# Patient Record
Sex: MALE | Race: WHITE | Employment: OTHER | URBAN - METROPOLITAN AREA
[De-identification: names, ages, dates, MRNs, and addresses within clinical notes are randomized per-mention and may not be internally consistent; named-entity substitution may affect disease eponyms.]

---

## 2019-03-22 RX ORDER — M-VIT,TX,IRON,MINS/CALC/FOLIC 27MG-0.4MG
1 TABLET ORAL DAILY
COMMUNITY

## 2019-03-22 RX ORDER — TRIAMTERENE AND HYDROCHLOROTHIAZIDE 37.5; 25 MG/1; MG/1
1 TABLET ORAL DAILY
Status: ON HOLD | COMMUNITY
End: 2021-06-15

## 2019-03-22 RX ORDER — WARFARIN SODIUM 5 MG/1
5 TABLET ORAL DAILY
COMMUNITY

## 2019-03-26 ENCOUNTER — ANESTHESIA EVENT (OUTPATIENT)
Dept: OPERATING ROOM | Age: 84
End: 2019-03-26
Payer: MEDICARE

## 2019-03-27 ENCOUNTER — ANESTHESIA (OUTPATIENT)
Dept: OPERATING ROOM | Age: 84
End: 2019-03-27
Payer: MEDICARE

## 2019-03-27 ENCOUNTER — HOSPITAL ENCOUNTER (OUTPATIENT)
Age: 84
Setting detail: OUTPATIENT SURGERY
Discharge: HOME | End: 2019-03-27
Attending: OPHTHALMOLOGY | Admitting: OPHTHALMOLOGY
Payer: MEDICARE

## 2019-03-27 VITALS
WEIGHT: 175 LBS | SYSTOLIC BLOOD PRESSURE: 119 MMHG | TEMPERATURE: 98.4 F | BODY MASS INDEX: 28.12 KG/M2 | DIASTOLIC BLOOD PRESSURE: 79 MMHG | HEART RATE: 68 BPM | HEIGHT: 66 IN | RESPIRATION RATE: 14 BRPM | OXYGEN SATURATION: 96 %

## 2019-03-27 VITALS — OXYGEN SATURATION: 100 % | DIASTOLIC BLOOD PRESSURE: 69 MMHG | SYSTOLIC BLOOD PRESSURE: 116 MMHG

## 2019-03-27 PROCEDURE — 6370000000 HC RX 637 (ALT 250 FOR IP): Performed by: OPHTHALMOLOGY

## 2019-03-27 PROCEDURE — 3600000003 HC SURGERY LEVEL 3 BASE: Performed by: OPHTHALMOLOGY

## 2019-03-27 PROCEDURE — 7100000010 HC PHASE II RECOVERY - FIRST 15 MIN: Performed by: OPHTHALMOLOGY

## 2019-03-27 PROCEDURE — 6360000002 HC RX W HCPCS: Performed by: NURSE ANESTHETIST, CERTIFIED REGISTERED

## 2019-03-27 PROCEDURE — 2580000003 HC RX 258: Performed by: ANESTHESIOLOGY

## 2019-03-27 PROCEDURE — 6360000002 HC RX W HCPCS: Performed by: OPHTHALMOLOGY

## 2019-03-27 PROCEDURE — 2709999900 HC NON-CHARGEABLE SUPPLY: Performed by: OPHTHALMOLOGY

## 2019-03-27 PROCEDURE — 3600000013 HC SURGERY LEVEL 3 ADDTL 15MIN: Performed by: OPHTHALMOLOGY

## 2019-03-27 PROCEDURE — 3700000001 HC ADD 15 MINUTES (ANESTHESIA): Performed by: OPHTHALMOLOGY

## 2019-03-27 PROCEDURE — 2500000003 HC RX 250 WO HCPCS: Performed by: OPHTHALMOLOGY

## 2019-03-27 PROCEDURE — V2632 POST CHMBR INTRAOCULAR LENS: HCPCS | Performed by: OPHTHALMOLOGY

## 2019-03-27 PROCEDURE — 3700000000 HC ANESTHESIA ATTENDED CARE: Performed by: OPHTHALMOLOGY

## 2019-03-27 PROCEDURE — 7100000011 HC PHASE II RECOVERY - ADDTL 15 MIN: Performed by: OPHTHALMOLOGY

## 2019-03-27 DEVICE — ACRYSOF(R) IQ ASPHERIC IOL SP ACRYLIC FOLDABLELENS WULTRASERT(TM) DELIVERY SYSTEM UV WBLUE LIGHT FILTER. 13.0MM LENGTH 6.0MM ANTERIORASYMMETRIC BICONVEX OPTIC PLANAR HAPTICS.
Type: IMPLANTABLE DEVICE | Site: EYE | Status: FUNCTIONAL
Brand: ACRYSOF ULTRASERT

## 2019-03-27 RX ORDER — DIPHENHYDRAMINE HYDROCHLORIDE 50 MG/ML
12.5 INJECTION INTRAMUSCULAR; INTRAVENOUS
Status: DISCONTINUED | OUTPATIENT
Start: 2019-03-27 | End: 2019-03-27 | Stop reason: HOSPADM

## 2019-03-27 RX ORDER — SODIUM CHLORIDE, POTASSIUM CHLORIDE, CALCIUM CHLORIDE, MAGNESIUM CHLORIDE, SODIUM ACETATE, AND SODIUM CITRATE 6.4; .75; .48; .3; 3.9; 1.7 MG/ML; MG/ML; MG/ML; MG/ML; MG/ML; MG/ML
SOLUTION IRRIGATION PRN
Status: DISCONTINUED | OUTPATIENT
Start: 2019-03-27 | End: 2019-03-27 | Stop reason: ALTCHOICE

## 2019-03-27 RX ORDER — LABETALOL HYDROCHLORIDE 5 MG/ML
5 INJECTION, SOLUTION INTRAVENOUS EVERY 10 MIN PRN
Status: DISCONTINUED | OUTPATIENT
Start: 2019-03-27 | End: 2019-03-27 | Stop reason: HOSPADM

## 2019-03-27 RX ORDER — MOXIFLOXACIN 5 MG/ML
1 SOLUTION/ DROPS OPHTHALMIC CONTINUOUS
Status: DISCONTINUED | OUTPATIENT
Start: 2019-03-27 | End: 2019-03-27 | Stop reason: HOSPADM

## 2019-03-27 RX ORDER — MORPHINE SULFATE 10 MG/ML
2 INJECTION, SOLUTION INTRAMUSCULAR; INTRAVENOUS EVERY 5 MIN PRN
Status: DISCONTINUED | OUTPATIENT
Start: 2019-03-27 | End: 2019-03-27 | Stop reason: HOSPADM

## 2019-03-27 RX ORDER — MORPHINE SULFATE 10 MG/ML
1 INJECTION, SOLUTION INTRAMUSCULAR; INTRAVENOUS EVERY 5 MIN PRN
Status: DISCONTINUED | OUTPATIENT
Start: 2019-03-27 | End: 2019-03-27 | Stop reason: HOSPADM

## 2019-03-27 RX ORDER — MEPERIDINE HYDROCHLORIDE 25 MG/ML
12.5 INJECTION INTRAMUSCULAR; INTRAVENOUS; SUBCUTANEOUS EVERY 5 MIN PRN
Status: DISCONTINUED | OUTPATIENT
Start: 2019-03-27 | End: 2019-03-27 | Stop reason: HOSPADM

## 2019-03-27 RX ORDER — MIDAZOLAM HYDROCHLORIDE 1 MG/ML
INJECTION INTRAMUSCULAR; INTRAVENOUS PRN
Status: DISCONTINUED | OUTPATIENT
Start: 2019-03-27 | End: 2019-03-27 | Stop reason: SDUPTHER

## 2019-03-27 RX ORDER — HYDRALAZINE HYDROCHLORIDE 20 MG/ML
5 INJECTION INTRAMUSCULAR; INTRAVENOUS EVERY 10 MIN PRN
Status: DISCONTINUED | OUTPATIENT
Start: 2019-03-27 | End: 2019-03-27 | Stop reason: HOSPADM

## 2019-03-27 RX ORDER — ACETAMINOPHEN 650 MG
TABLET, EXTENDED RELEASE ORAL PRN
Status: DISCONTINUED | OUTPATIENT
Start: 2019-03-27 | End: 2019-03-27 | Stop reason: ALTCHOICE

## 2019-03-27 RX ORDER — TETRACAINE HYDROCHLORIDE 5 MG/ML
SOLUTION OPHTHALMIC PRN
Status: DISCONTINUED | OUTPATIENT
Start: 2019-03-27 | End: 2019-03-27 | Stop reason: ALTCHOICE

## 2019-03-27 RX ORDER — PREDNISOLONE ACETATE 10 MG/ML
1 SUSPENSION/ DROPS OPHTHALMIC CONTINUOUS
Status: DISCONTINUED | OUTPATIENT
Start: 2019-03-27 | End: 2019-03-27 | Stop reason: HOSPADM

## 2019-03-27 RX ORDER — ONDANSETRON 2 MG/ML
4 INJECTION INTRAMUSCULAR; INTRAVENOUS PRN
Status: DISCONTINUED | OUTPATIENT
Start: 2019-03-27 | End: 2019-03-27 | Stop reason: HOSPADM

## 2019-03-27 RX ORDER — FENTANYL CITRATE 50 UG/ML
INJECTION, SOLUTION INTRAMUSCULAR; INTRAVENOUS PRN
Status: DISCONTINUED | OUTPATIENT
Start: 2019-03-27 | End: 2019-03-27 | Stop reason: SDUPTHER

## 2019-03-27 RX ORDER — HYDROMORPHONE HCL 110MG/55ML
0.25 PATIENT CONTROLLED ANALGESIA SYRINGE INTRAVENOUS EVERY 5 MIN PRN
Status: DISCONTINUED | OUTPATIENT
Start: 2019-03-27 | End: 2019-03-27 | Stop reason: HOSPADM

## 2019-03-27 RX ORDER — MOXIFLOXACIN 5 MG/ML
SOLUTION/ DROPS OPHTHALMIC PRN
Status: DISCONTINUED | OUTPATIENT
Start: 2019-03-27 | End: 2019-03-27 | Stop reason: ALTCHOICE

## 2019-03-27 RX ORDER — SODIUM CHLORIDE, SODIUM LACTATE, POTASSIUM CHLORIDE, CALCIUM CHLORIDE 600; 310; 30; 20 MG/100ML; MG/100ML; MG/100ML; MG/100ML
INJECTION, SOLUTION INTRAVENOUS CONTINUOUS
Status: DISCONTINUED | OUTPATIENT
Start: 2019-03-27 | End: 2019-03-27 | Stop reason: HOSPADM

## 2019-03-27 RX ORDER — PROMETHAZINE HYDROCHLORIDE 25 MG/ML
6.25 INJECTION, SOLUTION INTRAMUSCULAR; INTRAVENOUS
Status: DISCONTINUED | OUTPATIENT
Start: 2019-03-27 | End: 2019-03-27 | Stop reason: HOSPADM

## 2019-03-27 RX ORDER — OXYCODONE HYDROCHLORIDE AND ACETAMINOPHEN 5; 325 MG/1; MG/1
1 TABLET ORAL PRN
Status: DISCONTINUED | OUTPATIENT
Start: 2019-03-27 | End: 2019-03-27 | Stop reason: HOSPADM

## 2019-03-27 RX ORDER — HYDROMORPHONE HCL 110MG/55ML
0.5 PATIENT CONTROLLED ANALGESIA SYRINGE INTRAVENOUS EVERY 5 MIN PRN
Status: DISCONTINUED | OUTPATIENT
Start: 2019-03-27 | End: 2019-03-27 | Stop reason: HOSPADM

## 2019-03-27 RX ORDER — OXYCODONE HYDROCHLORIDE AND ACETAMINOPHEN 5; 325 MG/1; MG/1
2 TABLET ORAL PRN
Status: DISCONTINUED | OUTPATIENT
Start: 2019-03-27 | End: 2019-03-27 | Stop reason: HOSPADM

## 2019-03-27 RX ADMIN — Medication 0.3 ML: at 09:31

## 2019-03-27 RX ADMIN — Medication 0.3 ML: at 09:25

## 2019-03-27 RX ADMIN — FENTANYL CITRATE 50 MCG: 50 INJECTION INTRAMUSCULAR; INTRAVENOUS at 09:46

## 2019-03-27 RX ADMIN — MIDAZOLAM 1 MG: 1 INJECTION INTRAMUSCULAR; INTRAVENOUS at 09:46

## 2019-03-27 RX ADMIN — SODIUM CHLORIDE, POTASSIUM CHLORIDE, SODIUM LACTATE AND CALCIUM CHLORIDE: 600; 310; 30; 20 INJECTION, SOLUTION INTRAVENOUS at 09:25

## 2019-03-27 RX ADMIN — Medication 0.3 ML: at 09:40

## 2019-03-27 ASSESSMENT — PULMONARY FUNCTION TESTS
PIF_VALUE: 0

## 2019-03-27 ASSESSMENT — PAIN - FUNCTIONAL ASSESSMENT: PAIN_FUNCTIONAL_ASSESSMENT: 0-10

## 2019-03-27 ASSESSMENT — PAIN DESCRIPTION - DESCRIPTORS: DESCRIPTORS: ACHING

## 2019-03-27 ASSESSMENT — PAIN SCALES - GENERAL: PAINLEVEL_OUTOF10: 0

## 2019-03-30 NOTE — OP NOTE
[facility]    OPERATIVE NOTE    Preoperative Diagnosis: Cataract right eye    Postoperative Diagnosis: Cataract right eye    Procedure: Phacoemulsification with intraocular lens inplantation, right eye    Surgeon: Abdoul Guillory M.D., Ph.D. Anesthesia: MAC with topical    Complications: none    Specimens: none    Indications for procedure: The patient is a 80y.o. year old with decreased vision, glare and halos around lights, and trouble with activities of daily living. Examination revealed a visually significant cataract in the right eye. Risks, benefits, and alternatives to surgery were discussed with the patient and the patient elected to proceed with phacoemulsification with lens implantation. Details of the procedure: Following informed consent, the patient was taken to the operating room and placed in the supine position. The eye was prepped and draped in the usual sterile fashion using aseptic technique for cataract surgery. Following topical tetracaine drops a side port incision was made in the superotemporal cornea. The eye was filled with Trypan blue followed by balanced salt solution. The eye was filled with 1% non-preserved lidocaine. The eye was filled with viscoelastic and a 2.2 mm keratome blade was used to make a 3-plane clear corneal incision in the temporal cornea. The cystitome was used to make a tear in the anterior capsule and a Utrata forceps was used to make a complete curvilinear capsulorrhexis. The lens was hydrodissected and freely rotated. Phacoemulsification was performed. Irrigation/aspiration was used to remove all cortical material from the capsular bag. The eye was filled with viscoelastic and a foldable posterior chamber intraocular lens was injected into the capsular bag. Irrigation/aspiration was used to remove all excess viscoelastic. The eye was pressurized and the wounds were check for leaks and none were found.   The patient had Betadine and Alphagan solutions placed on the eye. The eye was covered with a metal shield. The patient went to the PACU in excellent condition, having tolerated the procedure extremely well, and will follow up with me tomorrow for postop day 1 care.     Marjory Nissen, MD, PhD, FACS

## 2019-03-30 NOTE — H&P
The H&P was reviewed, the patient was examined, and no change has occurred in the patient's condition since the H&P was completed.     Tameka Barrientos MD, PhD, FACS

## 2019-04-01 NOTE — PROGRESS NOTES
PRE OP INSTRUCTION SHEET   1. Do not eat or drink anything after 12 midnight  prior to surgery. This includes no water, chewing gum or mints. 2. Take the following pills will a small sip of water (see MAR)                                        3. Aspirin, Ibuprofen, Advil, Naproxen, Vitamin E, fish oil and other Anti-inflammatory products should be stopped for 5 days before surgery or as directed by your physician. 4. Check with your Doctor regarding stopping Plavix, Coumadin, Lovenox, Fragmin or other blood thinners   5. Do not smoke, and do not drink any alcoholic beverages 24 hours prior to surgery. This includes NA Beer. 6. You may brush your teeth and gargle the morning of surgery. DO NOT SWALLOW WATER   7. You MUST make arrangements for a responsible adult to take you home after your surgery. You will not be allowed to leave alone or drive yourself home. It is strongly suggested someone stay with you the first 24 hrs. Your surgery will be cancelled if you do not have a ride home. 8. A parent/legal guardian must accompany a child scheduled for surgery and plan to stay at the hospital until the child is discharged. Please do not bring other children with you. 9. Please wear simple, loose fitting clothing to the hospital.  Verdia Blanks not bring valuables (money, credit cards, checkbooks, etc.) Do not wear any makeup (including no eye makeup) or nail polish on your fingers or toes. 10. DO NOT wear any jewelry or piercings on day of surgery. All body piercing jewelry must be removed. 11. If you have dentures,glasses, or contacts they will be removed before going to the OR; we will provide you a container. 12. Please see your family doctor/and cardiologist for a history & physical and/or concerning medications. Bring any test results/reports from your physician's office. Have history and labs faxed to [phone].  Remember to bring Blood Bank bracelet on the day of surgery. 14. If you have a Living Will and Durable Power of  for Healthcare, please bring in a copy. 13. Notify your Surgeon if you develop any illness between now and surgery  time, cough, cold, fever, sore throat, nausea, vomiting, etc.  Please notify your surgeon if you experience dizziness, shortness of breath or blurred vision between now & the time of your surgery   16. DO NOT shave your operative site 96 hours prior to surgery. For face & neck surgery, men may use an electric razor 48 hours prior to surgery. 17. Shower with _x__Antibacterial soap (x_chlorhexidine for total joint  Pt's) shower two times before surgery.(the morning of and the night before. 18. To provide excellent care visitors will be limited to one in the room at any given time.   Please call pre admission testing if you any further questions [phone] or [phone]

## 2019-04-02 ENCOUNTER — ANESTHESIA EVENT (OUTPATIENT)
Dept: OPERATING ROOM | Age: 84
End: 2019-04-02
Payer: MEDICARE

## 2019-04-03 ENCOUNTER — ANESTHESIA (OUTPATIENT)
Dept: OPERATING ROOM | Age: 84
End: 2019-04-03
Payer: MEDICARE

## 2019-04-03 ENCOUNTER — HOSPITAL ENCOUNTER (OUTPATIENT)
Age: 84
Setting detail: OUTPATIENT SURGERY
Discharge: HOME | End: 2019-04-03
Attending: OPHTHALMOLOGY | Admitting: OPHTHALMOLOGY
Payer: MEDICARE

## 2019-04-03 VITALS
TEMPERATURE: 98.1 F | HEIGHT: 66 IN | RESPIRATION RATE: 14 BRPM | OXYGEN SATURATION: 99 % | SYSTOLIC BLOOD PRESSURE: 104 MMHG | HEART RATE: 70 BPM | WEIGHT: 175 LBS | DIASTOLIC BLOOD PRESSURE: 65 MMHG | BODY MASS INDEX: 28.12 KG/M2

## 2019-04-03 VITALS — OXYGEN SATURATION: 100 % | SYSTOLIC BLOOD PRESSURE: 104 MMHG | DIASTOLIC BLOOD PRESSURE: 60 MMHG

## 2019-04-03 PROCEDURE — 6360000002 HC RX W HCPCS: Performed by: OPHTHALMOLOGY

## 2019-04-03 PROCEDURE — 7100000010 HC PHASE II RECOVERY - FIRST 15 MIN: Performed by: OPHTHALMOLOGY

## 2019-04-03 PROCEDURE — 3700000001 HC ADD 15 MINUTES (ANESTHESIA): Performed by: OPHTHALMOLOGY

## 2019-04-03 PROCEDURE — 2500000003 HC RX 250 WO HCPCS: Performed by: OPHTHALMOLOGY

## 2019-04-03 PROCEDURE — 6360000002 HC RX W HCPCS: Performed by: NURSE ANESTHETIST, CERTIFIED REGISTERED

## 2019-04-03 PROCEDURE — 3600000003 HC SURGERY LEVEL 3 BASE: Performed by: OPHTHALMOLOGY

## 2019-04-03 PROCEDURE — 6370000000 HC RX 637 (ALT 250 FOR IP): Performed by: OPHTHALMOLOGY

## 2019-04-03 PROCEDURE — V2632 POST CHMBR INTRAOCULAR LENS: HCPCS | Performed by: OPHTHALMOLOGY

## 2019-04-03 PROCEDURE — 2580000003 HC RX 258: Performed by: ANESTHESIOLOGY

## 2019-04-03 PROCEDURE — 3700000000 HC ANESTHESIA ATTENDED CARE: Performed by: OPHTHALMOLOGY

## 2019-04-03 PROCEDURE — 3600000013 HC SURGERY LEVEL 3 ADDTL 15MIN: Performed by: OPHTHALMOLOGY

## 2019-04-03 PROCEDURE — 2709999900 HC NON-CHARGEABLE SUPPLY: Performed by: OPHTHALMOLOGY

## 2019-04-03 PROCEDURE — 2500000003 HC RX 250 WO HCPCS: Performed by: ANESTHESIOLOGY

## 2019-04-03 PROCEDURE — 7100000011 HC PHASE II RECOVERY - ADDTL 15 MIN: Performed by: OPHTHALMOLOGY

## 2019-04-03 DEVICE — ACRYSOF(R) IQ ASPHERIC IOL SP ACRYLIC FOLDABLELENS WULTRASERT(TM) DELIVERY SYSTEM UV WBLUE LIGHT FILTER. 13.0MM LENGTH 6.0MM ANTERIORASYMMETRIC BICONVEX OPTIC PLANAR HAPTICS.
Type: IMPLANTABLE DEVICE | Site: EYE | Status: FUNCTIONAL
Brand: ACRYSOF ULTRASERT

## 2019-04-03 RX ORDER — LIDOCAINE HYDROCHLORIDE 10 MG/ML
1 INJECTION, SOLUTION EPIDURAL; INFILTRATION; INTRACAUDAL; PERINEURAL
Status: COMPLETED | OUTPATIENT
Start: 2019-04-03 | End: 2019-04-03

## 2019-04-03 RX ORDER — SODIUM CHLORIDE 0.9 % (FLUSH) 0.9 %
10 SYRINGE (ML) INJECTION PRN
Status: DISCONTINUED | OUTPATIENT
Start: 2019-04-03 | End: 2019-04-03 | Stop reason: HOSPADM

## 2019-04-03 RX ORDER — SODIUM CHLORIDE, SODIUM LACTATE, POTASSIUM CHLORIDE, CALCIUM CHLORIDE 600; 310; 30; 20 MG/100ML; MG/100ML; MG/100ML; MG/100ML
INJECTION, SOLUTION INTRAVENOUS CONTINUOUS
Status: DISCONTINUED | OUTPATIENT
Start: 2019-04-03 | End: 2019-04-03 | Stop reason: HOSPADM

## 2019-04-03 RX ORDER — MOXIFLOXACIN 5 MG/ML
SOLUTION/ DROPS OPHTHALMIC PRN
Status: DISCONTINUED | OUTPATIENT
Start: 2019-04-03 | End: 2019-04-03 | Stop reason: ALTCHOICE

## 2019-04-03 RX ORDER — MOXIFLOXACIN 5 MG/ML
1 SOLUTION/ DROPS OPHTHALMIC SEE ADMIN INSTRUCTIONS
Status: DISCONTINUED | OUTPATIENT
Start: 2019-04-03 | End: 2019-04-03 | Stop reason: HOSPADM

## 2019-04-03 RX ORDER — ACETAMINOPHEN 650 MG
TABLET, EXTENDED RELEASE ORAL PRN
Status: DISCONTINUED | OUTPATIENT
Start: 2019-04-03 | End: 2019-04-03 | Stop reason: ALTCHOICE

## 2019-04-03 RX ORDER — FENTANYL CITRATE 50 UG/ML
INJECTION, SOLUTION INTRAMUSCULAR; INTRAVENOUS PRN
Status: DISCONTINUED | OUTPATIENT
Start: 2019-04-03 | End: 2019-04-03 | Stop reason: SDUPTHER

## 2019-04-03 RX ORDER — TETRACAINE HYDROCHLORIDE 5 MG/ML
SOLUTION OPHTHALMIC PRN
Status: DISCONTINUED | OUTPATIENT
Start: 2019-04-03 | End: 2019-04-03 | Stop reason: ALTCHOICE

## 2019-04-03 RX ORDER — SODIUM CHLORIDE, POTASSIUM CHLORIDE, CALCIUM CHLORIDE, MAGNESIUM CHLORIDE, SODIUM ACETATE, AND SODIUM CITRATE 6.4; .75; .48; .3; 3.9; 1.7 MG/ML; MG/ML; MG/ML; MG/ML; MG/ML; MG/ML
SOLUTION IRRIGATION PRN
Status: DISCONTINUED | OUTPATIENT
Start: 2019-04-03 | End: 2019-04-03 | Stop reason: ALTCHOICE

## 2019-04-03 RX ORDER — MIDAZOLAM HYDROCHLORIDE 1 MG/ML
INJECTION INTRAMUSCULAR; INTRAVENOUS PRN
Status: DISCONTINUED | OUTPATIENT
Start: 2019-04-03 | End: 2019-04-03 | Stop reason: SDUPTHER

## 2019-04-03 RX ORDER — PREDNISOLONE ACETATE 10 MG/ML
1 SUSPENSION/ DROPS OPHTHALMIC SEE ADMIN INSTRUCTIONS
Status: DISCONTINUED | OUTPATIENT
Start: 2019-04-03 | End: 2019-04-03 | Stop reason: HOSPADM

## 2019-04-03 RX ORDER — SODIUM CHLORIDE 0.9 % (FLUSH) 0.9 %
10 SYRINGE (ML) INJECTION EVERY 12 HOURS SCHEDULED
Status: DISCONTINUED | OUTPATIENT
Start: 2019-04-03 | End: 2019-04-03 | Stop reason: HOSPADM

## 2019-04-03 RX ADMIN — Medication 0.3 ML: at 07:27

## 2019-04-03 RX ADMIN — SODIUM CHLORIDE, POTASSIUM CHLORIDE, SODIUM LACTATE AND CALCIUM CHLORIDE: 600; 310; 30; 20 INJECTION, SOLUTION INTRAVENOUS at 07:12

## 2019-04-03 RX ADMIN — LIDOCAINE HYDROCHLORIDE 1 ML: 10 INJECTION, SOLUTION EPIDURAL; INFILTRATION; INTRACAUDAL; PERINEURAL at 07:22

## 2019-04-03 RX ADMIN — Medication 0.3 ML: at 07:20

## 2019-04-03 RX ADMIN — MIDAZOLAM 2 MG: 1 INJECTION INTRAMUSCULAR; INTRAVENOUS at 08:18

## 2019-04-03 RX ADMIN — SODIUM CHLORIDE, POTASSIUM CHLORIDE, SODIUM LACTATE AND CALCIUM CHLORIDE: 600; 310; 30; 20 INJECTION, SOLUTION INTRAVENOUS at 08:15

## 2019-04-03 RX ADMIN — Medication 0.3 ML: at 07:10

## 2019-04-03 RX ADMIN — FENTANYL CITRATE 25 MCG: 50 INJECTION INTRAMUSCULAR; INTRAVENOUS at 08:18

## 2019-04-03 ASSESSMENT — PULMONARY FUNCTION TESTS
PIF_VALUE: 0
PIF_VALUE: 1
PIF_VALUE: 0

## 2019-04-03 ASSESSMENT — PAIN DESCRIPTION - DESCRIPTORS: DESCRIPTORS: ACHING

## 2019-04-03 ASSESSMENT — PAIN SCALES - GENERAL: PAINLEVEL_OUTOF10: 0

## 2019-04-03 ASSESSMENT — PAIN - FUNCTIONAL ASSESSMENT: PAIN_FUNCTIONAL_ASSESSMENT: 0-10

## 2019-04-03 NOTE — ANESTHESIA PRE PROCEDURE
Department of Anesthesiology  Preprocedure Note       Name:  Willian Aguirre   Age:  80 y.o.  :  1931                                          MRN:  [id number]         Date:  4/3/2019      Surgeon: Soni Vann):  Orville Huynh MD    Procedure: DR JANNETH STEWART [facility] EMULSIFICATION OF CATARACT WITH INTRAOCULAR LENS IMPLANT EYE (Right Eye)    Medications prior to admission:   Prior to Admission medications    Medication Sig Start Date End Date Taking? Authorizing Provider   warfarin (COUMADIN) 5 MG tablet Take 5 mg by mouth daily   Yes Historical Provider, MD   triamterene-hydrochlorothiazide (MAXZIDE-25) 37.5-25 MG per tablet Take 1 tablet by mouth daily   Yes Historical Provider, MD   Multiple Vitamins-Minerals (THERAPEUTIC MULTIVITAMIN-MINERALS) tablet Take 1 tablet by mouth daily   Yes Historical Provider, MD   lisinopril (PRINIVIL;ZESTRIL) 40 MG tablet  14  Yes Historical Provider, MD       Current medications:    Current Facility-Administered Medications   Medication Dose Route Frequency Provider Last Rate Last Dose    moxifloxacin (VIGAMOX) 0.5 % ophthalmic solution 1 drop  1 drop Ophthalmic See Admin Instructions Orville Huynh MD        prednisoLONE acetate (PRED FORTE) 1 % ophthalmic suspension 1 drop  1 drop Ophthalmic See Admin Instructions Orville Huynh MD        epinephrine and lidocaine 2% PF in BSS injection (1 mL)   Intraocular Once Orville Huynh MD        lactated ringers infusion   Intravenous Continuous Fred Mcintosh  mL/hr at 19 4943      sodium chloride flush 0.9 % injection 10 mL  10 mL Intravenous 2 times per day Fred Mcintosh MD        sodium chloride flush 0.9 % injection 10 mL  10 mL Intravenous PRN Fred Mcintosh MD           Allergies:  No Known Allergies    Problem List:  There is no problem list on file for this patient.       Past Medical History:        Diagnosis Date    Aortic valve stenosis     Arthritis     shoulder    Hypertension     Phlebitis for: [facility], [address]    Anesthesia Evaluation    Airway: Mallampati: III  TM distance: >3 FB   Neck ROM: full  Mouth opening: > = 3 FB Dental:          Pulmonary:   (+) pneumonia:                             Cardiovascular:    (+) hypertension:,                   Neuro/Psych:               GI/Hepatic/Renal:             Endo/Other:                     Abdominal:           Vascular:                                        Anesthesia Plan      MAC     ASA 3       Induction: intravenous. Anesthetic plan and risks discussed with patient.                       Colten Ann MD   4/3/2019

## 2019-04-03 NOTE — ANESTHESIA POSTPROCEDURE EVALUATION
Department of Anesthesiology  Postprocedure Note    Patient: Bibiana Skelton  MRN: [id number]  YOB: 1931  Date of evaluation: 4/3/2019  Time:  12:22 PM     Procedure Summary     Date:  04/03/19 Room / Location:  [facility] 01 / [facility]    Anesthesia Start:  0815 Anesthesia Stop:  1741    Procedure:  PHACO EMULSIFICATION OF CATARACT WITH INTRAOCULAR LENS IMPLANT EYE (Right Eye) Diagnosis:  (CATARACT RIGHT EYE)    Surgeon:  Michael Baez MD Responsible Provider:  Luba Cain MD    Anesthesia Type:  MAC ASA Status:  3          Anesthesia Type: MAC    Elvin Phase I: Elvin Score: 10    Elvin Phase II: Elvin Score: 10    Last vitals: Reviewed and per EMR flowsheets. Anesthesia Post Evaluation    Patient location during evaluation: PACU  Level of consciousness: awake and alert  Airway patency: patent  Nausea & Vomiting: no nausea and no vomiting  Complications: no  Cardiovascular status: blood pressure returned to baseline  Respiratory status: acceptable  Hydration status: euvolemic  Comments: Postoperative Anesthesia Note    Name:    Bibiana Skelton  MRN:      [id number]    Patient Vitals in the past 12 hrs:  04/03/19 0847, BP:104/65, Pulse:70, Resp:14, SpO2:99 %  04/03/19 0832, BP:(!) 104/56, Temp:98.1 °F (36.7 °C), Temp src:Temporal, Pulse:74, Resp:12, SpO2:100 %  04/03/19 0719, BP:123/76, Temp:98.4 °F (36.9 °C), Temp src:Temporal, Pulse:67, Resp:14, SpO2:99 %, Height:5' 6\" (1.676 m), Weight:175 lb (79.4 kg)     LABS:    CBC  No results found for: WBC, HGB, HCT, PLT  RENAL  No results found for: NA, K, CL, CO2, BUN, CREATININE, GLUCOSE  COAGS  No results found for: PROTIME, INR, APTT    Intake & Output:  @90GWND@    Nausea & Vomiting:  No    Level of Consciousness:  Awake    Pain Assessment:  Adequate analgesia    Anesthesia Complications:  No apparent anesthetic complications    SUMMARY      Vital signs stable  OK to discharge from Stage I post anesthesia care.   Care transferred from Anesthesiology department on discharge from perioperative area

## 2019-04-03 NOTE — PROGRESS NOTES
NO CHANGE IN PHYSICAL ASSESSMENT; PT AND FAMILY VERBALIZE UNDERSTANDING OF INSTRUCTIONS; PT DISCHARGED VIA W/C BY TRANSPORT WITH FAMILY IN STABLE CONDITION; NO C/O

## 2019-04-03 NOTE — PROGRESS NOTES
Report from [facility] and CRNA. Pt arrived to postop from OR. See doc flow for vitals and assessment. Will monitor.

## 2020-11-27 ENCOUNTER — HOSPITAL ENCOUNTER (OUTPATIENT)
Age: 85
Setting detail: SPECIMEN
Discharge: HOME | End: 2020-11-27
Payer: MEDICARE

## 2020-11-27 LAB
INR BLD: 5.75 (ref 0.86–1.14)
PROTHROMBIN TIME: 63.4 SEC (ref 10–13.2)

## 2020-11-27 PROCEDURE — 85610 PROTHROMBIN TIME: CPT

## 2020-11-27 PROCEDURE — 36415 COLL VENOUS BLD VENIPUNCTURE: CPT

## 2020-11-30 LAB
INR BLD: 3.44 (ref 0.86–1.14)
PROTHROMBIN TIME: 40.4 SEC (ref 10–13.2)

## 2020-12-08 LAB
INR BLD: 2.96 (ref 0.86–1.14)
PROTHROMBIN TIME: 34.7 SEC (ref 10–13.2)

## 2021-06-15 ENCOUNTER — APPOINTMENT (OUTPATIENT)
Dept: CT IMAGING | Age: 86
DRG: 554 | End: 2021-06-15
Payer: MEDICARE

## 2021-06-15 ENCOUNTER — HOSPITAL ENCOUNTER (INPATIENT)
Age: 86
LOS: 3 days | Discharge: NURSING, REHAB OR LONG-TERM CARE | DRG: 554 | End: 2021-06-18
Attending: EMERGENCY MEDICINE | Admitting: INTERNAL MEDICINE
Payer: MEDICARE

## 2021-06-15 ENCOUNTER — APPOINTMENT (OUTPATIENT)
Dept: GENERAL RADIOLOGY | Age: 86
DRG: 554 | End: 2021-06-15
Payer: MEDICARE

## 2021-06-15 DIAGNOSIS — M25.422 ELBOW EFFUSION, LEFT: Primary | ICD-10-CM

## 2021-06-15 DIAGNOSIS — R26.2 UNABLE TO AMBULATE: ICD-10-CM

## 2021-06-15 LAB
ANION GAP SERPL CALCULATED.3IONS-SCNC: 9 MMOL/L (ref 3–16)
BASOPHILS ABSOLUTE: 0 K/UL (ref 0–0.2)
BASOPHILS RELATIVE PERCENT: 0.4 %
BUN BLDV-MCNC: 35 MG/DL (ref 7–20)
CALCIUM SERPL-MCNC: 8.8 MG/DL (ref 8.3–10.6)
CHLORIDE BLD-SCNC: 101 MMOL/L (ref 99–110)
CO2: 26 MMOL/L (ref 21–32)
CREAT SERPL-MCNC: 1 MG/DL (ref 0.8–1.3)
EOSINOPHILS ABSOLUTE: 0 K/UL (ref 0–0.6)
EOSINOPHILS RELATIVE PERCENT: 0 %
GFR AFRICAN AMERICAN: >60
GFR NON-AFRICAN AMERICAN: >60
GLUCOSE BLD-MCNC: 103 MG/DL (ref 70–99)
HCT VFR BLD CALC: 27.8 % (ref 40.5–52.5)
HEMOGLOBIN: 9.5 G/DL (ref 13.5–17.5)
INR BLD: 3.9 (ref 0.86–1.14)
LYMPHOCYTES ABSOLUTE: 1.2 K/UL (ref 1–5.1)
LYMPHOCYTES RELATIVE PERCENT: 13.5 %
MCH RBC QN AUTO: 32.4 PG (ref 26–34)
MCHC RBC AUTO-ENTMCNC: 34.1 G/DL (ref 31–36)
MCV RBC AUTO: 95 FL (ref 80–100)
MONOCYTES ABSOLUTE: 1.1 K/UL (ref 0–1.3)
MONOCYTES RELATIVE PERCENT: 12.4 %
NEUTROPHILS ABSOLUTE: 6.7 K/UL (ref 1.7–7.7)
NEUTROPHILS RELATIVE PERCENT: 73.7 %
PDW BLD-RTO: 17 % (ref 12.4–15.4)
PLATELET # BLD: 56 K/UL (ref 135–450)
PLATELET SLIDE REVIEW: ABNORMAL
PMV BLD AUTO: 12.5 FL (ref 5–10.5)
POTASSIUM REFLEX MAGNESIUM: 4.2 MMOL/L (ref 3.5–5.1)
PROTHROMBIN TIME: 45.9 SEC (ref 10–13.2)
RBC # BLD: 2.92 M/UL (ref 4.2–5.9)
SARS-COV-2, NAAT: NOT DETECTED
SEDIMENTATION RATE, ERYTHROCYTE: 21 MM/HR (ref 0–20)
SLIDE REVIEW: ABNORMAL
SODIUM BLD-SCNC: 136 MMOL/L (ref 136–145)
URIC ACID, SERUM: 4.3 MG/DL (ref 3.5–7.2)
WBC # BLD: 9.1 K/UL (ref 4–11)

## 2021-06-15 PROCEDURE — 99285 EMERGENCY DEPT VISIT HI MDM: CPT

## 2021-06-15 PROCEDURE — G0378 HOSPITAL OBSERVATION PER HR: HCPCS

## 2021-06-15 PROCEDURE — 6370000000 HC RX 637 (ALT 250 FOR IP): Performed by: EMERGENCY MEDICINE

## 2021-06-15 PROCEDURE — 85652 RBC SED RATE AUTOMATED: CPT

## 2021-06-15 PROCEDURE — 1200000000 HC SEMI PRIVATE

## 2021-06-15 PROCEDURE — 99222 1ST HOSP IP/OBS MODERATE 55: CPT | Performed by: PHYSICIAN ASSISTANT

## 2021-06-15 PROCEDURE — 85610 PROTHROMBIN TIME: CPT

## 2021-06-15 PROCEDURE — 2580000003 HC RX 258: Performed by: PHYSICIAN ASSISTANT

## 2021-06-15 PROCEDURE — 73070 X-RAY EXAM OF ELBOW: CPT

## 2021-06-15 PROCEDURE — 80048 BASIC METABOLIC PNL TOTAL CA: CPT

## 2021-06-15 PROCEDURE — 2580000003 HC RX 258: Performed by: NURSE PRACTITIONER

## 2021-06-15 PROCEDURE — 6360000002 HC RX W HCPCS: Performed by: PHYSICIAN ASSISTANT

## 2021-06-15 PROCEDURE — 84550 ASSAY OF BLOOD/URIC ACID: CPT

## 2021-06-15 PROCEDURE — 73200 CT UPPER EXTREMITY W/O DYE: CPT

## 2021-06-15 PROCEDURE — 85025 COMPLETE CBC W/AUTO DIFF WBC: CPT

## 2021-06-15 PROCEDURE — 6370000000 HC RX 637 (ALT 250 FOR IP): Performed by: PHYSICIAN ASSISTANT

## 2021-06-15 PROCEDURE — 87635 SARS-COV-2 COVID-19 AMP PRB: CPT

## 2021-06-15 RX ORDER — ATORVASTATIN CALCIUM 10 MG/1
20 TABLET, FILM COATED ORAL DAILY
COMMUNITY

## 2021-06-15 RX ORDER — TRIAMTERENE AND HYDROCHLOROTHIAZIDE 37.5; 25 MG/1; MG/1
1 TABLET ORAL DAILY
Status: DISCONTINUED | OUTPATIENT
Start: 2021-06-15 | End: 2021-06-18 | Stop reason: HOSPADM

## 2021-06-15 RX ORDER — ONDANSETRON 4 MG/1
4 TABLET, ORALLY DISINTEGRATING ORAL EVERY 8 HOURS PRN
Status: DISCONTINUED | OUTPATIENT
Start: 2021-06-15 | End: 2021-06-18 | Stop reason: HOSPADM

## 2021-06-15 RX ORDER — HYDROCODONE BITARTRATE AND ACETAMINOPHEN 5; 325 MG/1; MG/1
1 TABLET ORAL ONCE
Status: COMPLETED | OUTPATIENT
Start: 2021-06-15 | End: 2021-06-15

## 2021-06-15 RX ORDER — ATORVASTATIN CALCIUM 40 MG/1
40 TABLET, FILM COATED ORAL DAILY
Status: DISCONTINUED | OUTPATIENT
Start: 2021-06-16 | End: 2021-06-18 | Stop reason: HOSPADM

## 2021-06-15 RX ORDER — SODIUM CHLORIDE 0.9 % (FLUSH) 0.9 %
5-40 SYRINGE (ML) INJECTION PRN
Status: DISCONTINUED | OUTPATIENT
Start: 2021-06-15 | End: 2021-06-18 | Stop reason: HOSPADM

## 2021-06-15 RX ORDER — PHYTONADIONE 10 MG/ML
10 INJECTION, EMULSION INTRAMUSCULAR; INTRAVENOUS; SUBCUTANEOUS ONCE
Status: COMPLETED | OUTPATIENT
Start: 2021-06-15 | End: 2021-06-15

## 2021-06-15 RX ORDER — TAMSULOSIN HYDROCHLORIDE 0.4 MG/1
0.4 CAPSULE ORAL DAILY
COMMUNITY
End: 2022-11-02

## 2021-06-15 RX ORDER — LISINOPRIL 20 MG/1
40 TABLET ORAL DAILY
Status: DISCONTINUED | OUTPATIENT
Start: 2021-06-15 | End: 2021-06-18 | Stop reason: HOSPADM

## 2021-06-15 RX ORDER — ACETAMINOPHEN 650 MG/1
650 SUPPOSITORY RECTAL EVERY 6 HOURS PRN
Status: DISCONTINUED | OUTPATIENT
Start: 2021-06-15 | End: 2021-06-18 | Stop reason: HOSPADM

## 2021-06-15 RX ORDER — ONDANSETRON 2 MG/ML
4 INJECTION INTRAMUSCULAR; INTRAVENOUS EVERY 6 HOURS PRN
Status: DISCONTINUED | OUTPATIENT
Start: 2021-06-15 | End: 2021-06-18 | Stop reason: HOSPADM

## 2021-06-15 RX ORDER — ATORVASTATIN CALCIUM 10 MG/1
10 TABLET, FILM COATED ORAL DAILY
Status: DISCONTINUED | OUTPATIENT
Start: 2021-06-15 | End: 2021-06-15

## 2021-06-15 RX ORDER — TAMSULOSIN HYDROCHLORIDE 0.4 MG/1
0.4 CAPSULE ORAL DAILY
Status: DISCONTINUED | OUTPATIENT
Start: 2021-06-15 | End: 2021-06-18 | Stop reason: HOSPADM

## 2021-06-15 RX ORDER — SODIUM CHLORIDE 0.9 % (FLUSH) 0.9 %
5-40 SYRINGE (ML) INJECTION EVERY 12 HOURS SCHEDULED
Status: DISCONTINUED | OUTPATIENT
Start: 2021-06-15 | End: 2021-06-18 | Stop reason: HOSPADM

## 2021-06-15 RX ORDER — TRAMADOL HYDROCHLORIDE 50 MG/1
50 TABLET ORAL EVERY 6 HOURS PRN
Status: DISCONTINUED | OUTPATIENT
Start: 2021-06-15 | End: 2021-06-18 | Stop reason: HOSPADM

## 2021-06-15 RX ORDER — ACETAMINOPHEN 325 MG/1
650 TABLET ORAL EVERY 6 HOURS PRN
Status: DISCONTINUED | OUTPATIENT
Start: 2021-06-15 | End: 2021-06-18 | Stop reason: HOSPADM

## 2021-06-15 RX ORDER — SODIUM CHLORIDE 9 MG/ML
25 INJECTION, SOLUTION INTRAVENOUS PRN
Status: DISCONTINUED | OUTPATIENT
Start: 2021-06-15 | End: 2021-06-18 | Stop reason: HOSPADM

## 2021-06-15 RX ORDER — TRAMADOL HYDROCHLORIDE 50 MG/1
50 TABLET ORAL EVERY 6 HOURS PRN
Status: ON HOLD | COMMUNITY
End: 2021-06-18 | Stop reason: SDUPTHER

## 2021-06-15 RX ORDER — FINASTERIDE 5 MG/1
5 TABLET, FILM COATED ORAL DAILY
COMMUNITY

## 2021-06-15 RX ORDER — FINASTERIDE 5 MG/1
5 TABLET, FILM COATED ORAL DAILY
Status: DISCONTINUED | OUTPATIENT
Start: 2021-06-15 | End: 2021-06-18 | Stop reason: HOSPADM

## 2021-06-15 RX ORDER — POLYETHYLENE GLYCOL 3350 17 G/17G
17 POWDER, FOR SOLUTION ORAL DAILY PRN
Status: DISCONTINUED | OUTPATIENT
Start: 2021-06-15 | End: 2021-06-18 | Stop reason: HOSPADM

## 2021-06-15 RX ORDER — M-VIT,TX,IRON,MINS/CALC/FOLIC 27MG-0.4MG
1 TABLET ORAL DAILY
Status: DISCONTINUED | OUTPATIENT
Start: 2021-06-15 | End: 2021-06-18 | Stop reason: HOSPADM

## 2021-06-15 RX ADMIN — PHYTONADIONE 10 MG: 10 INJECTION, EMULSION INTRAMUSCULAR; INTRAVENOUS; SUBCUTANEOUS at 18:03

## 2021-06-15 RX ADMIN — Medication 10 ML: at 21:27

## 2021-06-15 RX ADMIN — AMPICILLIN AND SULBACTAM 1500 MG: 1; .5 INJECTION, POWDER, FOR SOLUTION INTRAMUSCULAR; INTRAVENOUS at 18:02

## 2021-06-15 RX ADMIN — HYDROCODONE BITARTRATE AND ACETAMINOPHEN 1 TABLET: 5; 325 TABLET ORAL at 08:05

## 2021-06-15 RX ADMIN — SODIUM CHLORIDE 25 ML: 9 INJECTION, SOLUTION INTRAVENOUS at 17:59

## 2021-06-15 RX ADMIN — TRAMADOL HYDROCHLORIDE 50 MG: 50 TABLET ORAL at 17:07

## 2021-06-15 RX ADMIN — VANCOMYCIN HYDROCHLORIDE 500 MG: 500 INJECTION, POWDER, LYOPHILIZED, FOR SOLUTION INTRAVENOUS at 18:54

## 2021-06-15 ASSESSMENT — PAIN DESCRIPTION - LOCATION: LOCATION: ARM

## 2021-06-15 ASSESSMENT — PAIN SCALES - GENERAL
PAINLEVEL_OUTOF10: 10
PAINLEVEL_OUTOF10: 10
PAINLEVEL_OUTOF10: 6
PAINLEVEL_OUTOF10: 10

## 2021-06-15 ASSESSMENT — PAIN DESCRIPTION - PAIN TYPE: TYPE: ACUTE PAIN

## 2021-06-15 ASSESSMENT — PAIN DESCRIPTION - ORIENTATION: ORIENTATION: LEFT

## 2021-06-15 NOTE — PROGRESS NOTES
Consult has been called to Lyndsey riggins  on 6/15/21.  Gladys riggins. 3:07 PM    Torres Malloy  6/15/2021

## 2021-06-15 NOTE — ED NOTES
Perfect serve message to [facility]  06/15/21 6025 [facility] returned the call to Dr. Mark Izaguirre  06/15/21 [phone]

## 2021-06-15 NOTE — ED NOTES
Report given to Rey Oswald RN patient transport put in     Karuna Ridges, [place]  06/15/21 9649

## 2021-06-15 NOTE — H&P
swelling negative for chest pain   Gastrointestinal: Negative for vomiting, diarrhea   Genitourinary: Negative for hematuria   Musculoskeletal: + Arthralgias  Skin + chronic skin changes  Neurological: Negative for syncope       PHYSICAL EXAM:    BP (!) 153/91   Pulse 112   Temp 97.7 °F (36.5 °C) (Oral)   Resp 13   Ht 5' 9\" (1.753 m)   Wt 175 lb (79.4 kg)   SpO2 96%   BMI 25.84 kg/m²   Gen: Elderly male, no distress. Alert. Eyes:No conjunctival injection. ENT: No discharge. Pharynx clear. Neck:  Trachea midline. Resp: No accessory muscle use. No crackles. No wheezes. No rhonchi. CV: Regular rate. Irregular rhythm. + murmur. Bilateral lower extremity edema. Capillary Refill: Brisk,< 3 seconds   Peripheral Pulses: +2 palpable, equal bilaterally   GI: Non-tender. Non-distended. Normal bowel sounds. Lesly-umbilical hernia, soft, easily reducible   Skin: Warm and dry. Bilateral lower extremities swelling and scaling with chronic skin changes   M/S: + Significant edema to the left upper extremity with large joint effusion to the left elbow with severe pain with either passive or active movement and tenderness at the joint line only, no bony tenderness to palpation, good range of motion of the left wrist and digits, all compartments are soft  Neuro: Awake. Grossly nonfocal    Psych: Oriented x 3. No anxiety or agitation. CBC:   Recent Labs     06/15/21  0730   WBC 9.1   HGB 9.5*   HCT 27.8*   MCV 95.0   PLT 56*     BMP:   Recent Labs     06/15/21  0730      K 4.2      CO2 26   BUN 35*   CREATININE 1.0     PT/INR:   Recent Labs     06/15/21  0730   PROTIME 45.9*   INR 3.90*     CULTURES  COVID-19- not detected    EKG:    No EKG from this admission for review    RADIOLOGY  CT ELBOW LEFT WO CONTRAST   Final Result   Within the limitations of the exam, no acute bony abnormality is identified. There is a large elbow joint effusion which is nonspecific.   Sterility cannot   be assessed on imaging and if there is concern for septic arthritis   aspiration is recommended. Superimposed mild degenerative changes to the   elbow joint along with some calcifications either within the joint or in the   joint capsule posteriorly. Nonspecific subcutaneous edema about the elbow. No focal fluid collection or   soft tissue gas identified. XR ELBOW LEFT (2 VIEWS)   Final Result   No acute abnormality the left elbow.            Pertinent previous results reviewed   None     ASSESSMENT/PLAN:  Left elbow effusion and pain  -Minimally elevated sed rate  -Had remote injury back in January but otherwise no acute injury  -ED had low concerns for septic joint at the time of their evaluation however pain and symptoms have evolved and patient is asplenic and does not have an elevated leukocytosis or fevers but may not mount a typical response  -Overlying skin changes concerning for cellulitis  -Significant pain to the joint line specifically as well as severe pain with any minimal active or passive range of motion which is very concerning for possible septic arthritis  -Patient does have elevated INR and low platelet counts, give vitamin K x1 to reverse INR, may need platelet transfusion if requiring OR  -ortho consult  -PT/OT: Need placement as patient does require a walker to safely get around his home  - Start IV Vanc/Unasyn     Supra-therapeutic INR  -elevated  INR 3.90  - No acute bleeding concerns  -Vitamin K 10 subcu x1 with concerns for possible need for procedure  -Would monitor closely, may need to start heparin in the morning    Atrial fibrillation  Aortic valve stenosis  Status post TAVR  - AC on Coumadin  -Supratherapeutic INR, see above--> worsening INR, may require heparin drip  -s/p pacemaker    Thrombocytopenia  -Has had similar issues in the past  -Was discontinued off his aspirin  -No acute bleeding  -Monitor platelets closely, may require platelet transfusion if needs to go to the OR for the left elbow     HTN  -Continue home lisinipril  -controlled     Hx of Hairy Cell Leukemia   - Follows with oncology    S/p splenectomy  - in 1978, reports he is unsure why this occurred     DVT Prophylaxis: AC on Coumadin   Diet: Regular  Code Status: DNR-CCA      Gerri Nguyen PA-C  6/15/2021 1:11 PM

## 2021-06-15 NOTE — PROGRESS NOTES
Admission questions completed per float RN. Medications completed per writer see MAR. Handoff of admission completion to night shift. See Media tab for skin issue reported per pt. Report given @ bedside to General acute hospital, for transferral of care. Pt resting in bed. Call light in reach.

## 2021-06-15 NOTE — CONSULTS
Pharmacy to dose IV Vanco:  Please give 500mg IV Vanco Q12hrs to provide Gram+ organism coverage to include MRSA for bone/ joint infection. Trough 6/17 at 0530.   IVELISSE [facility] PharmD 6/15/2021 5:42 PM

## 2021-06-15 NOTE — ED PROVIDER NOTES
(THERAPEUTIC MULTIVITAMIN-MINERALS) tablet Take 1 tablet by mouth daily      lisinopril (PRINIVIL;ZESTRIL) 40 MG tablet       triamterene-hydrochlorothiazide (MAXZIDE-25) 37.5-25 MG per tablet Take 1 tablet by mouth daily       No Known Allergies    REVIEW OF SYSTEMS  10 systems reviewed, pertinent positives per HPI otherwise noted to be negative. PHYSICAL EXAM  BP (!) 142/99   Pulse 114   Temp 97.7 °F (36.5 °C) (Oral)   Resp 18   Ht 5' 9\" (1.753 m)   Wt 175 lb (79.4 kg)   SpO2 95%   BMI 25.84 kg/m²   GENERAL APPEARANCE: Awake and alert. Cooperative. No acute distress. HEAD: Normocephalic. Atraumatic. EYES: PERRL. EOM's grossly intact. ENT: Mucous membranes are moist.   NECK: Supple, trachea midline. HEART: RRR. Normal S1, S2. 3 out of 6 systolic murmur  LUNGS: Respirations unlabored. CTAB. Good air exchange. No wheezes, rales, or rhonchi. Speaking comfortably in full sentences. ABDOMEN: Soft. Non-distended. Non-tender. No guarding or rebound. Normal Bowel sounds. EXTREMITIES: No peripheral edema. No acute deformities. Mild diffuse swelling of the left upper extremity from the elbow to the fingers which patient states is chronic. Diffuse tenderness of the left elbow with painful passive range of motion. SKIN: Warm and dry. No acute rashes. NEUROLOGICAL: Alert and oriented X 3. CN II-XII intact. No gross facial drooping. Strength 5/5, sensation intact. No pronator drift. Normal coordination. Melita Díaz PSYCHIATRIC: Normal mood and affect. LABS  I have reviewed all labs for this visit.    Results for orders placed or performed during the hospital encounter of 06/15/21   CBC Auto Differential   Result Value Ref Range    WBC 9.1 4.0 - 11.0 K/uL    RBC 2.92 (L) 4.20 - 5.90 M/uL    Hemoglobin 9.5 (L) 13.5 - 17.5 g/dL    Hematocrit 27.8 (L) 40.5 - 52.5 %    MCV 95.0 80.0 - 100.0 fL    MCH 32.4 26.0 - 34.0 pg    MCHC 34.1 31.0 - 36.0 g/dL    RDW 17.0 (H) 12.4 - 15.4 %    Platelets 56 (L) 428 - 450 K/uL MPV 12.5 (H) 5.0 - 10.5 fL    PLATELET SLIDE REVIEW Decreased     SLIDE REVIEW see below     Neutrophils % 73.7 %    Lymphocytes % 13.5 %    Monocytes % 12.4 %    Eosinophils % 0.0 %    Basophils % 0.4 %    Neutrophils Absolute 6.7 1.7 - 7.7 K/uL    Lymphocytes Absolute 1.2 1.0 - 5.1 K/uL    Monocytes Absolute 1.1 0.0 - 1.3 K/uL    Eosinophils Absolute 0.0 0.0 - 0.6 K/uL    Basophils Absolute 0.0 0.0 - 0.2 K/uL   Basic Metabolic Panel w/ Reflex to MG   Result Value Ref Range    Sodium 136 136 - 145 mmol/L    Potassium reflex Magnesium 4.2 3.5 - 5.1 mmol/L    Chloride 101 99 - 110 mmol/L    CO2 26 21 - 32 mmol/L    Anion Gap 9 3 - 16    Glucose 103 (H) 70 - 99 mg/dL    BUN 35 (H) 7 - 20 mg/dL    CREATININE 1.0 0.8 - 1.3 mg/dL    GFR Non-African American >60 >60    GFR African American >60 >60    Calcium 8.8 8.3 - 10.6 mg/dL   Protime-INR   Result Value Ref Range    Protime 45.9 (H) 10.0 - 13.2 sec    INR 3.90 (H) 0.86 - 1.14   Sedimentation Rate   Result Value Ref Range    Sed Rate 21 (H) 0 - 20 mm/Hr           RADIOLOGY  X-RAYS:  I have reviewed radiologic plain film image(s). ALL OTHER NON-PLAIN FILM IMAGES SUCH AS CT, ULTRASOUND AND MRI HAVE BEEN READ BY THE RADIOLOGIST. CT ELBOW LEFT WO CONTRAST   Final Result   Within the limitations of the exam, no acute bony abnormality is identified. There is a large elbow joint effusion which is nonspecific. Sterility cannot   be assessed on imaging and if there is concern for septic arthritis   aspiration is recommended. Superimposed mild degenerative changes to the   elbow joint along with some calcifications either within the joint or in the   joint capsule posteriorly. Nonspecific subcutaneous edema about the elbow. No focal fluid collection or   soft tissue gas identified. XR ELBOW LEFT (2 VIEWS)   Final Result   No acute abnormality the left elbow. Rechecks: Physical assessment performed.   Resting comfortably on reevaluation. ED COURSE/MDM  Patient seen and evaluated. Old records reviewed. Labs and imaging reviewed and results discussed with patient. Patient coming in with left elbow pain found to have a large effusion. He is afebrile with no leukocytosis or significantly elevated ESR. Low index of suspicion for joint space infection. Due to INR of 3.9 will hold off on tapping the joint at this time. Case discussed with orthopedics and they are in agreement and will consult. On reevaluation family voices concerns that patient is unable to ambulate because with his severe left elbow pain he cannot utilize his walker and has no other way of getting around the house. Will likely need PT/OT evaluation and potentially placement in short-term rehab. Case discussed with the hospitalist service for admission. New Prescriptions    No medications on file       CLINICAL IMPRESSION  1. Elbow effusion, left    2. Unable to ambulate        Blood pressure (!) 142/99, pulse 114, temperature 97.7 °F (36.5 °C), temperature source Oral, resp. rate 18, height 5' 9\" (1.753 m), weight 175 lb (79.4 kg), SpO2 95 %. Rachael Coelho Rina Otoolealicia 86 was admitted in stable condition.         Derrick Crowe MD  06/15/21 5186

## 2021-06-15 NOTE — PROGRESS NOTES
Admission questions completed at this time. Answers provided by patient and wife Eduardo Quijano at bedside. Nurse practitioner Ileana Kehr at bedside to evaluate patient. Changes in medication, lipitor, communicated to shift RN HERNANDEZ Stevenson. Cuate Guerra Clinical .

## 2021-06-16 LAB
ANION GAP SERPL CALCULATED.3IONS-SCNC: 8 MMOL/L (ref 3–16)
ANISOCYTOSIS: ABNORMAL
BASOPHILS ABSOLUTE: 0.1 K/UL (ref 0–0.2)
BASOPHILS RELATIVE PERCENT: 0.6 %
BUN BLDV-MCNC: 27 MG/DL (ref 7–20)
CALCIUM SERPL-MCNC: 8.2 MG/DL (ref 8.3–10.6)
CHLORIDE BLD-SCNC: 105 MMOL/L (ref 99–110)
CO2: 23 MMOL/L (ref 21–32)
CREAT SERPL-MCNC: 0.8 MG/DL (ref 0.8–1.3)
EOSINOPHILS ABSOLUTE: 0 K/UL (ref 0–0.6)
EOSINOPHILS RELATIVE PERCENT: 0.1 %
GFR AFRICAN AMERICAN: >60
GFR NON-AFRICAN AMERICAN: >60
GLUCOSE BLD-MCNC: 91 MG/DL (ref 70–99)
HCT VFR BLD CALC: 26.1 % (ref 40.5–52.5)
HEMATOLOGY PATH CONSULT: NORMAL
HEMATOLOGY PATH CONSULT: YES
HEMOGLOBIN: 9 G/DL (ref 13.5–17.5)
HYPOCHROMIA: ABNORMAL
INR BLD: 3.89 (ref 0.86–1.14)
LYMPHOCYTES ABSOLUTE: 1.2 K/UL (ref 1–5.1)
LYMPHOCYTES RELATIVE PERCENT: 13.4 %
MCH RBC QN AUTO: 32.2 PG (ref 26–34)
MCHC RBC AUTO-ENTMCNC: 34.3 G/DL (ref 31–36)
MCV RBC AUTO: 93.9 FL (ref 80–100)
MONOCYTES ABSOLUTE: 1.1 K/UL (ref 0–1.3)
MONOCYTES RELATIVE PERCENT: 11.6 %
NEUTROPHILS ABSOLUTE: 6.9 K/UL (ref 1.7–7.7)
NEUTROPHILS RELATIVE PERCENT: 74.3 %
PDW BLD-RTO: 16.8 % (ref 12.4–15.4)
PLATELET # BLD: 52 K/UL (ref 135–450)
PLATELET SLIDE REVIEW: ABNORMAL
PMV BLD AUTO: 12.2 FL (ref 5–10.5)
POIKILOCYTES: ABNORMAL
POTASSIUM REFLEX MAGNESIUM: 3.6 MMOL/L (ref 3.5–5.1)
PROTHROMBIN TIME: 45.8 SEC (ref 10–13.2)
RBC # BLD: 2.78 M/UL (ref 4.2–5.9)
SCHISTOCYTES: ABNORMAL
SLIDE REVIEW: ABNORMAL
SODIUM BLD-SCNC: 136 MMOL/L (ref 136–145)
WBC # BLD: 9.2 K/UL (ref 4–11)

## 2021-06-16 PROCEDURE — 97530 THERAPEUTIC ACTIVITIES: CPT

## 2021-06-16 PROCEDURE — 97110 THERAPEUTIC EXERCISES: CPT

## 2021-06-16 PROCEDURE — 6360000002 HC RX W HCPCS: Performed by: PHYSICIAN ASSISTANT

## 2021-06-16 PROCEDURE — 97116 GAIT TRAINING THERAPY: CPT

## 2021-06-16 PROCEDURE — 99232 SBSQ HOSP IP/OBS MODERATE 35: CPT | Performed by: INTERNAL MEDICINE

## 2021-06-16 PROCEDURE — 97162 PT EVAL MOD COMPLEX 30 MIN: CPT

## 2021-06-16 PROCEDURE — 80048 BASIC METABOLIC PNL TOTAL CA: CPT

## 2021-06-16 PROCEDURE — G0378 HOSPITAL OBSERVATION PER HR: HCPCS

## 2021-06-16 PROCEDURE — 2580000003 HC RX 258: Performed by: PHYSICIAN ASSISTANT

## 2021-06-16 PROCEDURE — 99221 1ST HOSP IP/OBS SF/LOW 40: CPT | Performed by: PHYSICIAN ASSISTANT

## 2021-06-16 PROCEDURE — 6370000000 HC RX 637 (ALT 250 FOR IP): Performed by: PHYSICIAN ASSISTANT

## 2021-06-16 PROCEDURE — 6370000000 HC RX 637 (ALT 250 FOR IP): Performed by: INTERNAL MEDICINE

## 2021-06-16 PROCEDURE — 97166 OT EVAL MOD COMPLEX 45 MIN: CPT

## 2021-06-16 PROCEDURE — 85025 COMPLETE CBC W/AUTO DIFF WBC: CPT

## 2021-06-16 PROCEDURE — 2580000003 HC RX 258: Performed by: NURSE PRACTITIONER

## 2021-06-16 PROCEDURE — 36415 COLL VENOUS BLD VENIPUNCTURE: CPT

## 2021-06-16 PROCEDURE — 1200000000 HC SEMI PRIVATE

## 2021-06-16 PROCEDURE — 85610 PROTHROMBIN TIME: CPT

## 2021-06-16 PROCEDURE — 6370000000 HC RX 637 (ALT 250 FOR IP): Performed by: NURSE PRACTITIONER

## 2021-06-16 PROCEDURE — 97535 SELF CARE MNGMENT TRAINING: CPT

## 2021-06-16 RX ORDER — AMOXICILLIN AND CLAVULANATE POTASSIUM 500; 125 MG/1; MG/1
1 TABLET, FILM COATED ORAL EVERY 12 HOURS SCHEDULED
Status: DISCONTINUED | OUTPATIENT
Start: 2021-06-16 | End: 2021-06-18 | Stop reason: HOSPADM

## 2021-06-16 RX ADMIN — ATORVASTATIN CALCIUM 40 MG: 40 TABLET, FILM COATED ORAL at 10:02

## 2021-06-16 RX ADMIN — TRIAMTERENE AND HYDROCHLOROTHIAZIDE 1 TABLET: 37.5; 25 TABLET ORAL at 10:02

## 2021-06-16 RX ADMIN — FINASTERIDE 5 MG: 5 TABLET, FILM COATED ORAL at 10:02

## 2021-06-16 RX ADMIN — AMOXICILLIN AND CLAVULANATE POTASSIUM 1 TABLET: 500; 125 TABLET, FILM COATED ORAL at 21:06

## 2021-06-16 RX ADMIN — VANCOMYCIN HYDROCHLORIDE 500 MG: 500 INJECTION, POWDER, LYOPHILIZED, FOR SOLUTION INTRAVENOUS at 06:19

## 2021-06-16 RX ADMIN — AMPICILLIN AND SULBACTAM 1500 MG: 1; .5 INJECTION, POWDER, FOR SOLUTION INTRAMUSCULAR; INTRAVENOUS at 05:13

## 2021-06-16 RX ADMIN — AMPICILLIN AND SULBACTAM 1500 MG: 1; .5 INJECTION, POWDER, FOR SOLUTION INTRAMUSCULAR; INTRAVENOUS at 01:17

## 2021-06-16 RX ADMIN — Medication 1 TABLET: at 10:02

## 2021-06-16 RX ADMIN — LISINOPRIL 40 MG: 20 TABLET ORAL at 10:01

## 2021-06-16 RX ADMIN — Medication 10 ML: at 10:03

## 2021-06-16 RX ADMIN — Medication 10 ML: at 21:06

## 2021-06-16 RX ADMIN — AMPICILLIN AND SULBACTAM 1500 MG: 1; .5 INJECTION, POWDER, FOR SOLUTION INTRAMUSCULAR; INTRAVENOUS at 12:28

## 2021-06-16 RX ADMIN — TAMSULOSIN HYDROCHLORIDE 0.4 MG: 0.4 CAPSULE ORAL at 10:01

## 2021-06-16 NOTE — PROGRESS NOTES
Assisted up [facility] with assist of 2 very difficult r/t swelling and arthritis in shoulders.

## 2021-06-16 NOTE — PROGRESS NOTES
Progress Note    Admit Date:  6/15/2021    Subjective:  Mr. Smitha Renee states he is feeling ok today. Continues to have swelling to his left elbow. Has some improvement in ROM at the left elbow, though not much. Objective:   Patient Vitals for the past 4 hrs:   BP Temp Temp src Pulse Resp SpO2   06/16/21 0808 (!) 141/76 97.5 °F (36.4 °C) Oral 69 16 95 %            Intake/Output Summary (Last 24 hours) at 6/16/2021 0911  Last data filed at 6/15/2021 1854  Gross per 24 hour   Intake 64.22 ml   Output --   Net 64.22 ml       Physical Exam:  Gen: Elderly male, no distress. Alert. Sitting up in his chair  Eyes: No conjunctival injection. ENT: No discharge. Pharynx clear. Neck:  Trachea midline. Resp: No accessory muscle use. No crackles. No wheezes. No rhonchi. On Ra  CV: Regular rate. Irregular rhythm. + murmur. Bilateral lower extremity edema. Capillary Refill: Brisk,< 3 seconds   Peripheral Pulses: +1 palpable, equal bilaterally   GI: Non-tender. Non-distended. Normal bowel sounds. Lesly-umbilical hernia, soft, easily reducible   Skin: Warm and dry. Bilateral lower extremities swelling and scaling with chronic skin changes   M/S: + Significant edema to the left upper extremity with large joint effusion to the left elbow with severe pain with either passive or active movement and tenderness at the joint line only, no bony tenderness to palpation, good range of motion of the left wrist and digits, all compartments are soft  Neuro: Awake. Grossly non-focal, moves all extremities, no dysarthria, follows commands    Psych: Oriented x 3. No anxiety or agitation. Jazmyne Cruz MD have reviewed the chart on Jenny Atkinson and personally interviewed and examined patient, reviewed the data (labs and imaging) and after discussion with my PA formulated the plan. Agree with note with the following edits.         Physical exam:    BP (!) 141/76   Pulse 69   Temp 97.5 °F (36.4 °C) (Oral)   Resp 16   Ht 5' 9\" (1.753 m)   Wt 175 lb (79.4 kg)   SpO2 95%   BMI 25.84 kg/m²     Gen: No distress. Alert. Eyes: PERRL. No sclera icterus. No conjunctival injection. ENT: No discharge. Pharynx clear. Neck: Trachea midline. Normal thyroid. Resp: No accessory muscle use. No crackles. No wheezes. No rhonchi. No dullness on percussion. CV: Regular rate. Regular rhythm. No murmur or rub. No edema. GI: Non-tender. Non-distended. No masses. No organomegaly. Normal bowel sounds. No hernia. Skin: Warm and dry. No nodule on exposed extremities. No rash on exposed extremities. Lymph: No cervical LAD. No supraclavicular LAD. M/S: + Significant edema to the left upper extremity with large joint effusion to the left elbow with severe pain with either passive or active movement and tenderness at the joint line only, no bony tenderness to palpation, good range of motion of the left wrist and digits, all compartments are soft  Neuro: Awake. Moves all 4 extremities, non focal  Psych: Oriented x 3. No anxiety or agitation.            IBETH Sheets      Scheduled Meds:   finasteride  5 mg Oral Daily    lisinopril  40 mg Oral Daily    therapeutic multivitamin-minerals  1 tablet Oral Daily    tamsulosin  0.4 mg Oral Daily    triamterene-hydroCHLOROthiazide  1 tablet Oral Daily    sodium chloride flush  5-40 mL Intravenous 2 times per day    atorvastatin  40 mg Oral Daily    warfarin (COUMADIN) daily dosing (placeholder)   Other RX Placeholder    ampicillin-sulbactam  1,500 mg Intravenous Q6H    vancomycin  500 mg Intravenous Q12H       Continuous Infusions:   sodium chloride Stopped (06/15/21 6785)       PRN Meds:  sodium chloride flush, sodium chloride, ondansetron **OR** ondansetron, polyethylene glycol, acetaminophen **OR** acetaminophen, traMADol      Data:  CBC:   Recent Labs     06/15/21  0730 06/16/21  0545   WBC 9.1 9.2   HGB 9.5* 9.0*   HCT 27.8* 26.1*   MCV 95.0 93.9   PLT 56* 52*     BMP: Recent Labs     06/15/21  0730 06/16/21  0545    136   K 4.2 3.6    105   CO2 26 23   BUN 35* 27*   CREATININE 1.0 0.8     PT/INR:   Recent Labs     06/15/21  0730 06/16/21  0545   PROTIME 45.9* 45.8*   INR 3.90* 3.89*       CULTURES    SARS-COV-2 - Rapid: Not detected      RADIOLOGY    CT ELBOW LEFT WO CONTRAST 6/15/2021   Final Result   Within the limitations of the exam, no acute bony abnormality is identified. There is a large elbow joint effusion which is nonspecific. Sterility cannot   be assessed on imaging and if there is concern for septic arthritis   aspiration is recommended. Superimposed mild degenerative changes to the   elbow joint along with some calcifications either within the joint or in the   joint capsule posteriorly. Nonspecific subcutaneous edema about the elbow. No focal fluid collection or   soft tissue gas identified. XR ELBOW LEFT (2 VIEWS) 6/15/2021     Final Result   No acute abnormality the left elbow. Assessment/Plan:    Left elbow effusion and pain  - Minimally elevated sed rate  - Had remote injury back in January but otherwise no acute injury  - ED had low concerns for septic joint at the time of their evaluation however pain and symptoms have evolved and patient is asplenic and does not have an elevated leukocytosis or fevers but may not mount a typical response  - Overlying skin changes concerning for cellulitis  - Significant pain to the joint line specifically as well as severe pain with any minimal active or passive range of motion which is very concerning for possible septic arthritis  - Pt does have elevated INR and low platelet counts, give vitamin K x1 to reverse INR, may need platelet transfusion if requiring OR  - ortho consult - await recommendations  - PT/OT: Need placement as pt does require a walker to safely get around his home  - IV Vanc/Unasyn D#2 dc iv abx  Likely hemarthrosis. Discussed with Ortho.   Discontinue IV antibiotics. Switch to Augmentin for possible cellulitis.       Supra-therapeutic INR  - elevated INR 3.90 > 3.89  - No acute bleeding concerns  - Vitamin K 10 subcu x1 with concerns for possible need for procedure  - Would monitor closely, may need to start heparin in the morning     Atrial fibrillation  Aortic valve stenosis  Status post TAVR  - AC on Coumadin  - Supratherapeutic INR, see above--> worsening INR, may require heparin drip  - s/p pacemaker     Thrombocytopenia  - Plt 56 > 52  - Has had similar issues in the past  - Was discontinued off his aspirin  - No acute bleeding  - Monitor platelets closely, may require platelet transfusion if needs to go to the OR for the left elbow    Normocytic Anemia  - Hgb 9.5 on arrival  - previously 8.7 on 5/3/2021  - 9 today      HTN  - uncontrolled - with SBP in 140s  - Continue home lisinipril     Hx of Hairy Cell Leukemia   - Follows with oncology     S/p splenectomy  - 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 CT showing evidence of large effusion. Ortho consulted. Ortho does not feel it is septic arthritis. Not have a fever or elevated white count or elevated sedimentation rate. Clear hemarthrosis. No intervention. The swelling of the left upper extremity is likely secondary to immobility. Advised to keep arm elevated. Augmentin for possible cellulitis. AMADO Sheets.

## 2021-06-16 NOTE — PLAN OF CARE
Problem: Falls - Risk of:  Goal: Will remain free from falls  Description: Will remain free from falls  6/15/2021 2125 by Rancho Ndiaye RN  Outcome: Ongoing     Problem: Falls - Risk of:  Goal: Will remain free from falls  Description: Will remain free from falls  6/16/2021 1040 by Bryant Diaz RN  Outcome: Ongoing     Problem: Skin Integrity:  Goal: Will show no infection signs and symptoms  Description: Will show no infection signs and symptoms  6/16/2021 1040 by Bryant Diaz RN  Outcome: Ongoing     Problem: Skin Integrity:  Goal: Absence of new skin breakdown  Description: Absence of new skin breakdown  6/16/2021 1040 by Bryant Diaz RN  Outcome: Ongoing

## 2021-06-16 NOTE — PROGRESS NOTES
Inpatient Physical Therapy Evaluation and Treatment    Unit: [address]  Date:  6/16/2021  Patient Name:    Jenny Atkinson  Admitting diagnosis:  Unable to ambulate [R26.2]  Admit Date:  6/15/2021  Precautions/Restrictions/WB Status/ Lines/ Wounds/ Oxygen: Fall risk, Bed/chair alarm, Lines -IV and no push and pull on the L UE (swollen elbow), for safety during evaluation NWB was maintained on the LUE, pacemaker    Treatment Time:  0906 - 0956  Treatment Number:  1   Timed Code Treatment Minutes: 40 minutes  Total Treatment Minutes:  50  minutes    Patient Goals for Therapy: \" Go home \"          Discharge Recommendations: SNF  DME needs for discharge: defer to facility       Therapy recommendation for EMS Transport: can transport by wheelchair    Therapy recommendations for staff:   Assist of 2 with use of rolling walker (RW) and gait belt for all transfers and ambulation to/from [facility]  to/from chair, no push and pull or weight bearing on the L UE    History of Present Illness: The patient is a 80 y.o. male with aortic valve stenosis, hypertension, arthritis, vertigo and remote history of DVT who presents the emergency department today with complaints of arm pain. Patient states that he fell back in January and had pretty significant injury to the left arm. Since that time he has had intermittent pain and swelling usually comes on throughout the day but goes away in the evenings. He states he woke up this morning and had significant pain and swelling to left arm to the point where he could not move the arm at all without severe pain and calling out from pain. He denies any new injury or trauma. He denies any fevers or chills. He states that he is never had anything like this in the past.  Reports that the symptoms are always in the same location in the left elbow and has not had it in any other location.   He reports having Ultram to take at home to help with pain as needed but typically does not need it but (Patient showed posterior LOB during standing activities, but improved towards the end of the session)    Coordination and Tone  WFL    Balance  Sitting:  Fair -; Min A   Comments:     Standing: Poor +; Mod A  and 2 persons  Comments: Patient improved to Fair - with Min A x 2 with dynamic standing activities within 5 min and weight shifting repetition. Bed Mobility   Supine to Sit:    Not Tested  Sit to Supine:   Not Tested  Rolling:   Not Tested  Scooting in sitting: Mod A  and 2 persons  Scooting in supine:  Not Tested    Transfer Training     Sit to stand: Mod A  and 2 persons  Stand to sit:   Mod A  and 2 persons  Bed to Chair:   Mod A  and 2 persons with use of gait belt and rolling walker (RW)    Gait gait completed as indicated below  Distance:      3 ft  Deviations (firm surface/linoleum):  decreased vito, increased JULIA, forward flexed posture, decreased step length bilaterally and decreased stance time bilaterally  Assistive Device Used:    gait belt and rolling walker (RW)  Level of Assist:    Mod A  and 2 persons  Comment: Patient was provided support on the LUE initially but later on he was able to hold the RW well with LUE. Patient needed assist for weight shifting during ambulation. Stair Training deferred, pt does not have stairs in home environment    Activity Tolerance   Pt completed therapy session with Pain noted with during LUE use during functional mobility    Positioning Needs   Pt up in chair, alarm set, positioned in proper neutral alignment and pressure relief provided. Call light provided and all needs within reach    Exercises Initiated  all completed bilaterally unless indicated  Ankle Pumps x 10 reps  LAQ x 10 reps    Other  None.      Patient/Family Education   Pt educated on role of inpatient PT, POC, importance of continued activity, DC recommendations, safety awareness, transfer techniques, energy conservation, pacing activity and calling for assist with mobility. Assessment  Pt seen for Physical Therapy evaluation in acute care setting. Pt demonstrated decreased Activity tolerance, Balance, ROM, Safety and Strength as well as decreased independence with Ambulation, Bed Mobility  and Transfers. Recommending SNF upon discharge as patient functioning well below baseline, demonstrates good rehab potential and unable to return home due to limited or no family support, burden of care beyond caregiver ability, home environment not conducive to patient recovery and limited safety awareness. Goals : To be met in 3 visits:  1). Independent with LE Ex x 10 reps    To be met in 6 visits:  1). Supine to/from sit: Min A   2). Sit to/from stand: Mod A   3). Bed to chair: Mod A   4). Gait: Ambulate 10 ft.   with  Mod A  and use of LRAD (least restrictive assistive device)  5). Tolerate B LE exercises 3 sets of 10-15 reps    Rehabilitation Potential: Good  Strengths for achieving goals include:   Pt motivated and Pt cooperative   Barriers to achieving goals include:    Pain    Plan    To be seen 3-5 x / week  while in acute care setting for therapeutic exercises, bed mobility, transfers, progressive gait training, balance training, and family/patient education. Signature: Sarah Leal, MS PT, # [id number]    If patient discharges from this facility prior to next visit, this note will serve as the Discharge Summary.

## 2021-06-16 NOTE — CARE COORDINATION
Case Management Assessment  Initial Evaluation      Patient Name: Shu Weeks  YOB: 1931  Diagnosis: Unable to ambulate [R26.2]  Date / Time: 6/15/2021  7:07 AM    Admission status/Date:  06/15/2021  Chart Reviewed: Yes      Patient Interviewed: Yes   Family Interviewed:  Yes - SPOUSE      Hospitalization in the last 30 days:  No      Health Care Decision Maker :   Primary Decision Maker: Bernadette Shore - Spouse - [phone]    (CM - must 1st enter selection under Navigator - emergency contact- [place] Relationship and pick relationship)   Who do you trust or have selected to make healthcare decisions for you      Met with: PT AND SPOUSE AT BEDSIDE  Interview conducted  (bedside/phone):phone    Current PCP: Tegan Callaway Medicare  Precert required for SNF : Y, N          3 night stay required - Y, N    ADLS  Support Systems/Care Needs: None  Transportation: self    Meal Preparation: self    Housing  Living Arrangements: Lives w/sp. Amb w/walker.  Wife is WC bound due to MS  Steps: ramp  Intent for return to present living arrangements: Yes  Identified Issues: NA    Home Care Information  Active with Home Health Care : No Agency:(Services)  Type of Home Care Services: Aide Services  Passport/Waiver : No  :                      Phone Number:    Passport/Waiver Services: NA          Durable Medical Equiptment   DME Provider:   Equipment:   Walker_Rollator__Cane___RTS___ BSC___Shower Chair___Hospital Bed___W/C____Other__Lift Chair, Life alert______  02 at ____Liter(s)---wears(frequency)_______ HHN ___ CPAP___ BiPap___   N/A____      Home O2 Use :  No      Informed of need for care provider to bring portable home O2 tank on day of discharge for nursing to connect prior to leaving:   Not Indicated  Verbalized agreement/Understanding:   Not Indicated    Community Service Affiliation  Dialysis:  No    · Agency:  · Location:  · Dialysis Schedule:  · Phone:   · Fax: Other Community Services: (ex:PT/OT,Mental Health,Wound Clinic, Cardio/Pul [address])    DISCHARGE PLAN: Explained Case Management role/services. Chart reviewed. Met with pt and spouse and explained the role of the CM. Plan: STR. Pt/sp  Provided top three facility choices. #1 [facility], referral sent to [facility] for review. +in network, Alt choices [facility] and [facility] [place]).  +CM following

## 2021-06-16 NOTE — CONSULTS
Department of Orthopedic Surgery  Physician Assistant   Consult Note        Reason for Consult:  Left elbow swelling  Requesting Physician: Brian Flower*  Date of Service: 6/16/2021 1:39 PM    CHIEF COMPLAINT:  As Above    History Obtained From:  patient    HISTORY OF PRESENT ILLNESS:                The patient is a 80 y.o. male who presents with above chief complaint. Pt states he has had some chronic swelling of his left arm but recently has noticed increased pain and swelling in the left elbow that has restricted his movement. Denies fevers or chills. Cannot remember any recent injury but fell several months ago and landed directly on the elbow causing the same kind of pain. 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 No shoulder or wrist pain. No n/t in the arm. No wounds that he's noticed. He takes coumadin but INR typically around 2.5-3.0 he states. Past Medical History:        Diagnosis Date    Aortic valve stenosis     Arthritis     shoulder    Hairy cell leukemia, in remission ([facility] [address]     Hypertension     Phlebitis     Pneumonia     Vertigo      Past Surgical History:        Procedure Laterality Date    INTRACAPSULAR CATARACT EXTRACTION Left 3/27/2019    PHACO EMULSIFICATION OF CATARACT WITH INTRAOCULAR LENS IMPLANT EYE performed by Saud Garnett MD at [facility] Right 4/3/2019    PHACO EMULSIFICATION OF CATARACT WITH INTRAOCULAR LENS IMPLANT EYE performed by Saud Garnett MD at [address] [place] [address]/[facility]      2017    SHOULDER SURGERY      SPLENECTOMY           Medications Prior to Admission:   Prior to Admission medications    Medication Sig Start Date End Date Taking?  Authorizing Provider   finasteride (PROSCAR) 5 MG tablet Take 5 mg by mouth daily   Yes Historical Provider, MD   tamsulosin (FLOMAX) 0.4 MG capsule Take 0.4 mg by mouth daily   Yes Historical Provider, MD atorvastatin (LIPITOR) 10 MG tablet Take 40 mg by mouth daily    Yes Historical Provider, MD   traMADol (ULTRAM) 50 MG tablet Take 50 mg by mouth every 6 hours as needed for Pain. Yes Historical Provider, MD   psyllium (KONSYL) 28.3 % PACK Take 1 packet by mouth daily   Yes Historical Provider, MD   warfarin (COUMADIN) 5 MG tablet Take 5 mg by mouth daily   Yes Historical Provider, MD   Multiple Vitamins-Minerals (THERAPEUTIC MULTIVITAMIN-MINERALS) tablet Take 1 tablet by mouth daily   Yes Historical Provider, MD   lisinopril (PRINIVIL;ZESTRIL) 40 MG tablet  9/9/14  Yes Historical Provider, MD       Allergies:  Patient has no known allergies. Social History:    Tobacco:  reports that he has never smoked. He has never used smokeless tobacco.   Alcohol:  reports no history of alcohol use. Illicit Drug: No  Family History:       Problem Relation Age of Onset    Rheumatologic Disease Mother     Arthritis Mother     Cancer Paternal Grandmother     Anesth Problems Neg Hx     Broken Bones Neg Hx     Clotting Disorder Neg Hx     Collagen Disease Neg Hx     Diabetes Neg Hx     Dislocations Neg Hx     Osteoporosis Neg Hx     Scoliosis Neg Hx     Severe Sprains Neg Hx        REVIEW OF SYSTEMS:    CONSTITUTIONAL:  negative  MUSCULOSKELETAL:  positive for  pain  All other systems reviewed and negative    PHYSICAL EXAM:    awake, alert, cooperative, no apparent distress, and appears stated age  MUSCULOSKELETAL:  there is no redness, warmth, or swelling of the joints  full range of motion noted  motor strength is 5 out of 5 all extremities bilaterally  tone is normal  with exception of  LEFT ELBOW:  redness minimal along the lateral forearm into the elbow. No specific erythema of the elbow. No wounds noted. warmth absent  swelling present  tenderness present and noted globally around the elbow with some palpation. No pain over olecranon bursa. Sensation intact to touch.  Increased pain with flexion and currently. Would plan to f/u with him as outpatient to ensure this is resolving but knowing it will take some time to improve. If his symptoms change or exam worsens we are happy to re-evaluate him. Thank you for the opportunity to consult on this patient.     Edda Chan

## 2021-06-16 NOTE — PROGRESS NOTES
Bedside report given and pt care transferred to [facility]. Pt denies needs at this time. Call light within reach.

## 2021-06-16 NOTE — ACP (ADVANCE CARE PLANNING)
Advance Care Planning   Healthcare Decision Maker:    Primary Decision Maker: Pranav David - [phone]    +Active DNR CCA order  Today we documented Decision Maker(s) consistent with Legal Next of Kin hierarchy.

## 2021-06-17 LAB
ANISOCYTOSIS: ABNORMAL
BASOPHILS ABSOLUTE: 0.1 K/UL (ref 0–0.2)
BASOPHILS RELATIVE PERCENT: 1.8 %
EOSINOPHILS ABSOLUTE: 0 K/UL (ref 0–0.6)
EOSINOPHILS RELATIVE PERCENT: 0 %
HCT VFR BLD CALC: 26.5 % (ref 40.5–52.5)
HEMOGLOBIN: 9 G/DL (ref 13.5–17.5)
INR BLD: 1.65 (ref 0.86–1.14)
LYMPHOCYTES ABSOLUTE: 1.6 K/UL (ref 1–5.1)
LYMPHOCYTES RELATIVE PERCENT: 19.1 %
MCH RBC QN AUTO: 32.4 PG (ref 26–34)
MCHC RBC AUTO-ENTMCNC: 34 G/DL (ref 31–36)
MCV RBC AUTO: 95.1 FL (ref 80–100)
MONOCYTES ABSOLUTE: 1 K/UL (ref 0–1.3)
MONOCYTES RELATIVE PERCENT: 12.3 %
NEUTROPHILS ABSOLUTE: 5.5 K/UL (ref 1.7–7.7)
NEUTROPHILS RELATIVE PERCENT: 66.8 %
PDW BLD-RTO: 16.5 % (ref 12.4–15.4)
PLATELET # BLD: 56 K/UL (ref 135–450)
PLATELET SLIDE REVIEW: ABNORMAL
PMV BLD AUTO: 12.5 FL (ref 5–10.5)
POIKILOCYTES: ABNORMAL
PROTHROMBIN TIME: 19.2 SEC (ref 10–13.2)
RBC # BLD: 2.78 M/UL (ref 4.2–5.9)
SLIDE REVIEW: ABNORMAL
WBC # BLD: 8.2 K/UL (ref 4–11)

## 2021-06-17 PROCEDURE — 6370000000 HC RX 637 (ALT 250 FOR IP): Performed by: NURSE PRACTITIONER

## 2021-06-17 PROCEDURE — 97140 MANUAL THERAPY 1/> REGIONS: CPT

## 2021-06-17 PROCEDURE — 97530 THERAPEUTIC ACTIVITIES: CPT

## 2021-06-17 PROCEDURE — 2580000003 HC RX 258: Performed by: NURSE PRACTITIONER

## 2021-06-17 PROCEDURE — 1200000000 HC SEMI PRIVATE

## 2021-06-17 PROCEDURE — 97116 GAIT TRAINING THERAPY: CPT

## 2021-06-17 PROCEDURE — 85610 PROTHROMBIN TIME: CPT

## 2021-06-17 PROCEDURE — 6370000000 HC RX 637 (ALT 250 FOR IP): Performed by: PHYSICIAN ASSISTANT

## 2021-06-17 PROCEDURE — 85025 COMPLETE CBC W/AUTO DIFF WBC: CPT

## 2021-06-17 PROCEDURE — 6370000000 HC RX 637 (ALT 250 FOR IP): Performed by: INTERNAL MEDICINE

## 2021-06-17 PROCEDURE — G0378 HOSPITAL OBSERVATION PER HR: HCPCS

## 2021-06-17 PROCEDURE — 36415 COLL VENOUS BLD VENIPUNCTURE: CPT

## 2021-06-17 PROCEDURE — 97110 THERAPEUTIC EXERCISES: CPT

## 2021-06-17 PROCEDURE — 99232 SBSQ HOSP IP/OBS MODERATE 35: CPT | Performed by: INTERNAL MEDICINE

## 2021-06-17 PROCEDURE — 97112 NEUROMUSCULAR REEDUCATION: CPT

## 2021-06-17 RX ORDER — WARFARIN SODIUM 5 MG/1
5 TABLET ORAL
Status: COMPLETED | OUTPATIENT
Start: 2021-06-17 | End: 2021-06-17

## 2021-06-17 RX ADMIN — WARFARIN SODIUM 5 MG: 5 TABLET ORAL at 18:19

## 2021-06-17 RX ADMIN — TAMSULOSIN HYDROCHLORIDE 0.4 MG: 0.4 CAPSULE ORAL at 08:58

## 2021-06-17 RX ADMIN — Medication 1 TABLET: at 08:58

## 2021-06-17 RX ADMIN — ATORVASTATIN CALCIUM 40 MG: 40 TABLET, FILM COATED ORAL at 08:58

## 2021-06-17 RX ADMIN — TRIAMTERENE AND HYDROCHLOROTHIAZIDE 1 TABLET: 37.5; 25 TABLET ORAL at 08:58

## 2021-06-17 RX ADMIN — Medication 10 ML: at 08:58

## 2021-06-17 RX ADMIN — FINASTERIDE 5 MG: 5 TABLET, FILM COATED ORAL at 08:58

## 2021-06-17 RX ADMIN — AMOXICILLIN AND CLAVULANATE POTASSIUM 1 TABLET: 500; 125 TABLET, FILM COATED ORAL at 21:29

## 2021-06-17 RX ADMIN — AMOXICILLIN AND CLAVULANATE POTASSIUM 1 TABLET: 500; 125 TABLET, FILM COATED ORAL at 08:58

## 2021-06-17 RX ADMIN — LISINOPRIL 40 MG: 20 TABLET ORAL at 08:58

## 2021-06-17 RX ADMIN — Medication 10 ML: at 21:29

## 2021-06-17 ASSESSMENT — PAIN DESCRIPTION - PROGRESSION
CLINICAL_PROGRESSION: GRADUALLY IMPROVING

## 2021-06-17 ASSESSMENT — PAIN DESCRIPTION - LOCATION
LOCATION: ELBOW
LOCATION: ELBOW

## 2021-06-17 ASSESSMENT — PAIN DESCRIPTION - ORIENTATION
ORIENTATION: LEFT
ORIENTATION: LEFT

## 2021-06-17 ASSESSMENT — PAIN DESCRIPTION - DIRECTION
RADIATING_TOWARDS: SHOULDER
RADIATING_TOWARDS: SHOULDER

## 2021-06-17 ASSESSMENT — PAIN DESCRIPTION - FREQUENCY: FREQUENCY: CONTINUOUS

## 2021-06-17 ASSESSMENT — PAIN DESCRIPTION - ONSET: ONSET: ON-GOING

## 2021-06-17 ASSESSMENT — PAIN DESCRIPTION - DESCRIPTORS
DESCRIPTORS: ACHING
DESCRIPTORS: ACHING

## 2021-06-17 ASSESSMENT — PAIN DESCRIPTION - PAIN TYPE
TYPE: ACUTE PAIN
TYPE: ACUTE PAIN

## 2021-06-17 ASSESSMENT — PAIN SCALES - GENERAL
PAINLEVEL_OUTOF10: 6
PAINLEVEL_OUTOF10: 7

## 2021-06-17 ASSESSMENT — PAIN - FUNCTIONAL ASSESSMENT: PAIN_FUNCTIONAL_ASSESSMENT: PREVENTS OR INTERFERES WITH MANY ACTIVE NOT PASSIVE ACTIVITIES

## 2021-06-17 NOTE — PROGRESS NOTES
indicated  Ankle Pumps x 10 reps  LAQ x 10 reps  marching x 10 reps  Hip abduction: x 10 reps  Hip adduction/pillow squeeze: x 10 reps    Balance  Sitting:  Fair -; Min A   Comments: ~30 min    Standing: Poor+; Mod A  and 2 persons  Comments: ~3 min x 3 reps. Patient Education      Role of PT, POC, Discharge recommendations, DC recommendations, safety awareness, transfer techniques, pursed lip breathing, energy conservation, pacing activity and calling for assist with mobility. Positioning Needs       Pt in bed, alarm set, positioned in proper neutral alignment and pressure relief provided. Call light provided and all needs within reach, elevated LUE with pillows. Patient's recliner chair sitting surface was raised using thick blankets to assist in sit to stand transfers from the recliner. ROM Measurements N/A  Knee Flexion:  Knee Extension:     Activity Tolerance   Pt completed therapy session with Pain noted with all functional mobility. SOB noted with with all functional mobility, patient maintained SpO2 above 92% throughout the session. .    Other  N/A    Assessment :  Patient tolerated the session well with regular rest breaks but needed increased level of assistance today for ambulation and functional transfers. Recommending SNF upon discharge as patient functioning well below baseline, demonstrates good rehab potential and unable to return home due to limited or no family support, burden of care beyond caregiver ability, home environment not conducive to patient recovery and limited safety awareness. Goals (all goals ongoing unless otherwise indicated)  To be met in 3 visits:  1). Independent with LE Ex x 10 reps     To be met in 6 visits:  1). Supine to/from sit: Min A   2). Sit to/from stand: Mod A   3). Bed to chair: Mod A   4). Gait: Ambulate 10 ft.   with  Mod A  and use of LRAD (least restrictive assistive device)  5).   Tolerate B LE exercises 3 sets of 10-15 reps    Plan Continue with plan of care. Signature: Michelle Bertrand, MS PT, # [id number]    If patient discharges from this facility prior to next visit, this note will serve as the Discharge Summary.

## 2021-06-17 NOTE — PLAN OF CARE
Problem: Falls - Risk of:  Goal: Will remain free from falls  Description: Will remain free from falls  6/16/2021 2114 by Charlene Segundo RN  Outcome: Ongoing  6/16/2021 1040 by Kimberli Peraza RN  Outcome: Ongoing  Goal: Absence of physical injury  Description: Absence of physical injury  6/16/2021 2114 by Charlene Segundo RN  Outcome: Ongoing  6/16/2021 1040 by Kimberli Peraza RN  Outcome: Ongoing     Problem: Skin Integrity:  Goal: Will show no infection signs and symptoms  Description: Will show no infection signs and symptoms  6/16/2021 2114 by Charlene Segundo RN  Outcome: Ongoing  6/16/2021 1040 by Kimberli Peraza RN  Outcome: Ongoing  Goal: Absence of new skin breakdown  Description: Absence of new skin breakdown  6/16/2021 2114 by Charlene Segundo RN  Outcome: Ongoing  6/16/2021 1040 by Kimberli Peraza RN  Outcome: Ongoing

## 2021-06-17 NOTE — PROGRESS NOTES
Occupational Therapy Daily Treatment Note    Unit: 2 [facility]  Date:  6/17/2021  Patient Name:    Mark Pierce  Admitting diagnosis:  Unable to ambulate [R26.2]  Admit Date:  6/15/2021  Precautions/Restrictions:  fall risk, IV and bed/chair alarm    6/17: Received medical clearance from CHRISTINA Worrell PA-C to complete manual lymphatic drainage and compression on L UE. Discharge Recommendations: SNF  DME needs for discharge: defer to facility       Therapy recommendations for staff:   Assist of 2 (minimal assist) with use of rolling walker (RW) for all transfers to/from BSC/chair    AM-PAC Score: AM-PAC Inpatient Daily Activity Raw Score: 10  Home Health S4 Level: NA       Treatment Time:  11:10-11:50, 16:00-16:30  Treatment number:  2    Total Treatment Time:   40 minutes    History of Present Illness: The patient is a 80 y. o. male with aortic valve stenosis, hypertension, arthritis, vertigo and remote history of DVT who presents the emergency department today with complaints of arm pain.  Patient states that he fell back in January and had pretty significant injury to the left arm.  Since that time he has had intermittent pain and swelling usually comes on throughout the day but goes away in the evenings. Adela Ozuna states he woke up this morning and had significant pain and swelling to left arm to the point where he could not move the arm at all without severe pain and calling out from pain.  He denies any new injury or trauma.  He denies any fevers or chills.  He states that he is never had anything like this in the past.  Reports that the symptoms are always in the same location in the left elbow and has not had it in any other location. Adela Ozuna reports having Ultram to take at home to help with pain as needed but typically does not need it but even this was not helping.  He does live at home with his wife who is in a wheelchair and reports that it is hard for him to take care of himself at his baseline and showering and other ADLs are difficult.  He typically uses a walker to get around. Subjective:  Patient supine in bed and agreeable to treatment. Pain   Yes  Rating: moderate  Location: L elbow with movement  Pain Medicine Status: Denies need      Bed Mobility:   Supine to Sit:  Mod A  Sit to Supine:  Not Tested  Rolling:           Not Tested  Scooting: Mod A    Transfer Training:   Sit to stand: Mod A  Stand to sit:  Mod A  Bed to Chair:  Min A and with use of RW  Bed to [facility]:   Not Tested  Standard toilet:   Not Tested    Activity Tolerance   Pt completed therapy session with No adverse symptoms noted w/activity  SpO2:   HR:   BP:     ADL Training:   Upper body dressing:  Not Tested  Upper body bathing:  Not Tested  Lower body dressing:  Not Tested  Lower body bathing:  Not Tested  Toileting:   Not Tested  Grooming/Hygiene:  Not Tested    Therapeutic Exercise: completed exercises with L UE to decrease swelling  Hand flex/ext:  x10  Wrist flex/ext:  x10   Elbow flex/ext: x10    11:30- Completed manual lymphatic drainage on L UE. Donned tubigrip to provide compression to L UE.    16:00- Therapist assessed patient's tolerance for tubigrip. Improved swelling in forearm but continues to have increased swelling in hand and elbow. Donned compression glove first and then tubigrip E from wrist to axillary region. Educated RN and wife on doffing compression glove and stocking at night and donning in the morning. Can remove stocking at any time if causing pain or skin irritation. Patient Education:   Role of OT  Recommendations for DC  Safe RW use/hand placement    Positioning Needs:   Up in chair, call light and needs in reach. Alarm Set   L UE elevated on pillows    Family Present:  No    Assessment: Patient demonstrated improved transfer to chair and tolerance of L UE AROM. Patient will need SNF at DC to continue to improve independence and activity tolerance. GOALS  To be met in 3 Visits:  1). Bed to toilet/BSC: Mod A     To be met in 5 Visits:  1). Supine to/from Sit:             Mod A  2). Upper Body Bathing:         Supervision  3). Lower Body Bathing: Mod A  4). Upper Body Dressing:       Min A  5). Lower Body Dressing: Mod A  6).  Pt to demonstrate UE exs x 15 reps with minimal cues    Plan: cont with [facility], OTR/L #[id number]      If patient discharges from this facility prior to next visit, this note will serve as the Discharge Summary

## 2021-06-17 NOTE — PROGRESS NOTES
Pt offered PRN pain medications for pain rating of 6 on 0-10 pain scale. Pt refused all offered medications. Cold applied to arm and warm blanket given for legs.

## 2021-06-17 NOTE — PLAN OF CARE
Problem: Falls - Risk of:  Goal: Will remain free from falls  Description: Will remain free from falls  6/17/2021 1043 by Karlynn Hammans, RN  Outcome: Ongoing     Problem: Falls - Risk of:  Goal: Absence of physical injury  Description: Absence of physical injury  6/17/2021 1043 by Karlynn Hammans, RN  Outcome: Ongoing     Problem: Skin Integrity:  Goal: Will show no infection signs and symptoms  Description: Will show no infection signs and symptoms  6/17/2021 1043 by Karlynn Hammans, RN  Outcome: Ongoing     Problem: Skin Integrity:  Goal: Absence of new skin breakdown  Description: Absence of new skin breakdown  6/17/2021 1043 by Karlynn Hammans, RN  Outcome: Ongoing

## 2021-06-17 NOTE — PROGRESS NOTES
Pharmacy Note  Warfarin Consult  Dx:   Goal INR range 2-3 or 2.5-3.5  Home Warfarin dose:  New start Warfarin with Heparin/Enoxaparin bridge. Would recommend continue bridge until INR therapeutic. Date  INR  Warfarin  6/15                3.9                  Vit K 10mg  6/16                3.89                 hold  6/17                1.65               5mg    Recommend Warfarin 5mg tonight x1. Daily INR ordered. Rx will continue to manage therapy per consult order.   Huy Rich Pharm D 6/17/20211:23 PM  .

## 2021-06-17 NOTE — PROGRESS NOTES
Bedside Mobility Assessment Tool (BMAT):     Assessment Level 1- Sit and Shake    1. From a semi-reclined position, ask patient to sit up and rotate to a seated position at the side of the bed. Can use the bedrail. 2. Ask patient to reach out and grab your hand and shake making sure patient reaches across his/her midline. Pass- Patient is able to come to a seated position, maintain core strength. Maintains seated balance while reaching across midline. Move on to Assessment Level 2. Assessment Level 2- Stretch and Point   1. With patient in seated position at the side of the bed, have patient place both feet on the floor (or stool) with knees no higher than hips. 2. Ask patient to stretch one leg and straighten the knee, then bend the ankle/flex and point the toes. If appropriate, repeat with the other leg. Pass- Patient is able to demonstrate appropriate quad strength on intended weight bearing limb(s). Move onto Assessment Level 3. Assessment Level 3- Stand   1. Ask patient to elevate off the bed or chair (seated to standing) using an assistive device (cane, bedrail). 2. Patient should be able to raise buttocks off be and hold for a count of five. May repeat once. Fail- Patient unable to demonstrate standing stability. Patient is MOBILITY LEVEL 3. Assessment Level 4- Walk   1. Ask patient to march in place at bedside. 2. Then ask patient to advance step and return each foot. Some medical conditions may render a patient from stepping backwards, use your best clinical judgement. Fail- Patient not able to complete tasks OR requires use of assistive device. Patient is MOBILITY LEVEL 3.        Mobility Level- 3

## 2021-06-17 NOTE — PROGRESS NOTES
Progress Note    Admit Date:  6/15/2021    Subjective:  Mr. Rochelle Langley states he is feeling ok today. Continues to have swelling to his left elbow. Has some improvement in ROM at the left elbow, though not much. Objective:   No data found. Intake/Output Summary (Last 24 hours) at 6/17/2021 0747  Last data filed at 6/16/2021 2222  Gross per 24 hour   Intake 557.28 ml   Output 1300 ml   Net -742.72 ml       Physical Exam:  Gen: Elderly male, no distress. Alert. Sitting up in his chair  Eyes: No conjunctival injection. ENT: No discharge. Pharynx clear. Neck:  Trachea midline. Resp: No accessory muscle use. No crackles. No wheezes. No rhonchi. On Ra  CV: Regular rate. Irregular rhythm. + murmur. Bilateral lower extremity edema. Capillary Refill: Brisk,< 3 seconds   Peripheral Pulses: +1 palpable, equal bilaterally   GI: Non-tender. Non-distended. Normal bowel sounds. Lesly-umbilical hernia, soft, easily reducible   Skin: Warm and dry. Bilateral lower extremities swelling and scaling with chronic skin changes   M/S: + Significant edema to the left upper extremity with large joint effusion to the left elbow with severe pain with either passive or active movement and tenderness at the joint line only, no bony tenderness to palpation, good range of motion of the left wrist and digits, all compartments are soft  Neuro: Awake. Grossly non-focal, moves all extremities, no dysarthria, follows commands    Psych: Oriented x 3. No anxiety or agitation.         Scheduled Meds:   amoxicillin-clavulanate  1 tablet Oral 2 times per day    finasteride  5 mg Oral Daily    lisinopril  40 mg Oral Daily    therapeutic multivitamin-minerals  1 tablet Oral Daily    tamsulosin  0.4 mg Oral Daily    triamterene-hydroCHLOROthiazide  1 tablet Oral Daily    sodium chloride flush  5-40 mL Intravenous 2 times per day    atorvastatin  40 mg Oral Daily    warfarin (COUMADIN) daily dosing (placeholder)   Other RX Placeholder is very concerning for possible septic arthritis  - Pt does have elevated INR and low platelet counts, give vitamin K x1 to reverse INR, may need platelet transfusion if requiring OR  - ortho consult - await recommendations  - PT/OT: Need placement as pt does require a walker to safely get around his home  - IV Vanc/Unasyn D#2 dc iv abx  Likely hemarthrosis. Discussed with Ortho. Discontinue IV antibiotics. Switch to Augmentin for possible cellulitis. Discussed with Ortho PA       Supra-therapeutic INR  - elevated INR 3.90 > 3.89  - No acute bleeding concerns  - Vitamin K 10 subcu x1 with concerns for possible need for procedure  - Would monitor closely, may need to start heparin in the morning     Atrial fibrillation  Aortic valve stenosis  Status post TAVR  - AC on Coumadin  - Supratherapeutic INR, see above--> worsening INR, may require heparin drip  - s/p pacemaker     Thrombocytopenia  - Plt 56 > 52  - Has had similar issues in the past  - Was discontinued off his aspirin  - No acute bleeding  - Monitor platelets closely, may require platelet transfusion if needs to go to the OR for the left elbow    Normocytic Anemia  - Hgb 9.5 on arrival  - previously 8.7 on 5/3/2021  - 9 today      HTN  - uncontrolled - with SBP in 140s  - Continue home lisinipril     Hx of Hairy Cell Leukemia   - Follows with oncology     S/p splenectomy  - 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

## 2021-06-18 ENCOUNTER — APPOINTMENT (OUTPATIENT)
Dept: VASCULAR LAB | Age: 86
DRG: 554 | End: 2021-06-18
Payer: MEDICARE

## 2021-06-18 VITALS
TEMPERATURE: 97.4 F | HEIGHT: 69 IN | HEART RATE: 73 BPM | OXYGEN SATURATION: 96 % | RESPIRATION RATE: 18 BRPM | DIASTOLIC BLOOD PRESSURE: 73 MMHG | WEIGHT: 175 LBS | BODY MASS INDEX: 25.92 KG/M2 | SYSTOLIC BLOOD PRESSURE: 121 MMHG

## 2021-06-18 LAB
ANION GAP SERPL CALCULATED.3IONS-SCNC: 6 MMOL/L (ref 3–16)
BUN BLDV-MCNC: 19 MG/DL (ref 7–20)
CALCIUM SERPL-MCNC: 8.2 MG/DL (ref 8.3–10.6)
CHLORIDE BLD-SCNC: 102 MMOL/L (ref 99–110)
CO2: 26 MMOL/L (ref 21–32)
CREAT SERPL-MCNC: 0.9 MG/DL (ref 0.8–1.3)
GFR AFRICAN AMERICAN: >60
GFR NON-AFRICAN AMERICAN: >60
GLUCOSE BLD-MCNC: 95 MG/DL (ref 70–99)
HCT VFR BLD CALC: 26.4 % (ref 40.5–52.5)
HEMOGLOBIN: 9.1 G/DL (ref 13.5–17.5)
INR BLD: 1.31 (ref 0.86–1.14)
MCH RBC QN AUTO: 32.5 PG (ref 26–34)
MCHC RBC AUTO-ENTMCNC: 34.7 G/DL (ref 31–36)
MCV RBC AUTO: 93.8 FL (ref 80–100)
PDW BLD-RTO: 16.6 % (ref 12.4–15.4)
PLATELET # BLD: 52 K/UL (ref 135–450)
PLATELET SLIDE REVIEW: ABNORMAL
PMV BLD AUTO: 10.8 FL (ref 5–10.5)
POTASSIUM SERPL-SCNC: 3.5 MMOL/L (ref 3.5–5.1)
PROTHROMBIN TIME: 15.2 SEC (ref 10–13.2)
RBC # BLD: 2.81 M/UL (ref 4.2–5.9)
SLIDE REVIEW: ABNORMAL
SODIUM BLD-SCNC: 134 MMOL/L (ref 136–145)
WBC # BLD: 8 K/UL (ref 4–11)

## 2021-06-18 PROCEDURE — 6370000000 HC RX 637 (ALT 250 FOR IP): Performed by: PHYSICIAN ASSISTANT

## 2021-06-18 PROCEDURE — 2580000003 HC RX 258: Performed by: NURSE PRACTITIONER

## 2021-06-18 PROCEDURE — G0378 HOSPITAL OBSERVATION PER HR: HCPCS

## 2021-06-18 PROCEDURE — 85027 COMPLETE CBC AUTOMATED: CPT

## 2021-06-18 PROCEDURE — 97530 THERAPEUTIC ACTIVITIES: CPT

## 2021-06-18 PROCEDURE — 93971 EXTREMITY STUDY: CPT

## 2021-06-18 PROCEDURE — 85610 PROTHROMBIN TIME: CPT

## 2021-06-18 PROCEDURE — 36415 COLL VENOUS BLD VENIPUNCTURE: CPT

## 2021-06-18 PROCEDURE — 6370000000 HC RX 637 (ALT 250 FOR IP): Performed by: NURSE PRACTITIONER

## 2021-06-18 PROCEDURE — 6370000000 HC RX 637 (ALT 250 FOR IP): Performed by: INTERNAL MEDICINE

## 2021-06-18 PROCEDURE — 97116 GAIT TRAINING THERAPY: CPT

## 2021-06-18 PROCEDURE — 80048 BASIC METABOLIC PNL TOTAL CA: CPT

## 2021-06-18 PROCEDURE — 99239 HOSP IP/OBS DSCHRG MGMT >30: CPT | Performed by: INTERNAL MEDICINE

## 2021-06-18 RX ORDER — TRAMADOL HYDROCHLORIDE 50 MG/1
50 TABLET ORAL EVERY 6 HOURS PRN
Qty: 8 TABLET | Refills: 0 | Status: SHIPPED | OUTPATIENT
Start: 2021-06-18 | End: 2021-06-20

## 2021-06-18 RX ORDER — AMOXICILLIN AND CLAVULANATE POTASSIUM 500; 125 MG/1; MG/1
1 TABLET, FILM COATED ORAL EVERY 12 HOURS SCHEDULED
Qty: 8 TABLET | Refills: 0
Start: 2021-06-18 | End: 2021-06-22

## 2021-06-18 RX ORDER — WARFARIN SODIUM 5 MG/1
5 TABLET ORAL
Status: COMPLETED | OUTPATIENT
Start: 2021-06-18 | End: 2021-06-18

## 2021-06-18 RX ADMIN — Medication 1 TABLET: at 09:12

## 2021-06-18 RX ADMIN — ATORVASTATIN CALCIUM 40 MG: 40 TABLET, FILM COATED ORAL at 09:12

## 2021-06-18 RX ADMIN — LISINOPRIL 40 MG: 20 TABLET ORAL at 09:12

## 2021-06-18 RX ADMIN — TAMSULOSIN HYDROCHLORIDE 0.4 MG: 0.4 CAPSULE ORAL at 09:12

## 2021-06-18 RX ADMIN — FINASTERIDE 5 MG: 5 TABLET, FILM COATED ORAL at 09:12

## 2021-06-18 RX ADMIN — Medication 10 ML: at 09:13

## 2021-06-18 RX ADMIN — AMOXICILLIN AND CLAVULANATE POTASSIUM 1 TABLET: 500; 125 TABLET, FILM COATED ORAL at 09:12

## 2021-06-18 RX ADMIN — WARFARIN SODIUM 5 MG: 5 TABLET ORAL at 16:56

## 2021-06-18 RX ADMIN — TRIAMTERENE AND HYDROCHLOROTHIAZIDE 1 TABLET: 37.5; 25 TABLET ORAL at 09:12

## 2021-06-18 ASSESSMENT — PAIN DESCRIPTION - PROGRESSION
CLINICAL_PROGRESSION: GRADUALLY IMPROVING

## 2021-06-18 NOTE — PLAN OF CARE
Problem: Falls - Risk of:  Goal: Will remain free from falls  Description: Will remain free from falls  6/18/2021 1024 by Lenny Guo RN  Outcome: Ongoing     Problem: Falls - Risk of:  Goal: Absence of physical injury  Description: Absence of physical injury  6/18/2021 1024 by Lenny Guo RN  Outcome: Ongoing     Problem: Skin Integrity:  Goal: Will show no infection signs and symptoms  Description: Will show no infection signs and symptoms  6/18/2021 1024 by Lenny Guo RN  Outcome: Ongoing     Problem: Skin Integrity:  Goal: Absence of new skin breakdown  Description: Absence of new skin breakdown  6/18/2021 1024 by Lenny Guo RN  Outcome: Ongoing     Problem: Pain:  Goal: Pain level will decrease  Description: Pain level will decrease  6/18/2021 1024 by Lenny Guo RN  Outcome: Ongoing     Problem: Pain:  Goal: Control of acute pain  Description: Control of acute pain  6/18/2021 1024 by Lenny Guo RN  Outcome: Ongoing     Problem: Pain:  Goal: Control of chronic pain  Description: Control of chronic pain  6/18/2021 1024 by Lenny Guo RN  Outcome: Ongoing

## 2021-06-18 NOTE — PROGRESS NOTES
PM assessment completed, see flowsheet. Removed stocking and glove on patients left hand for the night, still elevated on pillows. Emptied urinals for patient as well. Call light within reach, bed in lowest position. No needs or complaints at this time.

## 2021-06-18 NOTE — PLAN OF CARE
Problem: Falls - Risk of:  Goal: Will remain free from falls  Description: Will remain free from falls  6/18/2021 1526 by Mark Stokes RN  Outcome: Completed  6/18/2021 1024 by Mark Stokes RN  Outcome: Ongoing  Goal: Absence of physical injury  Description: Absence of physical injury  6/18/2021 1526 by Mark Stokes RN  Outcome: Completed  6/18/2021 1024 by Mark Stokes RN  Outcome: Ongoing     Problem: Skin Integrity:  Goal: Will show no infection signs and symptoms  Description: Will show no infection signs and symptoms  6/18/2021 1526 by Mark Stokes RN  Outcome: Completed  6/18/2021 1024 by Mark Stokes RN  Outcome: Ongoing  Goal: Absence of new skin breakdown  Description: Absence of new skin breakdown  6/18/2021 1526 by Mark Stokes RN  Outcome: Completed  6/18/2021 1024 by Mark Stokes RN  Outcome: Ongoing     Problem: Pain:  Goal: Pain level will decrease  Description: Pain level will decrease  6/18/2021 1526 by Mark Stokes RN  Outcome: Completed  6/18/2021 1024 by Mark Stokes RN  Outcome: Ongoing  Goal: Control of acute pain  Description: Control of acute pain  6/18/2021 1526 by Mark Stokes RN  Outcome: Completed  6/18/2021 1024 by Mark Stokes RN  Outcome: Ongoing  Goal: Control of chronic pain  Description: Control of chronic pain  6/18/2021 1526 by Mark Stokes RN  Outcome: Completed  6/18/2021 1024 by Mark Stokes RN  Outcome: Ongoing

## 2021-06-18 NOTE — PROGRESS NOTES
Inpatient Physical Therapy Daily Treatment Note    Unit: 2 [address]  Date:  6/18/2021  Patient Name:    Fortino Cochran  Admitting diagnosis:  Unable to ambulate [R26.2]  Admit Date:  6/15/2021  Precautions/Restrictions:  Fall risk, Bed/chair alarm, Lines -IV and no push and pull on the L UE (swollen elbow), for safety during evaluation NWB was maintained on the LUE, pacemaker      Discharge Recommendations: SNF  DME needs for discharge: defer to facility       Therapy recommendation for EMS Transport: requires transport by cot due to need of lift equipment for functional transfers    Therapy recommendations for staff:   Assist of 2 (Mod to Max A from standard height, Min A from raised bed to chair surface) with use of rolling walker (RW) and gait belt for all transfers and ambulation to/from [facility]  to/from chair (used raised bed surface to assist in functional transfers), no push and pull or weight bearing on the L UE    History of Present Illness: The patient is a 80 y. o. male with aortic valve stenosis, hypertension, arthritis, vertigo and remote history of DVT who presents the emergency department today with complaints of arm pain.  Patient states that he fell back in January and had pretty significant injury to the left arm.  Since that time he has had intermittent pain and swelling usually comes on throughout the day but goes away in the evenings. [facility] states he woke up this morning and had significant pain and swelling to left arm to the point where he could not move the arm at all without severe pain and calling out from pain.  He denies any new injury or trauma.  He denies any fevers or chills.  He states that he is never had anything like this in the past.  Reports that the symptoms are always in the same location in the left elbow and has not had it in any other location. [facility] reports having Ultram to take at home to help with pain as needed but typically does not need it but even this was not helping. [facility] does live at home with his wife who is in a wheelchair and reports that it is hard for him to take care of himself at his baseline and showering and other ADLs are difficult.  He typically uses a walker to get around. Home Health S4 Level Recommendation: NA  AM-PAC Mobility Score   AM-PAC Inpatient Mobility Raw Score : 11  AM-PAC Inpatient Mobility without Stair Climbing Raw Score : 10    Treatment Time: 11:28 - 12:07  Treatment number: 3  Timed Code Treatment Minutes: 39 minutes  Total Treatment Minutes:  39  minutes    Cognition    A&O x4   Able to follow 2 step commands    Subjective  Patient sitting up in chair with no family present   Pt agreeable to this PT tx. States his elbow ROM is improving and pain is decreasing. Pain   Yes  Location: bilateral shoulders and L elbow and forearm  Rating:    moderate/10  Pain Medicine Status: RN notified     Bed Mobility   Supine to Sit:    Mod A   Sit to Supine:   Not Tested  Rolling:   Min A   Scooting:    Mod A  in sitting    Transfer Training     Sit to stand:   Min A , Mod A  and 2 persons from EOB and from chair  Stand to sit:   Min A , Mod A  and 2 persons for eccentric descend   Bed to Chair:   Mod A  and 2 persons with use of gait belt and rolling walker (RW)     Gait Training gait completed as indicated below  Distance:      6 ft (with chair follow) + 3 ft (transfer bed to chair)  Deviations (firm surface/linoleum):  decreased vito, increased JULIA, forward flexed posture, shuffles, decreased step length bilaterally and decreased stance time bilaterally  Assistive Device Used:    gait belt and rolling walker (RW)  Level of Assist:    Min A , Mod A  and 2 persons  Comment: assist needed for RW navigation     Stair Training deferred, pt does not have stairs in the home environment    Therapeutic Exercise all completed bilaterally unless indicated and Blue deferred secondary to treatment focus on functional mobility  Standing shoulder blade squeeze x10 reps  Standing glute sets x10 reps    Balance  Sitting:  Fair -; Min A   Comments: ~20 min    Standing: Fair -+; CGA with RW  Comments: x5 min to complete standing therex    Patient Education      Role of PT, POC, Discharge recommendations, DC recommendations, safety awareness, transfer techniques, pursed lip breathing, energy conservation, pacing activity and calling for assist with mobility. Positioning Needs       Pt up in chair, alarm set, positioned in proper neutral alignment and pressure relief provided. Call light provided and all needs within reach, elevated LUE with pillows. ROM Measurements N/A  Knee Flexion:  Knee Extension:     Activity Tolerance   Pt completed therapy session with No adverse symptoms noted w/activity. Other  N/A    Assessment :  Patient with good tolerance for session, cooperative with all participation. Required rest breaks throughout session 2/2 pain in B shoulders and fatigue. Demonstrated improved gait distance this date. Continue to progress wt bearing activities and gait as pt tolerates. Recommending SNF upon discharge as patient functioning well below baseline, demonstrates good rehab potential and unable to return home due to limited or no family support, burden of care beyond caregiver ability, home environment not conducive to patient recovery and limited safety awareness. Goals (all goals ongoing unless otherwise indicated)  To be met in 3 visits:  1). Independent with LE Ex x 10 reps     To be met in 6 visits:  1). Supine to/from sit: Min A   2). Sit to/from stand: Mod A   3). Bed to chair: Mod A   4). Gait: Ambulate 10 ft.   with  Mod A  and use of LRAD (least restrictive assistive device)  5). Tolerate B LE exercises 3 sets of 10-15 reps    Plan   Continue with plan of care. Signature: Charmayne Graham, PT, DPT, OMT-C  #[id number]      If patient discharges from this facility prior to next visit, this note will serve as the Discharge Summary.

## 2021-06-18 NOTE — PROGRESS NOTES
Pharmacy Note  Warfarin Consult     Dx: Aortic Stenosis/ Aortic Valve replacement TAVR (Nov 2020)/ DVT (1986)     Goal INR range 2-3   Home Warfarin dose: 5mg daily    Date                 INR                  Warfarin   6/15                  3.9                  Vitamin K 10 mg  6/16                  3.89                Hold  6/17                  1.65                5 mg  6/18                  1.31                5 mg       Recommend Warfarin 5 mg tonight. Daily INR ordered. Rx will continue to manage therapy per consult order.     Mick Jacob, TheresaD, [facility], 6/18/2021 1:24 PM

## 2021-06-18 NOTE — PROGRESS NOTES
Continuous Infusions:   sodium chloride Stopped (06/15/21 1848)       PRN Meds:  sodium chloride flush, sodium chloride, ondansetron **OR** ondansetron, polyethylene glycol, acetaminophen **OR** acetaminophen, traMADol      Data:  CBC:   Recent Labs     06/15/21  0730 06/16/21  0545 06/17/21  0541 06/18/21  0543   WBC 9.1 9.2 8.2 8.0   HGB 9.5* 9.0* 9.0* 9.1*   HCT 27.8* 26.1* 26.5* 26.4*   MCV 95.0 93.9 95.1 93.8   PLT 56* 52* 56*  --      BMP:   Recent Labs     06/15/21  0730 06/16/21  0545 06/18/21  0543    136 134*   K 4.2 3.6 3.5    105 102   CO2 26 23 26   BUN 35* 27* 19   CREATININE 1.0 0.8 0.9     PT/INR:   Recent Labs     06/16/21  0545 06/17/21  0541 06/18/21  0543   PROTIME 45.8* 19.2* 15.2*   INR 3.89* 1.65* 1.31*       CULTURES    SARS-COV-2 - Rapid: Not detected      RADIOLOGY    CT ELBOW LEFT WO CONTRAST 6/15/2021   Final Result   Within the limitations of the exam, no acute bony abnormality is identified. There is a large elbow joint effusion which is nonspecific. Sterility cannot   be assessed on imaging and if there is concern for septic arthritis   aspiration is recommended. Superimposed mild degenerative changes to the   elbow joint along with some calcifications either within the joint or in the   joint capsule posteriorly. Nonspecific subcutaneous edema about the elbow. No focal fluid collection or   soft tissue gas identified. XR ELBOW LEFT (2 VIEWS) 6/15/2021     Final Result   No acute abnormality the left elbow.            Assessment/Plan:    Left elbow effusion and pain  LUE swelling   - Minimally elevated sed rate  - Had remote injury back in January but otherwise no acute injury  - ED had low concerns for septic joint at the time of their evaluation however pain and symptoms have evolved and patient is asplenic and does not have an elevated leukocytosis or fevers but may not mount a typical response  - Overlying skin changes concerning for cellulitis  - Significant pain to the joint line specifically as well as severe pain with any minimal active or passive range of motion which is very concerning for possible septic arthritis  - Pt does have elevated INR and low platelet counts, give vitamin K x1 to reverse INR, may need platelet transfusion if requiring OR  - ortho consult - await recommendations  - PT/OT: Need placement as pt does require a walker to safely get around his home  - IV Vanc/Unasyn D#2 dc iv abx  Likely hemarthrosis. Discussed with Ortho. Discontinue IV antibiotics. Switch to Augmentin day 3/5   for possible cellulitis. Discussed with Ortho PA yesterday, who was going to reevaluate yesterday. Venous doppler LUE to rule out DVT. She has a DVT is very low as he was already on Coumadin at therapeutic levels.   He has persistent swelling in the part of the forearm and elbow region as he is unable to lift his left upper extremity because of his chronic shoulder pain.       Supra-therapeutic INR  - elevated INR 3.90 > 3.89  - No acute bleeding concerns  - Vitamin K 10 subcu x1 with concerns for possible need for procedure  Coumadin restarted -we will avoid bridging with Lovenox as platelet levels are low.       Atrial fibrillation  Aortic valve stenosis  Status post TAVR  - AC on Coumadin  - Supratherapeutic INR, see above--> worsening INR, may require heparin drip  - s/p pacemaker     Thrombocytopenia  - Plt 56 > 52  - Has had similar issues in the past  - Was discontinued off his aspirin  - No acute bleeding  - Monitor platelets closely, may require platelet transfusion if needs to go to the OR for the left elbow    Normocytic Anemia  - Hgb 9.5 on arrival  - previously 8.7 on 5/3/2021  - 9 today      HTN  - uncontrolled - with SBP in 140s  - Continue home lisinipril     Hx of Hairy Cell Leukemia   - Follows with oncology     S/p splenectomy  - 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

## 2021-06-18 NOTE — PROGRESS NOTES
Occupational Therapy Daily Treatment Note    Unit: [facility]  Date:  6/18/2021  Patient Name:    Harpreet Jara  Admitting diagnosis:  Unable to ambulate [R26.2]  Admit Date:  6/15/2021  Precautions/Restrictions:  fall risk, IV and bed/chair alarm    6/17: Received medical clearance from CHRISTINA Dove PA-C to complete manual lymphatic drainage and compression on L UE. Discharge Recommendations: SNF  DME needs for discharge: defer to facility       Therapy recommendations for staff:   Assist of 2 (minimal assist) with use of rolling walker (RW) for all transfers to/from BSC/chair    AM-PAC Score: AM-PAC Inpatient Daily Activity Raw Score: [place] 106 S4 Level: NA     Harpreet Jara scored a 10/24 on the AM-PAC ADL Inpatient form. Current research shows that an AM-PAC score of 17 or less is typically not associated with a discharge to the patient's home setting. Based on the patient's AM-PAC score and their current ADL deficits, it is recommended that the patient have 3-5 sessions per week of Occupational Therapy at d/c to increase the patient's independence. Please see assessment section for further patient specific details. If patient discharges prior to next session this note will serve as a discharge summary. Please see below for the latest assessment towards goals. Treatment Time:  1256-2915  Treatment number:  3  Total Treatment Time:   39  minutes    History of Present Illness: The patient is a 80 y. o. male with aortic valve stenosis, hypertension, arthritis, vertigo and remote history of DVT who presents the emergency department today with complaints of arm pain.  Patient states that he fell back in January and had pretty significant injury to the left arm.  Since that time he has had intermittent pain and swelling usually comes on throughout the day but goes away in the evenings. [facility] states he woke up this morning and had significant pain and swelling to left arm to the point where he could not move the arm at all without severe pain and calling out from pain.  He denies any new injury or trauma.  He denies any fevers or chills.  He states that he is never had anything like this in the past.  Reports that the symptoms are always in the same location in the left elbow and has not had it in any other location. Geri Singer reports having Ultram to take at home to help with pain as needed but typically does not need it but even this was not helping.  He does live at home with his wife who is in a wheelchair and reports that it is hard for him to take care of himself at his baseline and showering and other ADLs are difficult.  He typically uses a walker to get around. Subjective:  Patient supine in bed and agreeable to treatment. Pt noted to have improvements in LUE swelling this date. Minimal swelling in hand and wrist; Moderate/severe swelling in elbow. Pt provided with compression sleeve for edema management. Pain   Yes  Rating: moderate  Location: L elbow with movement  Pain Medicine Status: Denies need      Bed Mobility:   Supine to Sit:  Mod A  Sit to Supine:  Not Tested  Rolling:           Not Tested  Scooting: Mod A    Transfer Training:   Sit to stand: Mod A and + Min A x1; Assistance for placement of LUE  Stand to sit:  Mod A  Bed to Chair:  Min A, Mod A, with use of RW and x2 assist; 3 ft this date; shuffling gait    Bed to [facility]:   Not Tested  Standard toilet:   Not Tested    Activity Tolerance   Pt completed therapy session with No adverse symptoms noted w/activity    Vitals:    06/18/21   BP: (!) 140/73   Pulse: 68   Resp: 18   Temp: 97.5 °F (36.4 °C)   SpO2:          ADL Training:   Upper body dressing:   Total A  and donning compression sleeve  Upper body bathing:  Not Tested  Lower body dressing:  Not Tested  Lower body bathing:  Not Tested  Toileting:   Not Tested  Grooming/Hygiene:  Not Tested    Therapeutic Exercise: completed exercises with L UE to decrease

## 2021-06-18 NOTE — PROGRESS NOTES
working with PT/OT last 2 days and would continue that. Have recommended f/u with elbow/hand surgeon after d/c and he wishes to stay in the [address] [place] [address] system if possible. I will provide him with our info as well should he wish to f/u with Dr Precious Cobos or our group. Discussed with Dr Precious Cobos today as well.   2:  Continue Deep venous thrombosis prophylaxis  3:  Continue Pain Control    Lawence Standard, PA

## 2021-06-18 NOTE — DISCHARGE INSTR - COC
Continuity of Care Form    Patient Name: Michael Moore   :  1931  MRN:  [id number]    Admit date:  6/15/2021  Discharge date:  21    Code Status Order: DNR-CCA   Advance Directives:   Advance Care Flowsheet Documentation       Date/Time Healthcare Directive Type of Healthcare Directive Copy in [address] Agent's Name Healthcare Agent's Phone Number    06/15/21 1555  Yes, patient has an advance directive for healthcare treatment  Health care treatment directive; Living will;Durable power of  for health care  No, copy requested from family  Healthcare power of   --  --    06/15/21 1530  --  --  --  --  --  [phone]    06/15/21 1529  Yes, patient has an advance directive for healthcare treatment  Durable power of  for health care;Living will  No, copy requested from family  Healthcare power of   Fantasma De Oliveira  --            Admitting Physician:  Allen Lugo MD  PCP: Ruel Mallory MD    Discharging Nurse: [facility] Unit/Room#: 0221/0221-02  Discharging Unit Phone Number: [phone]    Emergency Contact:   Extended Emergency Contact Information  Primary Emergency Contact: Sarah Ndiaye  Address: [facility] [address] [place] [address]  Mobile Phone: [phone]  Relation: Spouse    Past Surgical History:  Past Surgical History:   Procedure Laterality Date    INTRACAPSULAR CATARACT EXTRACTION Left 3/27/2019    PHACO EMULSIFICATION OF CATARACT WITH INTRAOCULAR LENS IMPLANT EYE performed by Radha Tabor MD at [place] [address] Right 4/3/2019    PHACO EMULSIFICATION OF CATARACT WITH INTRAOCULAR LENS IMPLANT EYE performed by Radha Tabor MD at [address] [place] [address]      2017    SHOULDER SURGERY      SPLENECTOMY         Immunization History: There is no immunization history on file for this patient.     Active Problems:  Patient Active Problem List   Diagnosis Code    Unable to ambulate R26.2    Elbow effusion, left M25.422    Supratherapeutic INR R79.1    Atrial fibrillation (HCC) I48.91    S/P TAVR (transcatheter aortic valve replacement) Z95.2    Thrombocytopenia (HCC) D69.6    Essential hypertension I10    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    [facility] C91.41       Isolation/Infection:   Isolation            No Isolation          Patient Infection Status       None to display            Nurse Assessment:  Last Vital Signs: /73   Pulse 73   Temp 97.4 °F (36.3 °C) (Oral)   Resp 18   Ht 5' 9\" (1.753 m)   Wt 175 lb (79.4 kg)   SpO2 96%   BMI 25.84 kg/m²     Last documented pain score (0-10 scale): Pain Level: 7  Last Weight:   Wt Readings from Last 1 Encounters:   06/15/21 175 lb (79.4 kg)     Mental Status:  oriented and alert    IV Access:  - None    Nursing Mobility/ADLs:  Walking   Assisted  Transfer  Assisted  Bathing  Assisted  Dressing  Assisted  Toileting  Assisted  Feeding  Independent  Med Admin  Dependent  Med Delivery   whole    Wound Care Documentation and Therapy:        Elimination:  Continence:   · Bowel: Yes  · Bladder: Yes  Urinary Catheter: None   Colostomy/Ileostomy/Ileal Conduit: No       Intake/Output Summary (Last 24 hours) at 6/18/2021 1518  Last data filed at 6/18/2021 1302  Gross per 24 hour   Intake 240 ml   Output 2425 ml   Net -2185 ml     I/O last 3 completed shifts: In: 240 [P.O.:240]  Out: 2425 [Urine:2425]    Safety Concerns: At Risk for Falls    Impairments/Disabilities:      None    Nutrition Therapy:  Current Nutrition Therapy:   - Oral Diet:  General    Routes of Feeding: Oral  Liquids: Thin Liquids  Daily Fluid Restriction: no  Last Modified Barium Swallow with Video (Video Swallowing Test): not done    Treatments at the Time of Hospital Discharge:   Respiratory Treatments: none  Oxygen Therapy:  is not on home oxygen therapy.   Ventilator: - No ventilator support    Rehab Therapies: Physical Therapy and Occupational Therapy  Weight Bearing Status/Restrictions: No weight bearing restirctions  Other Medical Equipment (for information only, NOT a DME order):  hospital bed  Other Treatments: none    Patient's personal belongings (please select all that are sent with patient):  all belongings sent to SNF with patient     RN SIGNATURE:  Electronically signed by Paulette Neal RN on 6/18/21 at 5:32 PM EDT    CASE MANAGEMENT/SOCIAL WORK SECTION    Inpatient Status Date: 6/15/21    Readmission Risk Assessment Score:  Readmission Risk              Risk of Unplanned Readmission:  15           Discharging to Facility/ Agency   · Name: [facility]  · Phone: [phone]    / signature: Electronically signed by Michelet Ruiz RN on 6/18/21 at 3:46 PM EDT    PHYSICIAN SECTION    Prognosis: Good    Condition at Discharge: Stable    Rehab Potential (if transferring to Rehab): Good    Recommended Labs or Other Treatments After Discharge: none    Physician Certification: I certify the above information and transfer of Faiza Allen  is necessary for the continuing treatment of the diagnosis listed and that he requires [facility] for less 30 days.      Update Admission H&P: No change in H&P    PHYSICIAN SIGNATURE:  Electronically signed by Ravinder Lau MD on 6/18/21 at 3:19 PM EDT

## 2021-06-18 NOTE — PROGRESS NOTES
mirta discharged to [facility] at this time left via stretcher in stable condition   4 Eyes Skin Assessment     The patient is being assess for   transferred to SNF    I agree that 2 RN's have performed a thorough Head to Toe Skin Assessment on the patient. ALL assessment sites listed below have been assessed. Areas assessed for pressure by both nurses:   [x]   Head, Face, and Ears   [x]   Shoulders, Back, and Chest, Abdomen  [x]   Arms, Elbows, and Hands   [x]   Coccyx, Sacrum, and Ischium  [x]   Legs, Feet, and Heels   Scattered bruising to BUE, BLE      Skin Assessed Under all Medical Devices by both nurses:  no devices noted               All Mepilex Borders were peeled back and area peeked at by both nurses:  No: none noted   Please list where Mepilex Borders are located:  none noted             **SHARE this note so that the co-signing nurse is able to place an eSignature**    Co-signer eSignature: Electronically signed by Yina Fontaine RN on 6/18/21 at 6:01 PM EDT    Does the Patient have Skin Breakdown related to pressure?   No       Robby Prevention initiated:  No   Wound Care Orders initiated:  No      [facility] nurse consulted for Pressure Injury (Stage 3,4, Unstageable, DTI, NWPT, Complex wounds)and New or Established Ostomies:  NA      Primary Nurse eSignature: Electronically signed by Gail Waters RN on 6/18/21 at 5:47 PM EDT

## 2021-06-18 NOTE — PLAN OF CARE
Problem: Falls - Risk of:  Goal: Will remain free from falls  Description: Will remain free from falls  6/17/2021 2258 by Ana Arizmendi RN  Outcome: Ongoing  6/17/2021 1043 by Heather Lam RN  Outcome: Ongoing  Goal: Absence of physical injury  Description: Absence of physical injury  6/17/2021 2258 by Ana Arizmendi RN  Outcome: Ongoing  6/17/2021 1043 by Heather Lam RN  Outcome: Ongoing     Problem: Skin Integrity:  Goal: Will show no infection signs and symptoms  Description: Will show no infection signs and symptoms  6/17/2021 2258 by Ana Arizmendi RN  Outcome: Ongoing  6/17/2021 1043 by Heather Lam RN  Outcome: Ongoing  Goal: Absence of new skin breakdown  Description: Absence of new skin breakdown  6/17/2021 2258 by Ana Arizmendi RN  Outcome: Ongoing  6/17/2021 1043 by Heather Lam RN  Outcome: Ongoing     Problem: Pain:  Goal: Pain level will decrease  Description: Pain level will decrease  Outcome: Ongoing  Goal: Control of acute pain  Description: Control of acute pain  Outcome: Ongoing  Goal: Control of chronic pain  Description: Control of chronic pain  Outcome: Ongoing

## 2021-06-18 NOTE — PLAN OF CARE
Problem: Falls - Risk of:  Goal: Will remain free from falls  Description: Will remain free from falls  6/18/2021 1526 by Mark Stokes RN  Outcome: Completed     Problem: Falls - Risk of:  Goal: Absence of physical injury  Description: Absence of physical injury  6/18/2021 1526 by Mark Stokes RN  Outcome: Completed     Problem: Skin Integrity:  Goal: Will show no infection signs and symptoms  Description: Will show no infection signs and symptoms  6/18/2021 1526 by Mark Stokes RN  Outcome: Completed     Problem: Skin Integrity:  Goal: Absence of new skin breakdown  Description: Absence of new skin breakdown  6/18/2021 1526 by Mark Stokes RN  Outcome: Completed     Problem: Pain:  Goal: Pain level will decrease  Description: Pain level will decrease  6/18/2021 1526 by Mark Stokes RN  Outcome: Completed     Problem: Pain:  Goal: Control of acute pain  Description: Control of acute pain  6/18/2021 1526 by Mark Stokes RN  Outcome: Completed     Problem: Pain:  Goal: Control of chronic pain  Description: Control of chronic pain  6/18/2021 1526 by Mark Stokes RN  Outcome: Completed

## 2021-06-18 NOTE — CARE COORDINATION
INTERDISCIPLINARY PLAN OF CARE CONFERENCE    Date/Time: 6/18/2021 10:13 AM  Completed by: Edwin Sanchez RN, Case Management      Patient Name:  Annie Velásquez  YOB: 1931  Admitting Diagnosis: Unable to ambulate [R26.2]     Admit Date/Time:  6/15/2021  7:07 AM    Chart reviewed. Interdisciplinary team contacted or reviewed plan related to patient progress and discharge plans. Disciplines included Case Management, Nursing, and Dietitian. Current Status: Stable  PT/OT recommendation for discharge plan of care: SNF    Expected D/C Disposition:  Rehab  Confirmed plan with patient and/or family Yes confirmed with: (name) wife  Met with: patient  Discharge Plan Comments: Reviewed chart and spoke with pt and wife via phone. Both cont plan for STR at [facility]. Spoke with Candy at [facility] who states can accept and started pre cert on 8/49. Candy will call and confirm receipt of clinical info today. [facility] has fax confirmation. Will cont to follow. Noted received VM from Candy at [facility] states pre cert completed and can accept pt today. No other dc needs identified. Home O2 in place on admit: No  Pt informed of need to bring portable home O2 tank on day of discharge for nursing to connect prior to leaving:  Not Indicated  Verbalized agreement/Understanding:  Not Indicated                                                                                                        Confirmed discharge plan with:              Patient:  Yes              When pt confirms DC plan does any support person need to be contacted by CM Yes if yes who wife                      Discharge to Facility:  [facility] phone number for staff giving report: [phone]  Pre-certification completed: Yes   Hospital Exemption Notification (HENS) completed: Yes   Discharge orders and Continuity of Care faxed to facility:  Yes      Transportation:               Medical Transport explained with

## 2021-06-21 NOTE — DISCHARGE SUMMARY
severe pain with any minimal active or passive range of motion which is concerning for possible septic arthritis  - Pt does have elevated INR and low platelet counts, give vitamin K x1 to reverse INR, may need platelet transfusion if requiring OR  - ortho consult - likely hemarthrosis and low concerns for septic joint  - PT/OT: Need placement as pt does require a walker to safely get around his home  - IV Vanc/Unasyn D#2 dc iv abx--> Switch to Augmentin day 3/5 for possible cellulitis. - Venous doppler LUE to rule out DVT: no evidence of DVT or superficial VT      Supra-therapeutic INR  - elevated INR 3.90 > 3.89  - No acute bleeding concerns  - Vitamin K 10 subcu x1 with concerns for possible need for procedure  - Coumadin restarted -we will avoid bridging with Lovenox as platelet levels are low.       Atrial fibrillation  Aortic valve stenosis  Status post TAVR  - AC on Coumadin  - Supratherapeutic INR, see above--> worsening INR, may require heparin drip  - s/p pacemaker     Thrombocytopenia  - Plt 56 > 52  - Has had similar issues in the past  - Was discontinued off his aspirin     Normocytic Anemia  - Hgb 9.5 on arrival  - previously 8.7 on 5/3/2021  - 9 today      HTN  - uncontrolled - with SBP in 140s  - Continue home lisinipril     Hx of Hairy Cell Leukemia   - Follows with oncology     S/p splenectomy  - in 1978, reports he is unsure why this occurred     Procedures (Please Review Full Report for Details)  None    Consults    Orthopedics       Physical Exam at Discharge:    /73   Pulse 73   Temp 97.4 °F (36.3 °C) (Oral)   Resp 18   Ht 5' 9\" (1.753 m)   Wt 175 lb (79.4 kg)   SpO2 96%   BMI 25.84 kg/m²     Gen: Elderly male, no distress. Alert. Sitting up in his chair  Eyes: No conjunctival injection. ENT: No discharge. Pharynx clear. Neck:  Trachea midline. Resp: No accessory muscle use. No crackles. No wheezes. No rhonchi.  On Ra  CV: Regular rate.  Irregular rhythm. + murmur. Bilateral lower extremity edema.   Capillary Refill: Brisk,< 3 seconds   Peripheral Pulses: +1 palpable, equal bilaterally   GI: Non-tender. Non-distended. Normal bowel sounds. Lesly-umbilical hernia, soft, easily reducible   Skin: Warm and dry. Bilateral lower extremities swelling and scaling with chronic skin changes   M/S: + Significant edema to the left upper extremity with large joint effusion to the left elbow with severe pain with either passive or active movement and tenderness at the joint line only, no bony tenderness to palpation, good range of motion of the left wrist and digits, all compartments are soft  Neuro: Awake. Grossly non-focal, moves all extremities, no dysarthria, follows commands    Psych: Oriented x 3. No anxiety or agitation. CULTURES  COVID-19: not detected     RADIOLOGY  VL Extremity Venous Left   Final Result      CT ELBOW LEFT WO CONTRAST   Final Result   Within the limitations of the exam, no acute bony abnormality is identified. There is a large elbow joint effusion which is nonspecific. Sterility cannot   be assessed on imaging and if there is concern for septic arthritis   aspiration is recommended. Superimposed mild degenerative changes to the   elbow joint along with some calcifications either within the joint or in the   joint capsule posteriorly. Nonspecific subcutaneous edema about the elbow. No focal fluid collection or   soft tissue gas identified. XR ELBOW LEFT (2 VIEWS)   Final Result   No acute abnormality the left elbow.              Discharge Medications     Medication List      START taking these medications    amoxicillin-clavulanate 500-125 MG per tablet  Commonly known as: AUGMENTIN  Take 1 tablet by mouth every 12 hours for 4 days        CONTINUE taking these medications    atorvastatin 10 MG tablet  Commonly known as: LIPITOR     finasteride 5 MG tablet  Commonly known as: PROSCAR     lisinopril 40 MG tablet  Commonly known as: PRINIVIL;ZESTRIL psyllium 28.3 % Pack  Commonly known as: KONSYL     tamsulosin 0.4 MG capsule  Commonly known as: FLOMAX     therapeutic multivitamin-minerals tablet     warfarin 5 MG tablet  Commonly known as: COUMADIN        STOP taking these medications    traMADol 50 MG tablet  Commonly known as: ULTRAM        ASK your doctor about these medications    traMADol 50 MG tablet  Commonly known as: ULTRAM  Take 1 tablet by mouth every 6 hours as needed for Pain for up to 2 days. Ask about: Should I take this medication? Where to Get Your Medications      You can get these medications from any pharmacy    Bring a paper prescription for each of these medications  · traMADol 50 MG tablet     Information about where to get these medications is not yet available    Ask your nurse or doctor about these medications  · amoxicillin-clavulanate 500-125 MG per tablet       Discharged in stable condition to SNF    Follow Up: Follow up with PCP, aracely Salcedo M.D.

## 2021-09-01 NOTE — PROGRESS NOTES
Inpatient Occupational Therapy  Evaluation and Treatment    Unit: 2 [place]  Date:  6/16/2021  Patient Name:    Shyla Roberts  Admitting diagnosis:  Unable to ambulate [R26.2]  Admit Date:  6/15/2021  Precautions/Restrictions/WB Status/ Lines/ Wounds/ Oxygen: fall risk and bed/chair alarm    Treatment Time:  9:07-9:56 AM   Treatment Number: 1      Billable Treatment Time: 49 mins  Total Treatment Time:  39 mins; 10 min evaluation     Patient Goals for Therapy:  \" To get back home \"      Discharge Recommendations: SNF  DME needs for discharge: defer to facility       Therapy recommendations for staff:   Assist of 2 with use of rolling walker (RW) for all  STAND PIVOT TRANSFERS transfers to/from BSC/chair  to/from bedside commode  to/from chair    History of Present Illness: The patient is a 80 y. o. male with aortic valve stenosis, hypertension, arthritis, vertigo and remote history of DVT who presents the emergency department today with complaints of arm pain.  Patient states that he fell back in January and had pretty significant injury to the left arm.  Since that time he has had intermittent pain and swelling usually comes on throughout the day but goes away in the evenings. [facility] states he woke up this morning and had significant pain and swelling to left arm to the point where he could not move the arm at all without severe pain and calling out from pain.  He denies any new injury or trauma.  He denies any fevers or chills.  He states that he is never had anything like this in the past.  Reports that the symptoms are always in the same location in the left elbow and has not had it in any other location. [facility] reports having Ultram to take at home to help with pain as needed but typically does not need it but even this was not helping.  He does live at home with his wife who is in a wheelchair and reports that it is hard for him to take care of himself at his baseline and showering and other ADLs are difficult.  He typically uses a walker to get around. Home Health S4 Level Recommendation:  NA  AM-PAC Score: AM-PAC Inpatient Daily Activity Raw Score: 10    Preadmission Environment    Pt. Lives with spouse and wife is in a powerchair for [employer] [address] environment:  one story home  Steps to enter first floor:   ramp    Steps to second floor: N/A  Bathroom:  Walk-in Shower, Grab bars, Shower Chair  and hand held shower head  Equipment owned:  Rollator , lift chair and life alert     Preadmission Status / PLOF:  History of falls   Yes-1 fall in    Pt. Able to drive   No  Pt Fully independent with ADL's  No-  Wife or transportation   Pt. Required assistance from family for:  Bathing, Cleaning and Cooking- Pt reports wife assists with bathing, cooking   Pt. Fully independent for transfers and gait and walked with: Rollator; Power w/c in house with exception of rollator into bathroom. Pain  Yes  Ratin at rest; 8-9 with movement   Location:L elbow  Pain Medicine Status: Denies need      Cognition    A&O x4   Able to follow 2 step commands; Pt very particular with movements and benefits from instructions     Subjective  Patient on commode upon arrival  - no visitors present   Pt agreeable to this OT eval & tx.      Upper Extremity ROM:    Impaired R ~50 degree of shoulder flex  Impaired L ~30 degrees of shoulder flexion; Elbow AROM limited due to edema and pain    Upper Extremity Strength:    BUE strength impaired but not formally assessed w/ MMT    R UE   Shoulder flexion  Weak limited due to decreased AROM from athritis       L UE  Shoulder flexion  Weak _ Not formally tested due to pain in L elbow; AROM limited in shoulder flexion due to arthritis       Upper Extremity Sensation    WFL    Upper Extremity Proprioception:  [facility]    Coordination and Tone  Impaired and Diminished    Balance  Functional Sitting Balance:  Impaired and rounded shoulder; kyphotic  Functional Standing Balance:Impaired and Diminished -flexed knees; Anticipate pt does not stand completely upright at baseline  Bed mobility:    Supine to sit:   Not Tested and pt up on commode upon arrival  Sit to supine:   Not Tested  Rolling:    Assess at next visit as able  Scooting in sitting:  Assess at next visit as able  Scooting to head of bed:   Assess at next visit as able    Bridging:   Assess at next visit as able    Transfers:    Sit to stand: Mod A of 2- varying levels of tressa; min-mod A x2    Stand to sit:  Mod A of 2  Bed to chair:   BSC to chiar- Mod A x2 stand pivot with RW; Pt with flexed posture and flexed knees       Standard toilet: Not Tested  Bed to [facility]:  Mod A of 2    Dressing:      UE: Mod A  LE:    Max A and pt able to assist with managing brief once in stance; Mod A to balance in standing     Bathing:    UE:  SBA  LE:  Not Tested    Eating:   Not Tested    Toileting: Mod A of 2 and Pt able to complete hygiene in stance; but requiring mod A x2 from OT/PT for balance in stance; pt very persistant with BM hygiene     Activity Tolerance   Pt completed therapy session with No adverse symptoms noted w/activity  Pain noted with any active movement of LUE      Positioning Needs:   Up in chair, call light and needs in reach. Alarm Set  Discussed w/ RN, pt approved to sit upright in recliner chair    Exercise / Activities Initiated:   N/A    Patient/Family Education:   Role of OT  Recommendations for DC  Safe RW use/hand placement    Assessment of Deficits: Pt seen for Occupational therapy evaluation in acute care setting. Pt demonstrated decreased Activity tolerance, ADLs, IADLs, Balance , Bathing, Bed mobility, Dressing, ROM, Strength and Transfers. Pt functioning below baseline and will likely benefit from skilled occupational therapy services to maximize safety and independence. Goal(s) : To be met in 3 Visits:  1). Bed to toilet/BSC: Mod A    To be met in 5 Visits:  1). Supine to/from Sit:  Mod A  2). Upper Body Bathing:   Supervision  3). Lower Body Bathing: Mod A  4). Upper Body Dressing:  Min A  5). Lower Body Dressing: Mod A  6). Pt to demonstrate UE exs x 15 reps with minimal cues    Rehabilitation Potential:  Fair for goals listed above. Strengths for achieving goals include: Pt motivated and Pt cooperative  Barriers to achieving goals include:  Complexity of condition, Pain and Weakness     Plan: To be seen 3-5 x/wk while in acute care setting for therapeutic exercises, bed mobility, transfers, dressing, bathing, family/patient education, ADL/IADL retraining, energy conservation training.                If patient discharges from this facility prior to next visit, this note will serve as the Discharge Summary 3

## 2022-05-20 ENCOUNTER — APPOINTMENT (OUTPATIENT)
Dept: CT IMAGING | Age: 87
End: 2022-05-20
Payer: MEDICARE

## 2022-05-20 ENCOUNTER — APPOINTMENT (OUTPATIENT)
Dept: GENERAL RADIOLOGY | Age: 87
End: 2022-05-20
Payer: MEDICARE

## 2022-05-20 ENCOUNTER — HOSPITAL ENCOUNTER (OUTPATIENT)
Age: 87
Setting detail: OBSERVATION
Discharge: NURSING, REHAB OR LONG-TERM CARE | End: 2022-05-27
Attending: EMERGENCY MEDICINE | Admitting: INTERNAL MEDICINE
Payer: MEDICARE

## 2022-05-20 DIAGNOSIS — S49.91XA INJURY OF RIGHT SHOULDER, INITIAL ENCOUNTER: ICD-10-CM

## 2022-05-20 DIAGNOSIS — S51.011A SKIN TEAR OF RIGHT ELBOW WITHOUT COMPLICATION, INITIAL ENCOUNTER: ICD-10-CM

## 2022-05-20 DIAGNOSIS — R91.8 PULMONARY INFILTRATES: ICD-10-CM

## 2022-05-20 DIAGNOSIS — S09.90XA CLOSED HEAD INJURY, INITIAL ENCOUNTER: Primary | ICD-10-CM

## 2022-05-20 DIAGNOSIS — D69.6 THROMBOCYTOPENIA (HCC): ICD-10-CM

## 2022-05-20 DIAGNOSIS — S59.901A ELBOW INJURY, RIGHT, INITIAL ENCOUNTER: ICD-10-CM

## 2022-05-20 DIAGNOSIS — Z79.01 ANTICOAGULATED: ICD-10-CM
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
## 2022-05-20 DIAGNOSIS — D64.9 CHRONIC ANEMIA: ICD-10-CM

## 2022-05-20 LAB
A/G RATIO: 1.2 (ref 1.1–2.2)
ALBUMIN SERPL-MCNC: 3.6 G/DL (ref 3.4–5)
ALP BLD-CCNC: 80 U/L (ref 40–129)
ALT SERPL-CCNC: 15 U/L (ref 10–40)
ANION GAP SERPL CALCULATED.3IONS-SCNC: 8 MMOL/L (ref 3–16)
ANISOCYTOSIS: ABNORMAL
AST SERPL-CCNC: 18 U/L (ref 15–37)
BASOPHILS ABSOLUTE: 0 K/UL (ref 0–0.2)
BASOPHILS RELATIVE PERCENT: 0.5 %
BILIRUB SERPL-MCNC: 0.9 MG/DL (ref 0–1)
BUN BLDV-MCNC: 24 MG/DL (ref 7–20)
CALCIUM SERPL-MCNC: 8.8 MG/DL (ref 8.3–10.6)
CHLORIDE BLD-SCNC: 104 MMOL/L (ref 99–110)
CO2: 27 MMOL/L (ref 21–32)
CREAT SERPL-MCNC: 1 MG/DL (ref 0.8–1.3)
EKG ATRIAL RATE: 50 BPM
EKG DIAGNOSIS: NORMAL
EKG Q-T INTERVAL: 418 MS
EKG QRS DURATION: 144 MS
EKG QTC CALCULATION (BAZETT): 466 MS
EKG R AXIS: 80 DEGREES
EKG T AXIS: -81 DEGREES
EKG VENTRICULAR RATE: 75 BPM
EOSINOPHILS ABSOLUTE: 0 K/UL (ref 0–0.6)
EOSINOPHILS RELATIVE PERCENT: 0.1 %
GFR AFRICAN AMERICAN: >60
GFR NON-AFRICAN AMERICAN: >60
GLUCOSE BLD-MCNC: 99 MG/DL (ref 70–99)
HCT VFR BLD CALC: 30.4 % (ref 40.5–52.5)
HEMOGLOBIN: 10.6 G/DL (ref 13.5–17.5)
INR BLD: 2.99 (ref 0.88–1.12)
LYMPHOCYTES ABSOLUTE: 2.8 K/UL (ref 1–5.1)
LYMPHOCYTES RELATIVE PERCENT: 38 %
MCH RBC QN AUTO: 32.7 PG (ref 26–34)
MCHC RBC AUTO-ENTMCNC: 34.7 G/DL (ref 31–36)
MCV RBC AUTO: 94.2 FL (ref 80–100)
MONOCYTES ABSOLUTE: 0.7 K/UL (ref 0–1.3)
MONOCYTES RELATIVE PERCENT: 8.8 %
NEUTROPHILS ABSOLUTE: 3.9 K/UL (ref 1.7–7.7)
NEUTROPHILS RELATIVE PERCENT: 52.6 %
PDW BLD-RTO: 15.5 % (ref 12.4–15.4)
PLATELET # BLD: ABNORMAL K/UL (ref 135–450)
PLATELET SLIDE REVIEW: ABNORMAL
PMV BLD AUTO: 14.7 FL (ref 5–10.5)
POTASSIUM REFLEX MAGNESIUM: 4 MMOL/L (ref 3.5–5.1)
PROTHROMBIN TIME: 35.4 SEC (ref 9.9–12.7)
RBC # BLD: 3.23 M/UL (ref 4.2–5.9)
SARS-COV-2, NAAT: NOT DETECTED
SLIDE REVIEW: ABNORMAL
SODIUM BLD-SCNC: 139 MMOL/L (ref 136–145)
TOTAL PROTEIN: 6.6 G/DL (ref 6.4–8.2)
TROPONIN: 0.02 NG/ML
WBC # BLD: 7.4 K/UL (ref 4–11)

## 2022-05-20 PROCEDURE — G0378 HOSPITAL OBSERVATION PER HR: HCPCS

## 2022-05-20 PROCEDURE — 6370000000 HC RX 637 (ALT 250 FOR IP)

## 2022-05-20 PROCEDURE — 90471 IMMUNIZATION ADMIN: CPT | Performed by: EMERGENCY MEDICINE

## 2022-05-20 PROCEDURE — 87635 SARS-COV-2 COVID-19 AMP PRB: CPT

## 2022-05-20 PROCEDURE — 90715 TDAP VACCINE 7 YRS/> IM: CPT | Performed by: EMERGENCY MEDICINE

## 2022-05-20 PROCEDURE — 36415 COLL VENOUS BLD VENIPUNCTURE: CPT

## 2022-05-20 PROCEDURE — 2580000003 HC RX 258: Performed by: EMERGENCY MEDICINE

## 2022-05-20 PROCEDURE — 6370000000 HC RX 637 (ALT 250 FOR IP): Performed by: NURSE PRACTITIONER

## 2022-05-20 PROCEDURE — 6360000002 HC RX W HCPCS: Performed by: NURSE PRACTITIONER

## 2022-05-20 PROCEDURE — 71250 CT THORAX DX C-: CPT

## 2022-05-20 PROCEDURE — 99219 PR INITIAL OBSERVATION CARE/DAY 50 MINUTES: CPT

## 2022-05-20 PROCEDURE — 93005 ELECTROCARDIOGRAM TRACING: CPT | Performed by: EMERGENCY MEDICINE

## 2022-05-20 PROCEDURE — 92610 EVALUATE SWALLOWING FUNCTION: CPT

## 2022-05-20 PROCEDURE — 6360000002 HC RX W HCPCS: Performed by: EMERGENCY MEDICINE

## 2022-05-20 PROCEDURE — 72125 CT NECK SPINE W/O DYE: CPT

## 2022-05-20 PROCEDURE — 70450 CT HEAD/BRAIN W/O DYE: CPT

## 2022-05-20 PROCEDURE — 96365 THER/PROPH/DIAG IV INF INIT: CPT

## 2022-05-20 PROCEDURE — 73030 X-RAY EXAM OF SHOULDER: CPT

## 2022-05-20 PROCEDURE — 2580000003 HC RX 258: Performed by: NURSE PRACTITIONER

## 2022-05-20 PROCEDURE — 93010 ELECTROCARDIOGRAM REPORT: CPT | Performed by: INTERNAL MEDICINE

## 2022-05-20 PROCEDURE — 71045 X-RAY EXAM CHEST 1 VIEW: CPT

## 2022-05-20 PROCEDURE — 96372 THER/PROPH/DIAG INJ SC/IM: CPT

## 2022-05-20 PROCEDURE — 96361 HYDRATE IV INFUSION ADD-ON: CPT

## 2022-05-20 PROCEDURE — 96366 THER/PROPH/DIAG IV INF ADDON: CPT

## 2022-05-20 PROCEDURE — 73560 X-RAY EXAM OF KNEE 1 OR 2: CPT

## 2022-05-20 PROCEDURE — 99285 EMERGENCY DEPT VISIT HI MDM: CPT

## 2022-05-20 PROCEDURE — 92526 ORAL FUNCTION THERAPY: CPT

## 2022-05-20 PROCEDURE — 80053 COMPREHEN METABOLIC PANEL: CPT

## 2022-05-20 PROCEDURE — 73080 X-RAY EXAM OF ELBOW: CPT

## 2022-05-20 PROCEDURE — 84484 ASSAY OF TROPONIN QUANT: CPT

## 2022-05-20 PROCEDURE — 85025 COMPLETE CBC W/AUTO DIFF WBC: CPT

## 2022-05-20 PROCEDURE — 85610 PROTHROMBIN TIME: CPT

## 2022-05-20 RX ORDER — ONDANSETRON 4 MG/1
4 TABLET, ORALLY DISINTEGRATING ORAL EVERY 8 HOURS PRN
Status: DISCONTINUED | OUTPATIENT
Start: 2022-05-20 | End: 2022-05-27 | Stop reason: HOSPADM

## 2022-05-20 RX ORDER — POLYETHYLENE GLYCOL 3350 17 G/17G
17 POWDER, FOR SOLUTION ORAL DAILY PRN
Status: DISCONTINUED | OUTPATIENT
Start: 2022-05-20 | End: 2022-05-20 | Stop reason: SDUPTHER

## 2022-05-20 RX ORDER — ACETAMINOPHEN 325 MG/1
650 TABLET ORAL EVERY 6 HOURS PRN
Status: DISCONTINUED | OUTPATIENT
Start: 2022-05-20 | End: 2022-05-27 | Stop reason: HOSPADM

## 2022-05-20 RX ORDER — ONDANSETRON 2 MG/ML
4 INJECTION INTRAMUSCULAR; INTRAVENOUS EVERY 6 HOURS PRN
Status: DISCONTINUED | OUTPATIENT
Start: 2022-05-20 | End: 2022-05-27 | Stop reason: HOSPADM

## 2022-05-20 RX ORDER — WARFARIN SODIUM 5 MG/1
2.5 TABLET ORAL
Status: COMPLETED | OUTPATIENT
Start: 2022-05-20 | End: 2022-05-20

## 2022-05-20 RX ORDER — POLYETHYLENE GLYCOL 3350 17 G/17G
17 POWDER, FOR SOLUTION ORAL DAILY PRN
Status: DISCONTINUED | OUTPATIENT
Start: 2022-05-20 | End: 2022-05-27 | Stop reason: HOSPADM

## 2022-05-20 RX ORDER — SODIUM CHLORIDE 0.9 % (FLUSH) 0.9 %
5-40 SYRINGE (ML) INJECTION PRN
Status: DISCONTINUED | OUTPATIENT
Start: 2022-05-20 | End: 2022-05-27 | Stop reason: HOSPADM

## 2022-05-20 RX ORDER — M-VIT,TX,IRON,MINS/CALC/FOLIC 27MG-0.4MG
1 TABLET ORAL DAILY
Status: DISCONTINUED | OUTPATIENT
Start: 2022-05-20 | End: 2022-05-27 | Stop reason: HOSPADM

## 2022-05-20 RX ORDER — ACETAMINOPHEN 650 MG/1
650 SUPPOSITORY RECTAL EVERY 6 HOURS PRN
Status: DISCONTINUED | OUTPATIENT
Start: 2022-05-20 | End: 2022-05-27 | Stop reason: HOSPADM

## 2022-05-20 RX ORDER — ACETAMINOPHEN 325 MG/1
650 TABLET ORAL ONCE
Status: COMPLETED | OUTPATIENT
Start: 2022-05-20 | End: 2022-05-20

## 2022-05-20 RX ORDER — SODIUM CHLORIDE 9 MG/ML
250 INJECTION, SOLUTION INTRAVENOUS PRN
Status: DISCONTINUED | OUTPATIENT
Start: 2022-05-20 | End: 2022-05-27 | Stop reason: HOSPADM

## 2022-05-20 RX ORDER — ACETAMINOPHEN 325 MG/1
TABLET ORAL
Status: COMPLETED
Start: 2022-05-20 | End: 2022-05-20

## 2022-05-20 RX ORDER — LISINOPRIL 20 MG/1
40 TABLET ORAL DAILY
Status: DISCONTINUED | OUTPATIENT
Start: 2022-05-20 | End: 2022-05-27 | Stop reason: HOSPADM

## 2022-05-20 RX ORDER — SODIUM CHLORIDE 0.9 % (FLUSH) 0.9 %
5-40 SYRINGE (ML) INJECTION EVERY 12 HOURS SCHEDULED
Status: DISCONTINUED | OUTPATIENT
Start: 2022-05-20 | End: 2022-05-27 | Stop reason: HOSPADM

## 2022-05-20 RX ORDER — FINASTERIDE 5 MG/1
5 TABLET, FILM COATED ORAL DAILY
Status: DISCONTINUED | OUTPATIENT
Start: 2022-05-20 | End: 2022-05-27 | Stop reason: HOSPADM

## 2022-05-20 RX ORDER — PSYLLIUM SEED (WITH DEXTROSE)
1 POWDER (GRAM) ORAL PRN
COMMUNITY

## 2022-05-20 RX ORDER — ATORVASTATIN CALCIUM 40 MG/1
40 TABLET, FILM COATED ORAL DAILY
Status: DISCONTINUED | OUTPATIENT
Start: 2022-05-20 | End: 2022-05-22 | Stop reason: SDUPTHER

## 2022-05-20 RX ADMIN — ACETAMINOPHEN 325 MG: 325 TABLET ORAL at 04:02

## 2022-05-20 RX ADMIN — SODIUM CHLORIDE 250 ML: 9 INJECTION, SOLUTION INTRAVENOUS at 20:59

## 2022-05-20 RX ADMIN — WARFARIN SODIUM 2.5 MG: 5 TABLET ORAL at 17:54

## 2022-05-20 RX ADMIN — SODIUM CHLORIDE, PRESERVATIVE FREE 10 ML: 5 INJECTION INTRAVENOUS at 20:59

## 2022-05-20 RX ADMIN — MULTIPLE VITAMINS W/ MINERALS TAB 1 TABLET: TAB at 15:02

## 2022-05-20 RX ADMIN — AMPICILLIN SODIUM AND SULBACTAM SODIUM 3000 MG: 2; 1 INJECTION, POWDER, FOR SOLUTION INTRAMUSCULAR; INTRAVENOUS at 08:04

## 2022-05-20 RX ADMIN — AMPICILLIN SODIUM AND SULBACTAM SODIUM 3000 MG: 2; 1 INJECTION, POWDER, FOR SOLUTION INTRAMUSCULAR; INTRAVENOUS at 21:02

## 2022-05-20 RX ADMIN — ATORVASTATIN CALCIUM 40 MG: 40 TABLET, FILM COATED ORAL at 15:02

## 2022-05-20 RX ADMIN — AMPICILLIN SODIUM AND SULBACTAM SODIUM 3000 MG: 2; 1 INJECTION, POWDER, FOR SOLUTION INTRAMUSCULAR; INTRAVENOUS at 14:17

## 2022-05-20 RX ADMIN — LISINOPRIL 40 MG: 20 TABLET ORAL at 15:02

## 2022-05-20 RX ADMIN — TETANUS TOXOID, REDUCED DIPHTHERIA TOXOID AND ACELLULAR PERTUSSIS VACCINE, ADSORBED 0.5 ML: 5; 2.5; 8; 8; 2.5 SUSPENSION INTRAMUSCULAR at 15:08

## 2022-05-20 ASSESSMENT — PAIN SCALES - GENERAL
PAINLEVEL_OUTOF10: 8
PAINLEVEL_OUTOF10: 8
PAINLEVEL_OUTOF10: 10
PAINLEVEL_OUTOF10: 0

## 2022-05-20 ASSESSMENT — PAIN DESCRIPTION - ORIENTATION
ORIENTATION: RIGHT

## 2022-05-20 ASSESSMENT — PAIN DESCRIPTION - LOCATION
LOCATION: HEAD;ELBOW
LOCATION: ELBOW
LOCATION: ELBOW

## 2022-05-20 ASSESSMENT — PAIN - FUNCTIONAL ASSESSMENT: PAIN_FUNCTIONAL_ASSESSMENT: 0-10

## 2022-05-20 ASSESSMENT — PAIN DESCRIPTION - PAIN TYPE
TYPE: ACUTE PAIN
TYPE: ACUTE PAIN

## 2022-05-20 ASSESSMENT — PAIN DESCRIPTION - DESCRIPTORS
DESCRIPTORS: ACHING
DESCRIPTORS: BURNING
DESCRIPTORS: BURNING

## 2022-05-20 NOTE — PLAN OF CARE
Problem: Skin/Tissue Integrity  Goal: Absence of new skin breakdown  Description: 1. Monitor for areas of redness and/or skin breakdown  2. Assess vascular access sites hourly  3. Every 4-6 hours minimum:  Change oxygen saturation probe site  4. Every 4-6 hours:  If on nasal continuous positive airway pressure, respiratory therapy assess nares and determine need for appliance change or resting period.   Outcome: Progressing     Problem: Discharge Planning  Goal: Discharge to home or other facility with appropriate resources  Outcome: Progressing     Problem: Pain  Goal: Verbalizes/displays adequate comfort level or baseline comfort level  Outcome: Progressing     Problem: Safety - Adult  Goal: Free from fall injury  Outcome: Progressing     Problem: ABCDS Injury Assessment  Goal: Absence of physical injury  Outcome: Progressing

## 2022-05-20 NOTE — ED NOTES
2527- Perfect served the hospitalist for a consult. [employer]  05/20/22 2628    [id number] Leighton with Hospitalist called back to speak with Dr. Elizabeth Avilez.       [employer]  05/20/22 [id number]

## 2022-05-20 NOTE — PROGRESS NOTES
Patient admitted to room 211 from ER. Patient oriented to room, call light, bed rails, phone, lights and bathroom. Patient instructed about the schedule of the day including: vital sign frequency, lab draws, possible tests, frequency of MD and staff rounds, daily weights, I &O's and prescribed diet. Bed alarm deferred patient low fall risk and refuses alarm. Telemetry box in place, patient aware of placement and reason. Bed locked, in lowest position, side rails up 2/4, call light within reach. Recliner Assessment:     Patient is not able to demonstrated the ability to move from a reclining position to an upright position within the recliner. however patient is alert, oriented and able to provide informed consent       4 Eyes Skin Assessment     The patient is being assess for   Admission    I agree that 2 RN's have performed a thorough Head to Toe Skin Assessment on the patient. ALL assessment sites listed below have been assessed. Areas assessed for pressure by both nurses:   [x]   Head, Face, and Ears   [x]   Shoulders, Back, and Chest, Abdomen  [x]   Arms, Elbows, and Hands   []   Coccyx, Sacrum, and Ischium  [x]   Legs, Feet, and Heels    Contusion to right forehead, large skin tear to right elbow, tear to right knee, scabs to left knee, healed ulcer to left lateral ankle, red area on right sided neck. Chronic discoloration to bilateral lower legs. Flaky skin. Pt refused turn to assess coccyx at this time r/t pain. Skin Assessed Under all Medical Devices by both nurses:  n/a              All Mepilex Borders were peeled back and area peeked at by both nurses:  No: n/a  Please list where Mepilex Borders are located:  n/a             **SHARE this note so that the co-signing nurse is able to place an eSignature**    Co-signer eSignature: Electronically signed by Clarissa Grace RN on 1/42/45 at 5:44 PM EDT    Does the Patient have Skin Breakdown related to pressure?   Yes [facility] WOUND CARE was Initiated documentation include the Lesly-wound, Wound Assessment, Measurements, Dressing Treatment, Drainage, and Color\",                          Robby Prevention initiated:  Yes   Wound Care Orders initiated:  Yes      [id number] nurse consulted for Pressure Injury (Stage 3,4, Unstageable, DTI, NWPT, Complex wounds)and New or Established Ostomies:  No      Primary Nurse eSignature: Electronically signed by Annabella Page RN on 5/20/22 at 5:42 PM EDT

## 2022-05-20 NOTE — ED NOTES
Writer gave report to Kuldeep Zarate [facility], who will assume care when pt is transported to [facility]. Writer requested transport at time of writing.       Danya Husain RN  05/20/22 [id number]

## 2022-05-20 NOTE — ED PROVIDER NOTES
[address] ED      CHIEF COMPLAINT  Fall (pt woke up in recliner and states he thinks he fell forward out of chair from a sitting position. has a hematoma above Right eye, skin tear to right elbow, and scrapes to both knees. denies LOC. is on warfarin. )       HISTORY OF PRESENT ILLNESS  Anca Downey is a 80 y.o. male with a past medical history of atrial fibrillation on warfarin, and pacemaker who presents to the ED complaining of injuries after a fall. The patient states that he was trying to lift himself out of his chair, when he was leaning over and then fell forward. He denies LOC before or after the fall, denies any chest pain or palpitations. He states that he hit the right side of his head when he fell. He denies any blurred or double vision. He denies a headache or nausea or vomiting. He states that he also fell on his right arm, he has right shoulder pain as well as right elbow pain. He did not injure his legs he has chronic wounds to his lower extremities and has wound care for them. He states they are doing well at baseline. He describes knee pain after the fall. He denies any complaints or such as chest pain or shortness of breath. He denies fever or recent illness. No other complaints, modifying factors or associated symptoms. I have reviewed the following from the nursing documentation.     Past Medical History:   Diagnosis Date    Aortic valve stenosis     Arthritis     shoulder    Hairy cell leukemia, in remission ([facility] [address]     Hypertension     Phlebitis     Pneumonia     Vertigo      Past Surgical History:   Procedure Laterality Date    INTRACAPSULAR CATARACT EXTRACTION Left 3/27/2019    [facility]ACO EMULSIFICATION OF CATARACT WITH INTRAOCULAR LENS IMPLANT EYE performed by Keshia Guo MD at [place] [id number] Right 4/3/2019    PHACO EMULSIFICATION OF CATARACT WITH INTRAOCULAR LENS IMPLANT EYE performed by [facility]ia Guo MD at [facility] OR    PACEMAKER PLACEMENT      2017    SHOULDER SURGERY      SPLENECTOMY       Family History   Problem Relation Age of Onset    Rheumatologic Disease Mother     Arthritis Mother     Cancer Paternal Grandmother     Anesth Problems Neg Hx     Broken Bones Neg Hx     Clotting Disorder Neg Hx     Collagen Disease Neg Hx     Diabetes Neg Hx     Dislocations Neg Hx     Osteoporosis Neg Hx     Scoliosis Neg Hx     Severe Sprains Neg Hx      Social History     Socioeconomic History    Marital status:      Spouse name: Not on file    Number of children: Not on file    Years of education: Not on file    Highest education level: Not on file   Occupational History    Not on file   Tobacco Use    Smoking status: Never Smoker    Smokeless tobacco: Never Used   Vaping Use    Vaping Use: Never used   Substance and Sexual Activity    Alcohol use: Never    Drug use: No    Sexual activity: Yes     Partners: Female   Other Topics Concern    Not on file   Social History Narrative    Not on file     Social Determinants of Health     Financial Resource Strain:     Difficulty of Paying Living Expenses: Not on file   Food Insecurity:     Worried About Running Out of Food in the Last Year: Not on file    Leni of Food in the Last Year: Not on file   Transportation Needs:     Lack of Transportation (Medical): Not on file    Lack of Transportation (Non-Medical):  Not on file   Physical Activity:     Days of Exercise per Week: Not on file    Minutes of Exercise per Session: Not on file   Stress:     Feeling of Stress : Not on file   Social Connections:     Frequency of Communication with Friends and Family: Not on file    Frequency of Social Gatherings with Friends and Family: Not on file    Attends Jainism Services: Not on file    Active Member of Clubs or Organizations: Not on file    Attends Club or Organization Meetings: Not on file    Marital Status: Not on file   Intimate Partner Violence:     Fear of Current or Ex-Partner: Not on file    Emotionally Abused: Not on file    Physically Abused: Not on file    Sexually Abused: Not on file   Housing Stability:     Unable to Pay for Housing in the Last Year: Not on file    Number of Places Lived in the Last Year: Not on file    Unstable Housing in the Last Year: Not on file     Current Facility-Administered Medications   Medication Dose Route Frequency Provider Last Rate Last Admin    ampicillin-sulbactam (UNASYN) 3000 mg ivpb minibag  3,000 mg IntraVENous Once [employer], DO         Current Outpatient Medications   Medication Sig Dispense Refill    finasteride (PROSCAR) 5 MG tablet Take 5 mg by mouth daily      tamsulosin (FLOMAX) 0.4 MG capsule Take 0.4 mg by mouth daily      atorvastatin (LIPITOR) 10 MG tablet Take 40 mg by mouth daily       psyllium (KONSYL) 28.3 % PACK Take 1 packet by mouth daily      warfarin (COUMADIN) 5 MG tablet Take 5 mg by mouth daily      Multiple Vitamins-Minerals (THERAPEUTIC MULTIVITAMIN-MINERALS) tablet Take 1 tablet by mouth daily      lisinopril (PRINIVIL;ZESTRIL) 40 MG tablet        No Known Allergies    REVIEW OF SYSTEMS  10 systems reviewed, pertinent positives per HPI otherwise noted to be negative. PHYSICAL EXAM  BP (!) 144/74   Pulse 78   Temp 98.2 °F (36.8 °C) (Oral)   Resp 15   Ht 5' 9\" (1.753 m)   Wt 170 lb (77.1 kg)   SpO2 94%   BMI 25.10 kg/m²    Physical exam:  General appearance: awake and cooperative. He is in pain distress. Non toxic appearing. Skin: Warm and dry. No rashes or lesions. HENT: Normocephalic. Contusion and ecchymosis to right scalp. Negative gardner's sign. No raccoon eyes. No nasal septal hematoma. No oropharyngeal bleeding. Mucus membranes are moist. Midface stable. Neck: supple. No C spine tenderness midline. Eyes: RADHA. EOM intact. Heart: RRR. No murmurs. Lungs: Respirations unlabored. CTAB. No wheezes, rales, or rhonchi.  Good air exchange  Abdomen: No tenderness. Soft. Non distended. No peritoneal signs. Musculoskeletal: right shoulder with tenderness; ROM intact no deformity; abrasion to anterior shoulder; no soft tissue swelling or ecchymosis; skin tears present to lateral aspect of right elbow with underlying bony tenderness to right elbow; median, radial, and ulnar nerves intact motor and sensory. cardinal motions of hand intact. MCP, PIP, and DIP intact with flexion and extension 5/5 strength. Cap refill <2 seconds. Radial and ulnar pulses +2/4 bilaterally. Tenderness to palpation bilateral knees with limited ROM and abrasions anteriorly. No T/L spine tenderness midline. No step offs. Chronic wounds with erythema and swelling to distal lower extremities. Compartments soft. No deformity. Radial, Dp, and PT pulses +2/4 bilaterally  Neurological: Alert and oriented. No focal deficits. Sensation intact x 4. No aphasia or dysarthria. Psychiatric: Normal mood and affect. LABS  I have reviewed all labs for this visit.    Results for orders placed or performed during the hospital encounter of 05/20/22   CBC with Auto Differential   Result Value Ref Range    WBC 7.4 4.0 - 11.0 K/uL    RBC 3.23 (L) 4.20 - 5.90 M/uL    Hemoglobin 10.6 (L) 13.5 - 17.5 g/dL    Hematocrit 30.4 (L) 40.5 - 52.5 %    MCV 94.2 80.0 - 100.0 fL    MCH 32.7 26.0 - 34.0 pg    MCHC 34.7 31.0 - 36.0 g/dL    RDW 15.5 (H) 12.4 - 15.4 %    Platelets see below 926 - 450 K/uL    MPV 14.7 (H) 5.0 - 10.5 fL    PLATELET SLIDE REVIEW Clumped     SLIDE REVIEW see below     Neutrophils % 52.6 %    Lymphocytes % 38.0 %    Monocytes % 8.8 %    Eosinophils % 0.1 %    Basophils % 0.5 %    Neutrophils Absolute 3.9 1.7 - 7.7 K/uL    Lymphocytes Absolute 2.8 1.0 - 5.1 K/uL    Monocytes Absolute 0.7 0.0 - 1.3 K/uL    Eosinophils Absolute 0.0 0.0 - 0.6 K/uL    Basophils Absolute 0.0 0.0 - 0.2 K/uL    Anisocytosis 1+ (A)    Comprehensive Metabolic Panel w/ Reflex to MG   Result Value Ref Range    Sodium 139 136 - 145 mmol/L    Potassium reflex Magnesium 4.0 3.5 - 5.1 mmol/L    Chloride 104 99 - 110 mmol/L    CO2 27 21 - 32 mmol/L    Anion Gap 8 3 - 16    Glucose 99 70 - 99 mg/dL    BUN 24 (H) 7 - 20 mg/dL    CREATININE 1.0 0.8 - 1.3 mg/dL    GFR Non-African American >60 >60    GFR African American >60 >60    Calcium 8.8 8.3 - 10.6 mg/dL    Total Protein 6.6 6.4 - 8.2 g/dL    Albumin 3.6 3.4 - 5.0 g/dL    Albumin/Globulin Ratio 1.2 1.1 - 2.2    Total Bilirubin 0.9 0.0 - 1.0 mg/dL    Alkaline Phosphatase 80 40 - 129 U/L    ALT 15 10 - 40 U/L    AST 18 15 - 37 U/L   Troponin   Result Value Ref Range    Troponin 0.02 (H) <0.01 ng/mL   Protime-INR   Result Value Ref Range    Protime 35.4 (H) 9.9 - 12.7 sec    INR 2.99 (H) 0.88 - 1.12   Troponin   Result Value Ref Range    Troponin 0.02 (H) <0.01 ng/mL   EKG 12 Lead   Result Value Ref Range    Ventricular Rate 75 BPM    Atrial Rate 50 BPM    QRS Duration 144 ms    Q-T Interval 418 ms    QTc Calculation (Bazett) 466 ms    R Axis 80 degrees    T Axis -81 degrees    Diagnosis       Demand pacemaker; interpretation is based on intrinsic rhythmWide QRS rhythm with occasional and consecutive Premature ventricular complexesBaseline artifactAbnormal ECGNo previous ECGs availableConfirmed by THERESA SALGADO MD ([id number]) on 5/20/2022 7:33:38 AM       ECG  The Ekg interpreted by me shows  normal pacemaker rhythm with a rate of 75  Axis is   Right axis deviation  QTc is  466  Intervals and Durations are unremarkable. ST Segments: appropriate discordance no ischemia   No prior ekg for comparison     RADIOLOGY    XR SHOULDER RIGHT (MIN 2 VIEWS)    Result Date: 5/20/2022  EXAMINATION: THREE XRAY VIEWS OF THE RIGHT ELBOW; THREE XRAY VIEWS OF THE RIGHT SHOULDER 5/20/2022 4:20 am COMPARISON: None.  HISTORY: ORDERING SYSTEM PROVIDED HISTORY: fall TECHNOLOGIST PROVIDED HISTORY: Reason for exam:->fall Reason for Exam: rt elbow pain; ORDERING SYSTEM PROVIDED HISTORY: fall TECHNOLOGIST PROVIDED HISTORY: Reason for exam:->fall Reason for Exam: rt shoulder pain, limited range of motion, fall FINDINGS: Right shoulder: Partial visualization of thoracic aortic atherosclerotic disease in an implanted cardiac device. Advanced degenerative change noted at the acromioclavicular and glenohumeral joints. Chronic deformity of the right humeral head related to underlying chronic degeneration. No dislocation is seen. No scapular fracture. No definite humeral fracture. Right elbow: Osteophytes seen along the epicondyles likely related to prior epicondylitis. No acute fracture. No dislocation is seen. No significant joint effusion seen on the lateral view posteriorly. Degenerative change noted at the elbow joint. Peripheral arterial disease. Advanced degenerative changes seen within the right shoulder as well as degenerative change in the right elbow, though no acute osseous fracture or dislocation. Peripheral arterial disease. XR ELBOW RIGHT (MIN 3 VIEWS)    Result Date: 5/20/2022  EXAMINATION: THREE XRAY VIEWS OF THE RIGHT ELBOW; THREE XRAY VIEWS OF THE RIGHT SHOULDER 5/20/2022 4:20 am COMPARISON: None. HISTORY: ORDERING SYSTEM PROVIDED HISTORY: fall TECHNOLOGIST PROVIDED HISTORY: Reason for exam:->fall Reason for Exam: rt elbow pain; ORDERING SYSTEM PROVIDED HISTORY: fall TECHNOLOGIST PROVIDED HISTORY: Reason for exam:->fall Reason for Exam: rt shoulder pain, limited range of motion, fall FINDINGS: Right shoulder: Partial visualization of thoracic aortic atherosclerotic disease in an implanted cardiac device. Advanced degenerative change noted at the acromioclavicular and glenohumeral joints. Chronic deformity of the right humeral head related to underlying chronic degeneration. No dislocation is seen. No scapular fracture. No definite humeral fracture. Right elbow: Osteophytes seen along the epicondyles likely related to prior epicondylitis.   No acute fracture. No dislocation is seen. No significant joint effusion seen on the lateral view posteriorly. Degenerative change noted at the elbow joint. Peripheral arterial disease. Advanced degenerative changes seen within the right shoulder as well as degenerative change in the right elbow, though no acute osseous fracture or dislocation. Peripheral arterial disease. XR KNEE LEFT (1-2 VIEWS)    Result Date: 5/20/2022  EXAMINATION: 2 XRAY VIEWS OF THE LEFT KNEE; 2 XRAY VIEWS OF THE RIGHT KNEE 5/20/2022 4:20 am COMPARISON: None. HISTORY: ORDERING SYSTEM PROVIDED HISTORY: fall TECHNOLOGIST PROVIDED HISTORY: Reason for exam:->fall Reason for Exam: left knee pain; ORDERING SYSTEM PROVIDED HISTORY: fall TECHNOLOGIST PROVIDED HISTORY: Reason for exam:->fall Reason for Exam: rt knee pain FINDINGS: Left knee: Meniscal calcification. Peripheral arterial disease. Tricompartmental osteoarthritis. No fracture or dislocation. Small suprapatellar joint effusion. Right knee: Meniscal calcification. No fracture or dislocation. No osseous destructive process. Ovoid hyperdense loose body seen in the posterior joint space measuring approximately 13 mm in size. No significant joint effusion. Peripheral arterial disease. Bilateral tricompartmental osteoarthritis. Bilateral meniscal calcification. Small intra-articular body in the posterior joint space measuring 13 mm on the right. Small suprapatellar joint effusion on the left. No acute fracture seen in the knees. XR KNEE RIGHT (1-2 VIEWS)    Result Date: 5/20/2022  EXAMINATION: 2 XRAY VIEWS OF THE LEFT KNEE; 2 XRAY VIEWS OF THE RIGHT KNEE 5/20/2022 4:20 am COMPARISON: None.  HISTORY: ORDERING SYSTEM PROVIDED HISTORY: fall TECHNOLOGIST PROVIDED HISTORY: Reason for exam:->fall Reason for Exam: left knee pain; ORDERING SYSTEM PROVIDED HISTORY: fall TECHNOLOGIST PROVIDED HISTORY: Reason for exam:->fall Reason for Exam: rt knee pain FINDINGS: Left knee: Meniscal calcification. Peripheral arterial disease. Tricompartmental osteoarthritis. No fracture or dislocation. Small suprapatellar joint effusion. Right knee: Meniscal calcification. No fracture or dislocation. No osseous destructive process. Ovoid hyperdense loose body seen in the posterior joint space measuring approximately 13 mm in size. No significant joint effusion. Peripheral arterial disease. Bilateral tricompartmental osteoarthritis. Bilateral meniscal calcification. Small intra-articular body in the posterior joint space measuring 13 mm on the right. Small suprapatellar joint effusion on the left. No acute fracture seen in the knees. CT Head WO Contrast    Result Date: 5/20/2022  EXAMINATION: CT OF THE HEAD WITHOUT CONTRAST  5/20/2022 4:21 am TECHNIQUE: CT of the head was performed without the administration of intravenous contrast. Automated exposure control, iterative reconstruction, and/or weight based adjustment of the mA/kV was utilized to reduce the radiation dose to as low as reasonably achievable. COMPARISON: No prior comparisons with images available. MRI/MRA report on 05/05/1998. HISTORY: ORDERING SYSTEM PROVIDED HISTORY: fall TECHNOLOGIST PROVIDED HISTORY: Reason for exam:->fall Has a \"code stroke\" or \"stroke alert\" been called? ->No Decision Support Exception - unselect if not a suspected or confirmed emergency medical condition->Emergency Medical Condition (MA) FINDINGS: BRAIN/VENTRICLES: There is no acute intracranial hemorrhage, mass effect or midline shift. No abnormal extra-axial fluid collection. The gray-white differentiation is maintained without evidence of an acute infarct. There is no evidence of hydrocephalus. Generalized age-related cortical atrophy. Tortuous basilar artery with history of dolichoectasia of the vertebrobasilar system. There is a arachnoid cyst within the right anterior temporal fossa, also seen on previous MR imaging per report.   Patchy white matter low attenuation seen within the periventricular and subcortical white matter. ORBITS: The visualized portion of the orbits demonstrate no acute abnormality. SINUSES: The visualized paranasal sinuses and mastoid air cells demonstrate no acute abnormality. SOFT TISSUES/SKULL:  Soft tissue swelling seen overlying the right frontal scalp. No underlying calvarial fracture is identified. No acute intracranial abnormality. Chronic generalized age-appropriate cortical atrophy with chronic microvascular ischemic changes. CT CHEST WO CONTRAST    Result Date: 5/20/2022  EXAMINATION: CT OF THE CHEST WITHOUT CONTRAST 5/20/2022 6:12 am TECHNIQUE: CT of the chest was performed without the administration of intravenous contrast. Multiplanar reformatted images are provided for review. Automated exposure control, iterative reconstruction, and/or weight based adjustment of the mA/kV was utilized to reduce the radiation dose to as low as reasonably achievable. COMPARISON: None. HISTORY: ORDERING SYSTEM PROVIDED HISTORY: left sided infiltrate; fall on warfarin TECHNOLOGIST PROVIDED HISTORY: Reason for exam:->left sided infiltrate; fall on warfarin Decision Support Exception - unselect if not a suspected or confirmed emergency medical condition->Emergency Medical Condition (MA) Reason for Exam: ledt side infiltrate , fall FINDINGS: Mediastinum: No thoracic aortic aneurysm is identified. Postoperative change from prior TAVR an implanted cardiac device placement. Diffuse atherosclerosis with heavy coronary artery involvement. There is a moderate size hiatal hernia. No focal esophageal thickening is identified. Small subcentimeter mediastinal lymph nodes are noted. Small volume of fluid seen in the pericardial recesses. Lungs/pleura: Trace right and small left pleural effusion. Patchy ground-glass infiltrates are seen, minimally within the right upper and lower lobe, and more so within the lingula and left lower lobe.   No pneumothorax. Upper Abdomen: Adrenal adenomas. No renal calculi or hydroureteronephrosis. Significant stool volume seen in the colon which may be related to constipation, not entirely imaged. Soft Tissues/Bones: Diffuse osteopenia. No acute fracture seen in the region of the shoulders. No acute left-sided rib fracture is seen. Exaggerated kyphotic curvature. Degenerative changes are seen in the lower cervical spine and throughout the thoracic spine. No acute vertebral body compression fracture. 1. Patchy ground-glass infiltrates are seen within the lingula and left lower lobe predominantly, though minimally within the right upper and lower lobe which may represent asymmetric edema or multifocal pneumonia. 2. Mild cardiomegaly with indwelling cardiac leads as well as postoperative change from prior TAVR. 3. Moderate hiatal hernia. 4. Diffuse atherosclerosis with coronary artery involvement. 5. No acute chest wall fracture or pneumothorax. 6. Moderate stool volume partially visualized in the upper abdomen suggesting constipation. CT Cervical Spine WO Contrast    Result Date: 5/20/2022  EXAMINATION: CT OF THE CERVICAL SPINE WITHOUT CONTRAST 5/20/2022 4:21 am TECHNIQUE: CT of the cervical spine was performed without the administration of intravenous contrast. Multiplanar reformatted images are provided for review. Automated exposure control, iterative reconstruction, and/or weight based adjustment of the mA/kV was utilized to reduce the radiation dose to as low as reasonably achievable. COMPARISON: None. HISTORY: ORDERING SYSTEM PROVIDED HISTORY: fall TECHNOLOGIST PROVIDED HISTORY: Reason for exam:->fall Decision Support Exception - unselect if not a suspected or confirmed emergency medical condition->Emergency Medical Condition (MA) Reason for Exam: fall, neck pain FINDINGS: The odontoid process appears intact. The C1 ring appears intact as well. Occipital condyles appear intact.   Dextroscoliotic deformity centered at the lower cervical spine. No jumped facet joints are seen. Spinous processes appear intact. Mild anterolisthesis of C2 on C3. There are numerous small scatter radial lucency seen throughout the osseous structures. Interstitial edema seen within the lung apices. Carotid calcifications are identified. 1. No acute cervical spine fracture. 2. Extensive multilevel degenerative changes seen within the cervical spine. 3. Numerous small radiolucencies are seen scattered through the osseous structures, which could be related to underlying osteopenia, however other etiologies such as multiple myeloma or metastatic disease are in the differential.     XR CHEST PORTABLE    Result Date: 5/20/2022  EXAMINATION: ONE XRAY VIEW OF THE CHEST 5/20/2022 4:20 am COMPARISON: None. HISTORY: ORDERING SYSTEM PROVIDED HISTORY: fall TECHNOLOGIST PROVIDED HISTORY: Reason for exam:->fall Reason for Exam: fall, chest pain FINDINGS: Cardiomegaly. Left-sided pacer device. Postoperative change from prior TAVR. Degenerative changes noted in the spine and shoulders. No pneumothorax is identified. Left basilar airspace disease and possible effusion. No acute osseous fracture is identified. Cardiomegaly with left basilar airspace disease and effusion. ED COURSE/MDM  Patient seen and evaluated. Old records reviewed. Labs and imaging reviewed and results discussed with patient. This is a 26-year-old male presenting for evaluation after a fall. He states that he somehow fell out of his chair at home after sleeping. He is mildly hypertensive on arrival, vital signs otherwise within normal limits. He is overall nontoxic-appearing. He does have injury to his head, as well as his right shoulder and elbow. He is anticoagulated on warfarin. Work-up was obtained. Head CT and cervical spine CT are negative for any acute traumatic process.   He does not have an acute fracture or dislocation of his right shoulder or elbow. His skin tears on his elbow were dressed and bandaged. He also has abrasions to his bilateral knees, without acute fracture. He was given Tylenol for pain. He normally ambulates at home with a walker, and due to his right shoulder and elbow pain from the fall he does not feel as though he would be able to function at home and use a walker. He and his wife are concerned about him performing his activities of daily living at home given his recent fall and injuries. Therefore he will be admitted for PT. His blood work shows chronic anemia and thrombocytopenia. He has an elevated troponin at 0.02 although it is flat, his EKG shows a paced rhythm without ischemia and I do not suspect ACS. His chest CT shows no evidence of hemothorax or rib fractures from the fall. He does however have pulmonary infiltrate he denies any symptoms of pneumonia. His wife states that he sometimes coughs when he eats I did cover him with Unasyn. He is oxygenating well. During the patient's ED course, the patient was given:  Medications   ampicillin-sulbactam (UNASYN) 3000 mg ivpb minibag (has no administration in time range)   acetaminophen (TYLENOL) tablet 650 mg (325 mg Oral Given 5/20/22 0402)        CLINICAL IMPRESSION  1. Closed head injury, initial encounter    2. Fall, initial encounter    3. Injury of right shoulder, initial encounter    4. Elbow injury, right, initial encounter    5. Skin tear of right elbow without complication, initial encounter    6. Anticoagulated    7. Chronic anemia    8. Thrombocytopenia ([address]    9. Pulmonary infiltrates        Blood pressure (!) 144/74, pulse 78, temperature 98.2 °F (36.8 °C), temperature source Oral, resp. rate 15, height 5' 9\" (1.753 m), weight 170 lb (77.1 kg), SpO2 94 %. Patient was given scripts for the following medications. I counseled patient how to take these medications.    New Prescriptions    No medications on file       Follow-up with:  No follow-up provider specified. DISCLAIMER: This chart was created using Dragon dictation software. Efforts were made by me to ensure accuracy, however some errors may be present due to limitations of this technology and occasionally words are not transcribed correctly.      Jyoti MaxFayette Medical Centeralicia  05/20/22 2196

## 2022-05-20 NOTE — PROGRESS NOTES
Pharmacy Note  Warfarin Consult  Dx: Afib  Goal INR range 2-3   Home Warfarin dose: Alternates 2.5mg/5mg daily       Date  INR  Warfarin  5/20                2.99                  2.5mg  Recommend Warfarin 2.5mg tonight x1. Daily INR ordered. Rx will continue to manage therapy per consult order.   Addis Villa Pharm D 7/32/87868:08 PM  .

## 2022-05-20 NOTE — ED NOTES
Pt resting in bed at this time, no needs. Writer will order breakfast when diet order is placed.       Greer Carrel, RN  05/20/22 6275

## 2022-05-20 NOTE — PLAN OF CARE
Admit to 2w with telemetry    Fall at home  On coumadin- hematoma right eye and skin tear  CT of head showed no acute intracranial abnormality   CT of chest showed LLL,  RUL and RLL GGO which may represent asymmetric edema or multifocal PNA. Pt wife stated coughs when he has been eating. Treating for Unasyn at this time pending speech evaluation. On room air, no leukocytosis. Right shoulder pain- xray without acute fx or dislocation   Flat troponin elevation- no acute EKG changes    PT/OT consulted, likely will need SNF placement         Home medications and code status needs verified. Nursing communication placed. Home mediations will need to be ordered once verified.      JULIETTE Nam - CNP

## 2022-05-20 NOTE — H&P
Hospital Medicine History & Physical      PCP: Kristen Pepper MD    Date of Admission: 5/20/2022    Date of Service: Pt seen/examined on 5/20/2022      Chief Complaint:    Chief Complaint   Patient presents with   Hendrix Fall     pt woke up in recliner and states he thinks he fell forward out of chair from a sitting position. has a hematoma above Right eye, skin tear to right elbow, and scrapes to both knees. denies LOC. is on warfarin. History Of Present Illness: The patient is a 80 y.o. male with HTN, Afib, CAD, PAD, SA node dysfunction, Complete AV block s/p pacemaker, aortic valve stenosis s/p TAVR, anemia of chronic disease, thrombocytopenia, Myelodysplastic syndrome, hx of skin neoplasm, history of hairy cell leukemia, in remission and hx of splenectomy who presented to [facility] ED with complaint of fall. Patient states that this morning around 4 AM he he woke up and was going to get out of his recliner to urinate. States that he lost about a second and half of the time and before he knew it he was on the ground. He does have an electric recliner that aids him standing up and states he may have been pushing the button to elevate him. He states he cannot piece together how he fell and does not believe that he had attempted to stand up but he did hit his head when he fell. He is on Coumadin for anticoagulation. He states that he has a fear of falling and is generally very careful around the home. He is able to walk at baseline for short distances but typically will walk with a walker to his electric chair which is how he typically gets around the house. He lives with his wife who has MS and states he typically helps her with things around the house. They have home health aides that come and help them during the week with showers and other ADLs. He did not complain of any pain at this time apart from chronic pain in bilateral shoulders.   He denies feelings of presyncope, dizziness, chest pain, shortness of breath, nausea/vomiting/diarrhea, numbness/tingling/swelling. His vitals are stable. Labs are stable. Imaging displays no acute bony abnormalities. Chest x-ray displayed left basilar airspace disease and CT redemonstrated this and further classified as patchy groundglass infiltrates in the left lower lobe and lingula. Also seen minimally within the right upper and lower lobe as well. Incidentally cervical CT spine demonstrated numerous small radiolucency and seen scattered throughout osseous structures with a differential of osteopenia, multiple myeloma or metastatic disease. He is admitted for further care. I did discuss CODE STATUS with the patient and he wishes to be DNR-CCA. Past Medical History:        Diagnosis Date    Aortic valve stenosis     Arthritis     shoulder    Hairy cell leukemia, in remission ([facility] [address]     Hypertension     Phlebitis     Pneumonia     Vertigo        Past Surgical History:        Procedure Laterality Date    INTRACAPSULAR CATARACT EXTRACTION Left 3/27/2019    PHACO EMULSIFICATION OF CATARACT WITH INTRAOCULAR LENS IMPLANT EYE performed by Quiana Espitia MD at [address] [place] [address] Right 4/3/2019    PHACO EMULSIFICATION OF CATARACT WITH INTRAOCULAR LENS IMPLANT EYE performed by Quiana Espitia MD at [place] [address]    SHOULDER SURGERY      SPLENECTOMY         Medications Prior to Admission:    Prior to Admission medications    Medication Sig Start Date End Date Taking?  Authorizing Provider   psyllium (METAMUCIL) 28 % packet Take 1 packet by mouth daily    Historical Provider, MD   finasteride (PROSCAR) 5 MG tablet Take 5 mg by mouth daily    Historical Provider, MD   tamsulosin (FLOMAX) 0.4 MG capsule Take 0.4 mg by mouth daily  Patient not taking: Reported on 5/20/2022    Historical Provider, MD   atorvastatin (LIPITOR) 10 MG tablet Take 40 mg by mouth daily     Historical Provider, MD   warfarin (COUMADIN) 5 MG tablet Take 5 mg by mouth daily 5 mg ev other day; 2.5 mg other days    Historical Provider, MD   Multiple Vitamins-Minerals (THERAPEUTIC MULTIVITAMIN-MINERALS) tablet Take 1 tablet by mouth daily    Historical Provider, MD   lisinopril (PRINIVIL;ZESTRIL) 40 MG tablet  9/9/14   Historical Provider, MD       Allergies:  Patient has no known allergies. Social History:  The patient currently lives at home with wife    TOBACCO:   reports that he has never smoked. He has never used smokeless tobacco.  ETOH:   reports no history of alcohol use. Family History:   Positive as follows:        Problem Relation Age of Onset    Rheumatologic Disease Mother     Arthritis Mother     Cancer Paternal Grandmother     Anesth Problems Neg Hx     Broken Bones Neg Hx     Clotting Disorder Neg Hx     Collagen Disease Neg Hx     Diabetes Neg Hx     Dislocations Neg Hx     Osteoporosis Neg Hx     Scoliosis Neg Hx     Severe Sprains Neg Hx        REVIEW OF SYSTEMS:       Constitutional: Negative for fever   HENT: Negative for sore throat   Eyes: Negative for redness   Respiratory: Negative  for dyspnea, cough   Cardiovascular: Negative for chest pain   Gastrointestinal: Negative for vomiting, diarrhea   Genitourinary: Negative for hematuria   Musculoskeletal: Negative for arthralgias, +chronic shoulder pain  Skin: Negative for rash, + scattered bruises, + hematoma on forehead  Neurological: Negative for syncope   Hematological: Negative for adenopathy   Psychiatric/Behavorial: Negative for anxiety    PHYSICAL EXAM:    BP (!) 142/79   Pulse 69   Temp 98.2 °F (36.8 °C) (Oral)   Resp 20   Ht 5' 9\" (1.753 m)   Wt 170 lb (77.1 kg)   SpO2 93%   BMI 25.10 kg/m²     Gen: No distress. Alert. Pleasant elderly male. Eyes: PERRL. No sclera icterus. No conjunctival injection. ENT: No discharge. Pharynx clear. Neck: No JVD. Trachea midline. Resp: No accessory muscle use. No crackles. No wheezes. No rhonchi.    CV: Regular rate. Regular rhythm. + Murmur. No rub. No edema. Capillary Refill: Brisk,< 3 seconds   Peripheral Pulses: +2 palpable, equal bilaterally   GI: Non-tender. Non-distended. No masses. No organomegaly. Normal bowel sounds. No hernia appreciated. : Right testicular hydrocele, nontender to palpation  Skin: Warm and dry. No nodule on exposed extremities. No rash on exposed extremities. Scattered bruises about the extremities that do not appear new. Chronic skin changes of the bilateral lower extremities. Left ankle wrapped in clean and dry gauze 2/2 chronic ulcer. Right elbow wrapped in gauze with reported skin tear acute fall. Some blood noted about gauze but not completely saturated. Hematoma noted on the right side of the forehead. M/S: No cyanosis. No joint deformity. No clubbing. Limited range of motion of arms due to chronic shoulder pain and arthritis bilaterally. Neuro: Awake. Grossly nonfocal    Psych: Oriented x 3. No anxiety or agitation. CBC:   Recent Labs     05/20/22 0419   WBC 7.4   HGB 10.6*   HCT 30.4*   MCV 94.2   PLT see below     BMP:   Recent Labs     05/20/22 0419      K 4.0      CO2 27   BUN 24*   CREATININE 1.0     LIVER PROFILE:   Recent Labs     05/20/22 0419   AST 18   ALT 15   BILITOT 0.9   ALKPHOS 80     PT/INR:   Recent Labs     05/20/22 0419   PROTIME 35.4*   INR 2.99*       CARDIAC ENZYMES  Recent Labs     05/20/22 0419 05/20/22  0650   TROPONINI 0.02* 0.02*     CULTURES  Results for [employer] (MRN [id number]) as of 5/20/2022 13:33   Ref.  Range 5/20/2022 08:06   SARS-CoV-2, NAAT Latest Ref Range: Not Detected  Not Detected       EKG:  I have reviewed the EKG with the following interpretation:   Demand pacemaker; interpretation is based on intrinsic rhythm  Wide QRS rhythm with occasional and consecutive Premature ventricular complexes  Baseline artifact  Abnormal ECG  No previous ECGs available  Confirmed by Priyanka SALGADO MD ([id number]) fracture seen in the knees. XR CHEST PORTABLE   Final Result   Cardiomegaly with left basilar airspace disease and effusion. CT Cervical Spine WO Contrast   Final Result   1. No acute cervical spine fracture. 2. Extensive multilevel degenerative changes seen within the cervical spine. 3. Numerous small radiolucencies are seen scattered through the osseous   structures, which could be related to underlying osteopenia, however other   etiologies such as multiple myeloma or metastatic disease are in the   differential.              ASSESSMENT/PLAN:  #Fall  #Closed head injury, on Coumadin  -Patient unclear of how he fell, ? LOC, denies presyncope, lightheadedness or dizziness  -On AC and hit head, no acute bleed on head CT  -Hematoma on right side of forehead; monitor for increase in size  -Thankfully no acute bony injury on imaging  -Skin tear to right elbow; wound care consulted  -Check orthostatics  -PTOT  -CM for SNF placement    #Possible aspiration PNA vs pneumonitis  -Patient does endorse intermittent episodes of dysphagia leading to choking and vomiting.  Has not occurred for some time.   -Imaging as above  -He is afebrile with not leukocytosis  -Unasyn for now  -SLP eval and treat    #HTN  -BP controlled  -Continue lisinopril  -Monitor    #Troponin elevation  -0.02--0.02--trend  -EKG without apparent ischemia  -No CP  -Do not suspect ACS    #Constipation  -Continue metamucil  -Glycolax PRN    #Incidental findings on CT   -Cervical CT with numerous small radiolucencies are seen scattered through the osseous   structures, which could be related to underlying osteopenia, however other   etiologies such as multiple myeloma or metastatic disease are in the   differential.   -Discussed with patient  -Recommended follow up with Oncologist as OP for recommendations    #Testicular hydrocele  -Right side; non-tender  -Has urology appt in June  -Recommend OP follow up with uro    #Right inguinal hernia; patient reported  -Could not appreciate on exam  -Suspect direct given symptoms  -Discussed options and red flag sxs  -Recommended support briefs and follow up with General surgery as needed    #CAD  #PAD  -On statin, no antiplatelet given blood disorders  -Chronic ulcer on left ankle previously seen by wound care  -Wound care for above     #Afib  #Complete AV block s/p pacemaker  #SA node dysfunction  #Aortic valve stenosis s/p TAVR  -Fw [facility] cardiology  -Monitor on tele  -Pharm to dose coumadin    #Anemia of chronic disease  -Hgb stable at baseline  -No s/s of active hemorrhage   -Fw Heme/Onc  -S/p Procrit injections    #Chronic Thrombocytopenia  #Myelodysplastic syndrome  #Hx of hairy cell leukemia, in remission  -Fw Heme/Onc    #Hx of skin neoplasm  -Sees dermatologist regularly    #Hx of splenectomy     DVT Prophylaxis: Coumadin  Diet: ADULT DIET;  Regular; Low Fat/Low Chol/High Fiber/BENY  Code Status: DNR-CCA    Jerry Ruvalcaba PA-C  5/20/2022 1:51 PM

## 2022-05-20 NOTE — ED NOTES
Orthostatic BP completed by Mayra Lim and charted in flowsheets. Writer requested hospital bed for pt comfort, moved pt to hospital bed with linen change, pt comfortable, resting in bed at this time, wife at bedside, no needs.       Deepika Rider RN  05/20/22 6089

## 2022-05-20 NOTE — PLAN OF CARE
SLP completed evaluation. Please refer to notes in EMR.     [address]  Clinician

## 2022-05-20 NOTE — ED NOTES
Skin tear to left elbow cleansed with normal saline. Clean dry dressing of adaptic, 4x4 gauze and kirlex placed.       Daija Amado RN  05/20/22 9779

## 2022-05-20 NOTE — CARE COORDINATION
· Fax: Other Community Services: n/a     DISCHARGE PLAN: Explained Case Management role/services. CM reviewed chart and met with patient at bedside to discuss discharge needs and plan. Patient lives in home with wife who has MS. States independent with the use of DME's. Able to ambulate short distances in home with walker but primarily uses power chair for mobility. States he sleeps in his lift chair but does have hospital bed. Receives OT and SN through [facility]. They provide supervision with bathing. Plans to return home with resumption of [place] [id number] services. CM discussed possibility of SNF placement but patient declines stating he has been to one in past and prefers to return home. CM contacted [facility] who confirmed patient receives OT and SN services. Request to be notified at discharge. CM to follow.      Jose Acuna RN

## 2022-05-21 LAB
ACANTHOCYTES: ABNORMAL
ANION GAP SERPL CALCULATED.3IONS-SCNC: 8 MMOL/L (ref 3–16)
ANISOCYTOSIS: ABNORMAL
BASOPHILS ABSOLUTE: 0 K/UL (ref 0–0.2)
BASOPHILS RELATIVE PERCENT: 0.5 %
BUN BLDV-MCNC: 21 MG/DL (ref 7–20)
CALCIUM SERPL-MCNC: 8.3 MG/DL (ref 8.3–10.6)
CHLORIDE BLD-SCNC: 107 MMOL/L (ref 99–110)
CO2: 26 MMOL/L (ref 21–32)
CREAT SERPL-MCNC: 1 MG/DL (ref 0.8–1.3)
EOSINOPHILS ABSOLUTE: 0 K/UL (ref 0–0.6)
EOSINOPHILS RELATIVE PERCENT: 0.1 %
GFR AFRICAN AMERICAN: >60
GFR NON-AFRICAN AMERICAN: >60
GLUCOSE BLD-MCNC: 80 MG/DL (ref 70–99)
HCT VFR BLD CALC: 28.7 % (ref 40.5–52.5)
HEMOGLOBIN: 9.9 G/DL (ref 13.5–17.5)
LYMPHOCYTES ABSOLUTE: 2.1 K/UL (ref 1–5.1)
LYMPHOCYTES RELATIVE PERCENT: 31.1 %
MCH RBC QN AUTO: 32.7 PG (ref 26–34)
MCHC RBC AUTO-ENTMCNC: 34.6 G/DL (ref 31–36)
MCV RBC AUTO: 94.6 FL (ref 80–100)
MONOCYTES ABSOLUTE: 0.9 K/UL (ref 0–1.3)
MONOCYTES RELATIVE PERCENT: 12.7 %
NEUTROPHILS ABSOLUTE: 3.7 K/UL (ref 1.7–7.7)
NEUTROPHILS RELATIVE PERCENT: 55.6 %
PDW BLD-RTO: 15.5 % (ref 12.4–15.4)
PLATELET # BLD: 47 K/UL (ref 135–450)
PLATELET SLIDE REVIEW: ABNORMAL
PMV BLD AUTO: 14.9 FL (ref 5–10.5)
POTASSIUM REFLEX MAGNESIUM: 3.9 MMOL/L (ref 3.5–5.1)
RBC # BLD: 3.04 M/UL (ref 4.2–5.9)
SCHISTOCYTES: ABNORMAL
SLIDE REVIEW: ABNORMAL
SODIUM BLD-SCNC: 141 MMOL/L (ref 136–145)
WBC # BLD: 6.7 K/UL (ref 4–11)

## 2022-05-21 PROCEDURE — 6370000000 HC RX 637 (ALT 250 FOR IP): Performed by: NURSE PRACTITIONER

## 2022-05-21 PROCEDURE — 97110 THERAPEUTIC EXERCISES: CPT

## 2022-05-21 PROCEDURE — 2580000003 HC RX 258: Performed by: NURSE PRACTITIONER

## 2022-05-21 PROCEDURE — 96361 HYDRATE IV INFUSION ADD-ON: CPT

## 2022-05-21 PROCEDURE — 80048 BASIC METABOLIC PNL TOTAL CA: CPT

## 2022-05-21 PROCEDURE — 97162 PT EVAL MOD COMPLEX 30 MIN: CPT

## 2022-05-21 PROCEDURE — 6360000002 HC RX W HCPCS: Performed by: NURSE PRACTITIONER

## 2022-05-21 PROCEDURE — 97530 THERAPEUTIC ACTIVITIES: CPT

## 2022-05-21 PROCEDURE — 36415 COLL VENOUS BLD VENIPUNCTURE: CPT

## 2022-05-21 PROCEDURE — 85025 COMPLETE CBC W/AUTO DIFF WBC: CPT

## 2022-05-21 PROCEDURE — 97116 GAIT TRAINING THERAPY: CPT

## 2022-05-21 PROCEDURE — 99225 PR SBSQ OBSERVATION CARE/DAY 25 MINUTES: CPT | Performed by: INTERNAL MEDICINE

## 2022-05-21 PROCEDURE — 96366 THER/PROPH/DIAG IV INF ADDON: CPT

## 2022-05-21 PROCEDURE — 97166 OT EVAL MOD COMPLEX 45 MIN: CPT

## 2022-05-21 PROCEDURE — G0378 HOSPITAL OBSERVATION PER HR: HCPCS

## 2022-05-21 RX ADMIN — AMPICILLIN SODIUM AND SULBACTAM SODIUM 3000 MG: 2; 1 INJECTION, POWDER, FOR SOLUTION INTRAMUSCULAR; INTRAVENOUS at 08:58

## 2022-05-21 RX ADMIN — SODIUM CHLORIDE, PRESERVATIVE FREE 10 ML: 5 INJECTION INTRAVENOUS at 08:56

## 2022-05-21 RX ADMIN — LISINOPRIL 40 MG: 20 TABLET ORAL at 08:55

## 2022-05-21 RX ADMIN — PSYLLIUM HUSK 1 PACKET: 3.4 POWDER ORAL at 08:55

## 2022-05-21 RX ADMIN — SODIUM CHLORIDE, PRESERVATIVE FREE 10 ML: 5 INJECTION INTRAVENOUS at 20:17

## 2022-05-21 RX ADMIN — ATORVASTATIN CALCIUM 40 MG: 40 TABLET, FILM COATED ORAL at 08:55

## 2022-05-21 RX ADMIN — SODIUM CHLORIDE 250 ML: 9 INJECTION, SOLUTION INTRAVENOUS at 02:43

## 2022-05-21 RX ADMIN — FINASTERIDE 5 MG: 5 TABLET, FILM COATED ORAL at 08:55

## 2022-05-21 RX ADMIN — AMPICILLIN SODIUM AND SULBACTAM SODIUM 3000 MG: 2; 1 INJECTION, POWDER, FOR SOLUTION INTRAMUSCULAR; INTRAVENOUS at 02:46

## 2022-05-21 ASSESSMENT — PAIN SCALES - GENERAL: PAINLEVEL_OUTOF10: 0

## 2022-05-21 NOTE — PROGRESS NOTES

## 2022-05-21 NOTE — PROGRESS NOTES
IM Progress Note    Admit Date:  5/20/2022  0    Interval history:  fall    Subjective:  Mr. Juan Ball seen sitting up in chair ,feels good today except , worried about wounds on right elbow     Objective:   BP (!) 157/68   Pulse 82   Temp 96.8 °F (36 °C) (Oral)   Resp 18   Ht 5' 9\" (1.753 m)   Wt 171 lb (77.6 kg)   SpO2 93%   BMI 25.25 kg/m²     Intake/Output Summary (Last 24 hours) at 5/21/2022 0630  Last data filed at 5/21/2022 3835  Gross per 24 hour   Intake 10 ml   Output 325 ml   Net -315 ml       Physical Exam:        General:  Elderly male up in chair  HEENT - right frontal hematoma noted with surrounding ecchymoses  Awake, alert and oriented. Appears to be not in any distress  Mucous Membranes:  Pink , anicteric  Neck: No JVD, no carotid bruit, no thyromegaly  Chest:  Clear to auscultation bilaterally, no added sounds  Cardiovascular:  RRR S1S2 heard, no murmurs or gallops  Abdomen:  Soft, undistended, non tender, no organomegaly, BS present  Extremities: large dressing to elbow with seeping blood , dry dressing to right knee  Chronic skin changes and purple discoloration to both ankles    No edema or cyanosis.  Distal pulses well felt  Neurological : grossly normal        Medications:   Scheduled Medications:    sodium chloride flush  5-40 mL IntraVENous 2 times per day    ampicillin-sulbactam  3,000 mg IntraVENous Q6H    atorvastatin  40 mg Oral Daily    finasteride  5 mg Oral Daily    lisinopril  40 mg Oral Daily    therapeutic multivitamin-minerals  1 tablet Oral Daily    psyllium  1 packet Oral Daily    warfarin placeholder: dosing by pharmacy   Other RX Placeholder     I   sodium chloride 250 mL (05/21/22 0243)     sodium chloride flush, sodium chloride, ondansetron **OR** ondansetron, acetaminophen **OR** acetaminophen, polyethylene glycol    Lab Data:  Recent Labs     05/20/22  0419 05/21/22  0512   WBC 7.4 6.7   HGB 10.6* 9.9*   HCT 30.4* 28.7*   MCV 94.2 94.6   PLT see below  -- Recent Labs     05/20/22  0419 05/21/22  0512    141   K 4.0 3.9    107   CO2 27 26   BUN 24* 21*   CREATININE 1.0 1.0     Recent Labs     05/20/22  1423 05/20/22  1903   TROPONINI 0.02* 0.02*       Coagulation:   Lab Results   Component Value Date    INR 2.99 05/20/2022     Cardiac markers:   Lab Results   Component Value Date    TROPONINI 0.02 05/20/2022         Lab Results   Component Value Date    ALT 15 05/20/2022    AST 18 05/20/2022    ALKPHOS 80 05/20/2022    BILITOT 0.9 05/20/2022       Lab Results   Component Value Date    INR 2.99 (H) 05/20/2022    INR 1.31 (H) 06/18/2021    INR 1.65 (H) 06/17/2021    PROTIME 35.4 (H) 05/20/2022    PROTIME 15.2 (H) 06/18/2021    PROTIME 19.2 (H) 06/17/2021       Radiology      CULTURES  Results for Lenda Clamp (MRN [id number]) as of 5/20/2022 13:33    Ref. Range 5/20/2022 08:06   SARS-CoV-2, NAAT Latest Ref Range: Not Detected  Not Detected         EKG:  I have reviewed the EKG with the following interpretation:   Demand pacemaker; interpretation is based on intrinsic rhythm  Wide QRS rhythm with occasional and consecutive Premature ventricular complexes  Baseline artifact  Abnormal ECG  No previous ECGs available  Confirmed by THERESA SALGADO MD ([id number]) on 5/20/2022 7:33:38 AM     RADIOLOGY  CT Head WO Contrast   Final Result   No acute intracranial abnormality.       Chronic generalized age-appropriate cortical atrophy with chronic   microvascular ischemic changes.           CT CHEST WO CONTRAST   Final Result   1. Patchy ground-glass infiltrates are seen within the lingula and left lower   lobe predominantly, though minimally within the right upper and lower lobe   which may represent asymmetric edema or multifocal pneumonia. 2. Mild cardiomegaly with indwelling cardiac leads as well as postoperative   change from prior TAVR. 3. Moderate hiatal hernia. 4. Diffuse atherosclerosis with coronary artery involvement.    5. No acute chest wall fracture or pneumothorax. 6. Moderate stool volume partially visualized in the upper abdomen suggesting   constipation.           XR SHOULDER RIGHT (MIN 2 VIEWS)   Final Result   Advanced degenerative changes seen within the right shoulder as well as   degenerative change in the right elbow, though no acute osseous fracture or   dislocation.       Peripheral arterial disease.           XR ELBOW RIGHT (MIN 3 VIEWS)   Final Result   Advanced degenerative changes seen within the right shoulder as well as   degenerative change in the right elbow, though no acute osseous fracture or   dislocation.       Peripheral arterial disease.           XR KNEE RIGHT (1-2 VIEWS)   Final Result   Bilateral tricompartmental osteoarthritis.       Bilateral meniscal calcification.       Small intra-articular body in the posterior joint space measuring 13 mm on   the right.       Small suprapatellar joint effusion on the left.       No acute fracture seen in the knees.           XR KNEE LEFT (1-2 VIEWS)   Final Result   Bilateral tricompartmental osteoarthritis.       Bilateral meniscal calcification.       Small intra-articular body in the posterior joint space measuring 13 mm on   the right.       Small suprapatellar joint effusion on the left.       No acute fracture seen in the knees.           XR CHEST PORTABLE   Final Result   Cardiomegaly with left basilar airspace disease and effusion.           CT Cervical Spine WO Contrast   Final Result   1. No acute cervical spine fracture. 2. Extensive multilevel degenerative changes seen within the cervical spine. 3. Numerous small radiolucencies are seen scattered through the osseous   structures, which could be related to underlying osteopenia, however other   etiologies such as multiple myeloma or metastatic disease are in the   differential.                 ASSESSMENT/PLAN:    #Fall  #Closed head injury, on Coumadin  -Patient unclear of how he fell, ? LOC, denies presyncope, lightheadedness or dizziness  -On AC and hit head, no acute bleed on head CT  -Hematoma on right side of forehead; monitor for increase in size  -Thankfully no acute bony injury on imaging  -Skin tear to right elbow; wound care consulted  -Check orthostatics  -PTOT  -CM for SNF placement     Abnormal cxr   -Patient does endorse intermittent episodes of dysphagia leading to choking and vomiting.  Has not occurred for some time.   -Imaging with possible pneumonitis - no symptoms of infection noted  -He is afebrile with not leukocytosis  -Unasyn will be dcd  -SLP eval and treat done and no issues noted  -      #HTN  -BP controlled  -Continue lisinopril  -Monitor     #Troponin elevation  -0.02--0.02--trend  -EKG without apparent ischemia  -No CP  -Do not suspect ACS     #Constipation  -Continue metamucil  -Glycolax PRN     #Incidental findings on CT   -Cervical CT with numerous small radiolucencies are seen scattered through the osseous   structures, which could be related to underlying osteopenia, however other   etiologies such as multiple myeloma or metastatic disease are in the   differential.   -Discussed with patient  -Recommended follow up with Oncologist as OP for recommendations     #Testicular hydrocele  -Right side; non-tender  -Has urology appt in June  -Recommend OP follow up with uro     #Right inguinal hernia; patient reported  -Could not appreciate on exam  -Suspect direct given symptoms  -Discussed options and red flag sxs  -Recommended support briefs and follow up with General surgery as needed     #CAD  #PAD  -On statin, no antiplatelet given blood disorders  -Chronic ulcer on left ankle previously seen by wound care  -Wound care for above      #Afib  #Complete AV block s/p pacemaker  #SA node dysfunction  #Aortic valve stenosis s/p TAVR  -Fw [facility] cardiology  -Monitor on tele  -Pharm to dose coumadin     #Anemia of chronic disease  -Hgb stable at baseline  -No s/s of active hemorrhage   -Fw Heme/Onc  -S/p Procrit injections     #Chronic Thrombocytopenia  #Myelodysplastic syndrome  #Hx of hairy cell leukemia, in remission  -Fw Heme/Onc     #Hx of skin neoplasm  -Sees dermatologist regularly     #Hx of splenectomy      DVT Prophylaxis: Coumadin- held today     Diet: ADULT DIET;  Regular; Low Fat/Low Chol/High Fiber/BENY  Code Status: DNR-CCA    Himanshu Nails MD, 5/21/2022 6:30 AM

## 2022-05-21 NOTE — FLOWSHEET NOTE
05/20/22 2049   Vital Signs   Temp 97.3 °F (36.3 °C)   Temp Source Oral   Pulse 61   Heart Rate Source Monitor   Resp 18   BP (!) 155/66   BP Location Right upper arm   MAP (Calculated) 95.67   Patient Position Semi fowlers   Level of Consciousness Alert (0)   MEWS Score 1   Pain Assessment   Pain Assessment 0-10   Pain Level 0   Opioid-Induced Sedation   POSS Score 1   RASS   Ford Agitation Sedation Scale (RASS) 0   Oxygen Therapy   SpO2 93 %     Patient A+Ox4, vitals and assessments done at this time. Bed in lowest position, call light within reach.

## 2022-05-21 NOTE — PROGRESS NOTES
Pt a/o x4. Am assessment completed see flow sheet. Pt requested raisin bran for breakfast, dietary called. No other needs voiced at this time. Call light within reach.

## 2022-05-21 NOTE — PROGRESS NOTES
Inpatient Occupational Therapy  Evaluation and Treatment    Unit: [facility]  Date:  5/21/2022  Patient Name:    Tiffany Fine  Admitting diagnosis: Thrombocytopenia ([address] [D69.6]  Anticoagulated [Z79.01]  Chronic anemia [D64.9]  Pulmonary infiltrates [R91.8]  Closed head injury, initial encounter [J45.55SM]  Fall, initial encounter [W19. XXXA]  Injury of right shoulder, initial encounter [S49.91XA]  Falls, initial encounter [C03. XXXA]  Elbow injury, right, initial encounter [S59.901A]  Skin tear of right elbow without complication, initial encounter [S51.011A]  Admit Date:  5/20/2022  Precautions/Restrictions/WB Status/ Lines/ Wounds/ Oxygen: Fall risk, Bed/chair alarm and Lines -IV; R elbow and knee wound, chronic circulatory issues in BLE    Treatment Time:  8688-1236  Treatment Number: 1     Billable Treatment Time: 10 minutes   Total Treatment Time:   20   minutes    Patient Goals for Therapy:  \" to go home \"      Discharge Recommendations: SNF  DME needs for discharge: needs met       Therapy recommendations for staff:  Assist of 2 with use of rolling walker (RW) and gait belt for all transfers sit<>stand from recliner chair (assist of 1 from [facility] of 1 from bed<>chair or BSC    History of Present Illness: Per H&P CHRISTINA Clay 5/20/22:  \"The patient is a 80 y. o. male with HTN, Afib, CAD, PAD, SA node dysfunction, Complete AV block s/p pacemaker, aortic valve stenosis s/p TAVR, anemia of chronic disease, thrombocytopenia, Myelodysplastic syndrome, hx of skin neoplasm, history of hairy cell leukemia, in remission and hx of splenectomy who presented to [facility] with complaint of fall.  Patient states that this morning around 4 AM he he woke up and was going to get out of his recliner to urinate.  States that he lost about a second and half of the time and before he knew it he was on the ground.  He does have an electric recliner that aids him standing up and states he may have been pushing the button to elevate him. He states he cannot piece together how he fell and does not believe that he had attempted to stand up but he did hit his head when he fell. Machelle Boyle is on Coumadin for anticoagulation.  He states that he has a fear of falling and is generally very careful around the home. Machelle Boyle is able to walk at baseline for short distances but typically will walk with a walker to his electric chair which is how he typically gets around the house. Machelle Boyle lives with his wife who has MS and states he typically helps her with things around the house. Peterson Romero have home health aides that come and help them during the week with showers and other ADLs.  He did not complain of any pain at this time apart from chronic pain in bilateral shoulders.  He denies feelings of presyncope, dizziness, chest pain, shortness of breath, nausea/vomiting/diarrhea, numbness/tingling/swelling.  His vitals are stable.  Labs are stable.  Imaging displays no acute bony abnormalities.  Chest x-ray displayed left basilar airspace disease and CT redemonstrated this and further classified as patchy groundglass infiltrates in the left lower lobe and lingula.  Also seen minimally within the right upper and lower lobe as well.  Incidentally cervical CT spine demonstrated numerous small radiolucency and seen scattered throughout osseous structures with a differential of osteopenia, multiple myeloma or metastatic disease.  He is admitted for further care. I did discuss CODE STATUS with the patient and he wishes to be DNR-CCA. \"    Home Health S4 Level Recommendation:  NA    AM-PAC Score: AM-PAC Inpatient Daily Activity Raw Score: 12  Pt scored a 12/24 on the AM-PAC ADL Inpatient form. Current research shows that an AM-PAC score of 17 or less is typically not associated with a discharge to the patient's home setting. Preadmission Environment    Pt.  Lives with spouse (has MS (slow progressive): confined to power wheelchair and recliner) - reports \"cold\" right now and feeling a little bit weaker but is still able to transfer self to and from wc and still drives however does use wc as primary mobility  Home environment:            one story home and condo  Steps to enter first floor: 1 steps to enter and ramp through garage  Steps to second floor:         N/A  Bathroom: walk in shower and grab bars, raised commode without HR  Equipment owned: RW, wc (power), shower chair/bench, lift chair, hospital bed, raised toilet and lifealert compression stockings for BLE for circulatory issues     Preadmission Status:  Pt. Able to drive: No (wife drives mobility van)  Pt Fully independent with ADLs: No -  SBA- CGA for shower transfers  Pt. Required assistance from family for: Cooking (wife), cleaning (private pay) a  Pt able to complete laundry tasks  Pt. independent for transfers and gait and walked with Ocapi Radha intermittently (room distance 2x/day) and power chair for longer distances (spends most of day in power chair)  History of falls          Yes  HHC +  OT only 1x/wk (PT has been discontinued due to plateau and independent with HEP); RN 2x/wk for wound care for dressing changes and monitoring  Has not been assigned health aide services yet    Pain:  Yes  Ratin  Location:  Shoulders, chronic   Pain Medicine Status:  RN notified      Cognition:    A&O x4, Person, Place, Time and Situation   Able to follow 2 step commands, consistently. Subjective:  Pt supine in bed upon therapist arrival. Pt agreeable to work with therapy this date.      Upper Extremity ROM/Strength:   Grossly impaired   B shoulder <90 degrees flexion   Full range for L elbow, <90 for R elbow flexion   Fair wrist mobility   Good digit dexterity     Upper Extremity Sensation:    WNL    Upper Extremity Proprioception:  NT    Coordination and Tone:  WFL    Balance:  Functional Sitting Balance:  NT   Comments:    Functional Standing Balance:NT   Comments:      Bed mobility:    Supine to sit:   Not Tested  Sit to supine:   Not Tested  Rolling:    moderate assistance (50%)  Scooting in sitting:  Not Tested  Scooting to head of bed:   maximal assistance (75%) and 2 people   Bridging:   No    Transfers:  Pt declined OOB activity. Has been up in chair today, reports it is difficult and would like to work from the bed. See PT note from this AM for more information. Sit to stand:  Not Tested  Stand to sit:  Not Tested  Bed to chair:   Not Tested  Standard toilet: Not Tested  Bed to [facility]:  Not Tested    Activities of Daily Living: Pt reports all needs met at this time. UB Dressing:   Not Tested  LB Dressing:    Not Tested  UB Bathing:  Not Tested  LB Bathing:  Not Tested  Feeding:  Not Tested  Grooming:   Not Tested  Toileting:  Not Tested    Activity Tolerance:   Pt completed therapy session with No adverse symptoms noted w/activity    Positioning Needs: In bed, call light and needs in reach. Exercise / Activities Initiated: All exercises completed bilaterally within available ROM. Hand flex/ext:  x10  Wrist flex/ext:  x10  Forearm sup/pronation:  x10  Elbow flex/ext  Shoulder flex/ext:  x10  Scapular retraction:  x10  Scapular protraction:  x10  Shoulder shrugs:  x10   IR/ER:  x10     Red theraband provided. Educated on HEP, pulling band apart in various planes. Pt demonstrated understanding. Patient/Family Education:   Role of OT  Recommendations for DC    Assessment of Deficits: Pt seen for Occupational therapy evaluation in acute care setting. Pt demonstrated decreased Activity tolerance, ADLs, IADLs, Balance , Bathing, Bed mobility, Dressing, ROM, Strength and Transfers. Pt functioning below baseline and will likely benefit from skilled occupational therapy services to maximize safety and independence. Goal(s): To be met in 3 Visits:  1). Bed to [facility]: minimal assistance (25%)    To be met in 5 Visits:  1). Supine to/from Sit:  minimal assistance (25%)  2).  Upper Body Bathing:   minimal assistance (25%)  3). Lower Body Bathing:   moderate assistance (50%)  4). Upper Body Dressing:  minimal assistance (25%)  5). Lower Body Dressing:  moderate assistance (50%)  6). Pt to demonstrate UE exs x 15 reps with minimal cues    Rehabilitation Potential:  Good for goals listed above. Strengths for achieving goals include: Pt motivated and Pt cooperative  Barriers to achieving goals include:  Complexity of condition     Plan: To be seen 3-5 x/wk while in acute care setting for strengthening, bed mobility, transfer training, family/patient education, ADL/IADL retraining and energy conservation.       Ruth Ann Adair, MOT, OTR/L   [id number]           If patient discharges from this facility prior to next visit, this note will serve as the Discharge Summary

## 2022-05-21 NOTE — CARE COORDINATION
INTERDISCIPLINARY PLAN OF CARE CONFERENCE    Date/Time: 5/21/2022 11:33 AM  Completed by: Shirley Moran RN, Case Management      Patient Name:  Binu Oviedo  YOB: 1931  Admitting Diagnosis: Thrombocytopenia ([address] [D69.6]  Anticoagulated [Z79.01]  Chronic anemia [D64.9]  Pulmonary infiltrates [R91.8]  Closed head injury, initial encounter [K78.41PJ]  Fall, initial encounter [W19. XXXA]  Injury of right shoulder, initial encounter [S49.91XA]  Falls, initial encounter [V03. XXXA]  Elbow injury, right, initial encounter [S59.901A]  Skin tear of right elbow without complication, initial encounter [S51.011A]     Admit Date/Time:  5/20/2022  3:42 AM    Chart reviewed. Interdisciplinary team contacted or reviewed plan related to patient progress and discharge plans. Disciplines included Case Management, Nursing, and Dietitian. Current Status:yen  PT/OT recommendation for discharge plan of care: SNF    Expected D/C Disposition:  Home  Confirmed plan with patient and/or family Yes confirmed with: (name) pt  Met with: pt  Discharge Plan Comments: Reviewed chart. Role of discharge planner explained and patient verbalized understanding. Pt is alert and oriented. Pt states that he helps take care of his wife who has MS. They are both in power chairs. Pt states richy the leaned over and fell out of the lift chair at home and richy tis why he is here. Pt is aware that PT/OT recommends SNF. Pt declines SNF. Pt states he has had a bad experience in the past and will not go back. Pt states he also helps take care of his wife who has MS. He states that she is olman  Power chair. Pt also uses a powerchair. Pt states that he is independent at home. Pt is active with [facility] and wants to resume services. Pt has PT/SN. Pt states OT left awhile ago. Pt will need [facility] frontloaded for PT/SN/and add OT/SW/HA at time of d/c for a S3 level.        Home O2 in place on admit: No  Pt informed of need to bring portable home O2 tank on day of discharge for nursing to connect prior to leaving:  Not Indicated  Verbalized agreement/Understanding:  Not Indicated

## 2022-05-21 NOTE — PLAN OF CARE
Problem: Skin/Tissue Integrity  Goal: Absence of new skin breakdown  Description: 1. Monitor for areas of redness and/or skin breakdown  2. Assess vascular access sites hourly  3. Every 4-6 hours minimum:  Change oxygen saturation probe site  4. Every 4-6 hours:  If on nasal continuous positive airway pressure, respiratory therapy assess nares and determine need for appliance change or resting period.   5/21/2022 0527 by Balbir Allison RN  Outcome: Progressing  5/20/2022 1807 by Justice Shankar RN  Outcome: Progressing     Problem: Discharge Planning  Goal: Discharge to home or other facility with appropriate resources  5/21/2022 0527 by Balbir Allison RN  Outcome: Progressing  5/20/2022 1807 by Justice Shankar RN  Outcome: Progressing     Problem: Pain  Goal: Verbalizes/displays adequate comfort level or baseline comfort level  5/21/2022 0527 by Balbir Allison RN  Outcome: Progressing  5/20/2022 1807 by Justice Shankar RN  Outcome: Progressing     Problem: Safety - Adult  Goal: Free from fall injury  5/21/2022 0527 by Balbir Allison RN  Outcome: Progressing  5/20/2022 1807 by Justice Shankar RN  Outcome: Progressing     Problem: ABCDS Injury Assessment  Goal: Absence of physical injury  5/21/2022 0527 by Balbir Allison RN  Outcome: Progressing  5/20/2022 1807 by Justice Shankar RN  Outcome: Progressing

## 2022-05-21 NOTE — PROGRESS NOTES
Inpatient Physical Therapy Evaluation and Treatment    Unit: [facility]  Date:  5/21/2022  Patient Name:    Victorina Jack  Admitting diagnosis: Thrombocytopenia ([address] [D69.6]  Anticoagulated [Z79.01]  Chronic anemia [D64.9]  Pulmonary infiltrates [R91.8]  Closed head injury, initial encounter [L07.75LA]  Fall, initial encounter [W19. XXXA]  Injury of right shoulder, initial encounter [S49.91XA]  Falls, initial encounter [Q98. XXXA]  Elbow injury, right, initial encounter [S59.901A]  Skin tear of right elbow without complication, initial encounter [S51.011A]  Admit Date:  5/20/2022  Precautions/Restrictions/WB Status/ Lines/ Wounds/ Oxygen: Fall risk, Bed/chair alarm and Lines -IV; R elbow and knee wound, chronic circulatory issues in BLE    Treatment Time:  950-1100  Treatment Number:  1   Timed Code Treatment Minutes: 60 minutes  Total Treatment Minutes:  70  minutes    Patient Goals for Therapy: \" to get walking again \"          Discharge Recommendations: SNF  DME needs for discharge: Needs Met       Therapy recommendation for EMS Transport: requires transport by cot due to due to significant kyphosis    Therapy recommendations for staff:   Assist of 2 with use of rolling walker (RW) and gait belt for all transfers sit<>stand from recliner chair (assist of 1 from [facility] 65 of 1 from bed<>chair or BSC    History of Present Illness:   Per H&P CHRISTINA Harrison 5/20/22:  \"The patient is a 80 y.o. male with HTN, Afib, CAD, PAD, SA node dysfunction, Complete AV block s/p pacemaker, aortic valve stenosis s/p TAVR, anemia of chronic disease, thrombocytopenia, Myelodysplastic syndrome, hx of skin neoplasm, history of hairy cell leukemia, in remission and hx of splenectomy who presented to [facility] ED with complaint of fall. Patient states that this morning around 4 AM he he woke up and was going to get out of his recliner to urinate.   States that he lost about a second and half of the time and before he knew it he was on the ground. He does have an electric recliner that aids him standing up and states he may have been pushing the button to elevate him. He states he cannot piece together how he fell and does not believe that he had attempted to stand up but he did hit his head when he fell. He is on Coumadin for anticoagulation. He states that he has a fear of falling and is generally very careful around the home. He is able to walk at baseline for short distances but typically will walk with a walker to his electric chair which is how he typically gets around the house. He lives with his wife who has MS and states he typically helps her with things around the house. They have home health aides that come and help them during the week with showers and other ADLs. He did not complain of any pain at this time apart from chronic pain in bilateral shoulders. He denies feelings of presyncope, dizziness, chest pain, shortness of breath, nausea/vomiting/diarrhea, numbness/tingling/swelling. His vitals are stable. Labs are stable. Imaging displays no acute bony abnormalities. Chest x-ray displayed left basilar airspace disease and CT redemonstrated this and further classified as patchy groundglass infiltrates in the left lower lobe and lingula. Also seen minimally within the right upper and lower lobe as well. Incidentally cervical CT spine demonstrated numerous small radiolucency and seen scattered throughout osseous structures with a differential of osteopenia, multiple myeloma or metastatic disease. He is admitted for further care. I did discuss CODE STATUS with the patient and he wishes to be DNR-CCA. \"    Per patient report 05/21/22  Reports normally sleeps in recliner recently (will also use hospital bed at times) due to \"feeling comfy at night\". Sometimes will lean forward onto knees and reports wife stated he fell asleep leaning forward and fell forward out of lift chair. Hit right side of body and head.  Has had difficulty with mobility since being admitted but would like to try to [facility] if I can walk\" today. Reports chronic B knee issues and wound issues in legs and tries to be as active as possible throughout day. Completed HEP from home PT regularly. Home Health S4 Level Recommendation:  Level 3 Safety (front loaded therapy if returning home) + HHA, OT and RN  AM-PAC Mobility Score    AM-PAC Inpatient Mobility Raw Score : 11       Preadmission Environment    Pt. Lives with spouse (has MS (slow progressive): confined to power wheelchair and recliner) - reports \"cold\" right now and feeling a little bit weaker but is still able to transfer self to and from wc and still drives however does use wc as primary mobility  Home environment:  one story home and condo  Steps to enter first floor: 1 steps to enter and ramp through garage  Steps to second floor: N/A  Bathroom: walk in shower and grab bars, raised commode without HR  Equipment owned: RW, wc (power), shower chair/bench, lift chair, hospital bed, raised toilet and lifealert compression stockings for BLE for circulatory issues    Preadmission Status:  Pt. Able to drive: No (wife drives mobility van)  Pt Fully independent with ADLs: No -  SBA- CGA for shower transfers  Pt.  Required assistance from family for: Cooking (wife), cleaning (private pay) a  Pt able to complete laundry tasks  Pt. independent for transfers and gait and walked with Rachel Keira intermittently (room distance 2x/day) and power chair for longer distances (spends most of day in power chair)  History of falls Yes  HHC +  OT only 1x/wk (PT has been discontinued due to plateau and independent with HEP); RN 2x/wk for wound care for dressing changes and monitoring  Has not been assigned health aide services yet    Pain   Yes  Location: B shoulders (chronic)  Ratin /10  Pain Medicine Status: No request made    Cognition    A&O Person, Place, Time and Situation   Able to follow 1 step commands    Subjective  Patient lying supine in bed with no family present. Pt agreeable to this PT eval & tx. Upper Extremity ROM/Strength  Please see OT evaluation. Lower Extremity ROM / Strength   AROM WFL: No  ROM limitations: limited in terminal knee extension bilaterally by approx 25%, limited in AROM hip flexion due to knee issues    Strength Assessment (measured on a 0-5 scale):  R LE   Quad   4-   Ant Tib  5   Hamstring 4-   Iliopsoas 4-  L LE  Quad   4-   Ant Tib  5   Hamstring 4-   Iliopsoas 4-    Lower Extremity Sensation    WFL    Lower Extremity Proprioception:   Impaired    Coordination and Tone  WFL     Balance  Sitting:  Poor; Min A   Comments: BLE and BUE support    Standing: Poor; Mod A   Comments: BUE and BLE support of walker    Bed Mobility   Supine to Sit:    Mod A   Sit to Supine:   Not Tested  Rolling: Mod A   Scooting in sitting: Mod A   Scooting in supine: Mod A     Transfer Training     Sit to stand:   Min A from raised bed and mod A from chair  Stand to sit:   Mod A : poor eccentric control and difficulty reaching back due  Bed to Chair:   CGA with use of gait belt and rolling walker (RW)    Gait gait completed as indicated below  Distance:      3 ft to chair + 5 ft forward and backward with slipper and altered walker height  Deviations (firm surface/linoleum):  decreased vito, increased JULIA, forward flexed posture, shuffles and antalgic pattern  Assistive Device Used:    gait belt and rolling walker (RW)  Level of Assist:    CGA  Comment: close guarding needed with complaints of weakness felt with weightbearing on L side     Stair Training deferred, pt unsafe/ not appropriate to complete stairs at this time    Activity Tolerance   Pt completed therapy session with Pain noted with with mobility    Positioning Needs   Pt up in chair, alarm set, positioned in proper neutral alignment and pressure relief provided.    Call light provided and all needs within reach      Other  None. Dependent for sock doffing and slipper donning due to pain with knee ROM bending forward    Patient/Family Education   Pt educated on role of inpatient PT, POC, importance of continued activity, DC recommendations, safety awareness, transfer techniques and calling for assist with mobility. Assessment  Pt seen for Physical Therapy evaluation in acute care setting. Pt demonstrated decreased Activity tolerance, Balance, ROM, Safety and Strength as well as decreased independence with Ambulation, Bed Mobility  and Transfers. Recommending SNF upon discharge as patient functioning well below baseline, demonstrates good rehab potential and unable to return home due to limited or no family support, burden of care beyond caregiver ability and home environment not conducive to patient recovery. Goals : To be met in 3 visits:  1). Independent with LE Ex x 10 reps    To be met in 6 visits:  1). Supine to/from sit: Min A   2). Sit to/from stand: CGA  3). Bed to chair: SBA  4). Gait: Ambulate 20 ft.   with  SBA and use of gait belt and rolling walker (RW)  5). Tolerate B LE exercises 3 sets of 10-15 reps    Rehabilitation Potential: Fair  Strengths for achieving goals include:   Pt motivated and Pt cooperative   Barriers to achieving goals include:    Complexity of condition, Pain and Weakness    Plan    To be seen 5x / week  while in acute care setting for therapeutic exercises, bed mobility, transfers, progressive gait training, balance training, and family/patient education. Signature: Anastasia Adams, PT, DPT, OMT-C #[id number]      If patient discharges from this facility prior to next visit, this note will serve as the Discharge Summary.

## 2022-05-22 LAB
ANION GAP SERPL CALCULATED.3IONS-SCNC: 9 MMOL/L (ref 3–16)
BASOPHILS ABSOLUTE: 0 K/UL (ref 0–0.2)
BASOPHILS RELATIVE PERCENT: 0.6 %
BUN BLDV-MCNC: 22 MG/DL (ref 7–20)
CALCIUM SERPL-MCNC: 8.1 MG/DL (ref 8.3–10.6)
CHLORIDE BLD-SCNC: 105 MMOL/L (ref 99–110)
CO2: 25 MMOL/L (ref 21–32)
CREAT SERPL-MCNC: 1.1 MG/DL (ref 0.8–1.3)
EOSINOPHILS ABSOLUTE: 0 K/UL (ref 0–0.6)
EOSINOPHILS RELATIVE PERCENT: 0.1 %
GFR AFRICAN AMERICAN: >60
GFR NON-AFRICAN AMERICAN: >60
GLUCOSE BLD-MCNC: 85 MG/DL (ref 70–99)
HCT VFR BLD CALC: 28.4 % (ref 40.5–52.5)
HEMOGLOBIN: 9.5 G/DL (ref 13.5–17.5)
LYMPHOCYTES ABSOLUTE: 2 K/UL (ref 1–5.1)
LYMPHOCYTES RELATIVE PERCENT: 28.9 %
MAGNESIUM: 1.8 MG/DL (ref 1.8–2.4)
MCH RBC QN AUTO: 31.5 PG (ref 26–34)
MCHC RBC AUTO-ENTMCNC: 33.5 G/DL (ref 31–36)
MCV RBC AUTO: 94.2 FL (ref 80–100)
MONOCYTES ABSOLUTE: 0.8 K/UL (ref 0–1.3)
MONOCYTES RELATIVE PERCENT: 11.2 %
NEUTROPHILS ABSOLUTE: 4.1 K/UL (ref 1.7–7.7)
NEUTROPHILS RELATIVE PERCENT: 59.2 %
PDW BLD-RTO: 15.7 % (ref 12.4–15.4)
PLATELET # BLD: 45 K/UL (ref 135–450)
PMV BLD AUTO: 15.9 FL (ref 5–10.5)
POTASSIUM REFLEX MAGNESIUM: 3.5 MMOL/L (ref 3.5–5.1)
RBC # BLD: 3.02 M/UL (ref 4.2–5.9)
SODIUM BLD-SCNC: 139 MMOL/L (ref 136–145)
WBC # BLD: 7 K/UL (ref 4–11)

## 2022-05-22 PROCEDURE — 36415 COLL VENOUS BLD VENIPUNCTURE: CPT

## 2022-05-22 PROCEDURE — 83735 ASSAY OF MAGNESIUM: CPT

## 2022-05-22 PROCEDURE — G0378 HOSPITAL OBSERVATION PER HR: HCPCS

## 2022-05-22 PROCEDURE — 99225 PR SBSQ OBSERVATION CARE/DAY 25 MINUTES: CPT | Performed by: INTERNAL MEDICINE

## 2022-05-22 PROCEDURE — 2580000003 HC RX 258: Performed by: NURSE PRACTITIONER

## 2022-05-22 PROCEDURE — 6370000000 HC RX 637 (ALT 250 FOR IP): Performed by: NURSE PRACTITIONER

## 2022-05-22 PROCEDURE — 85025 COMPLETE CBC W/AUTO DIFF WBC: CPT

## 2022-05-22 PROCEDURE — 96361 HYDRATE IV INFUSION ADD-ON: CPT

## 2022-05-22 PROCEDURE — 80048 BASIC METABOLIC PNL TOTAL CA: CPT

## 2022-05-22 RX ORDER — ATORVASTATIN CALCIUM 40 MG/1
40 TABLET, FILM COATED ORAL DAILY
Status: DISCONTINUED | OUTPATIENT
Start: 2022-05-22 | End: 2022-05-27 | Stop reason: HOSPADM

## 2022-05-22 RX ADMIN — LISINOPRIL 40 MG: 20 TABLET ORAL at 09:36

## 2022-05-22 RX ADMIN — SODIUM CHLORIDE, PRESERVATIVE FREE 10 ML: 5 INJECTION INTRAVENOUS at 09:38

## 2022-05-22 RX ADMIN — SODIUM CHLORIDE, PRESERVATIVE FREE 10 ML: 5 INJECTION INTRAVENOUS at 22:58

## 2022-05-22 RX ADMIN — ATORVASTATIN CALCIUM 40 MG: 40 TABLET, FILM COATED ORAL at 17:57

## 2022-05-22 RX ADMIN — FINASTERIDE 5 MG: 5 TABLET, FILM COATED ORAL at 09:36

## 2022-05-22 RX ADMIN — PSYLLIUM HUSK 1 PACKET: 3.4 POWDER ORAL at 12:48

## 2022-05-22 ASSESSMENT — PAIN SCALES - GENERAL
PAINLEVEL_OUTOF10: 2
PAINLEVEL_OUTOF10: 0

## 2022-05-22 ASSESSMENT — PAIN DESCRIPTION - ORIENTATION: ORIENTATION: RIGHT;LEFT

## 2022-05-22 ASSESSMENT — PAIN DESCRIPTION - DESCRIPTORS: DESCRIPTORS: ACHING

## 2022-05-22 ASSESSMENT — PAIN DESCRIPTION - PAIN TYPE: TYPE: CHRONIC PAIN

## 2022-05-22 NOTE — PLAN OF CARE
Problem: Skin/Tissue Integrity  Goal: Absence of new skin breakdown  Description: 1. Monitor for areas of redness and/or skin breakdown  2. Assess vascular access sites hourly  3. Every 4-6 hours minimum:  Change oxygen saturation probe site  4. Every 4-6 hours:  If on nasal continuous positive airway pressure, respiratory therapy assess nares and determine need for appliance change or resting period.      Problem: Discharge Planning  Goal: Discharge to home or other facility with appropriate resources  5/22/2022 1254 by Cristina Harris RN  Outcome: Progressing  Problem: Safety - Adult  Goal: Free from fall injury  5/22/2022 1254 by Cristina Harris RN  Outcome: Progressing  Flowsheets  Taken 5/22/2022 1253 by Cristina Harris RN  Free From Fall Injury: Instruct family/caregiver on patient safety  Taken 5/21/2022 0991 by Maite Simpson RN  Free From Fall Injury: Instruct family/caregiver on patient safety

## 2022-05-22 NOTE — PROGRESS NOTES
Bedside Mobility Assessment Tool (BMAT):     Assessment Level 1- Sit and Shake    1. From a semi-reclined position, ask patient to sit up and rotate to a seated position at the side of the bed. Can use the bedrail. 2. Ask patient to reach out and grab your hand and shake making sure patient reaches across his/her midline. Pass- Patient is able to come to a seated position, maintain core strength. Maintains seated balance while reaching across midline. Move on to Assessment Level 2. Assessment Level 2- Stretch and Point   1. With patient in seated position at the side of the bed, have patient place both feet on the floor (or stool) with knees no higher than hips. 2. Ask patient to stretch one leg and straighten the knee, then bend the ankle/flex and point the toes. If appropriate, repeat with the other leg. Pass- Patient is able to demonstrate appropriate quad strength on intended weight bearing limb(s). Move onto Assessment Level 3. Assessment Level 3- Stand   1. Ask patient to elevate off the bed or chair (seated to standing) using an assistive device (cane, bedrail). 2. Patient should be able to raise buttocks off be and hold for a count of five. May repeat once. Fail- Patient unable to demonstrate standing stability. Patient is MOBILITY LEVEL 3. Assessment Level 4- Walk   1. Ask patient to march in place at bedside. 2. Then ask patient to advance step and return each foot. Some medical conditions may render a patient from stepping backwards, use your best clinical judgement. Fail- Patient not able to complete tasks OR requires use of assistive device. Patient is MOBILITY LEVEL 3. Mobility Level- 3     Patient is not able to demonstrate the ability to move from a reclining position to an upright position within the recliner due to weak .

## 2022-05-22 NOTE — PROGRESS NOTES
Changed patients dressing on right arm akin tear, had some bleeding discharge. Cleaned with saline gauze, placed xeroform, gauze and kerlex. Patient tolerated well.

## 2022-05-22 NOTE — FLOWSHEET NOTE
05/22/22 0933   Vitals   Temp 96.7 °F (35.9 °C)   Temp Source Axillary   Pulse 72   Heart Rate Source Monitor   Resp 20   BP (!) 152/78   BP Location Left upper arm   BP Upper/Lower Upper   BP Method Automatic   Level of Consciousness Alert (0)   MEWS Score 1   Pain Assessment   Pain Assessment 0-10   Pain Level 2   Pain Orientation Right;Left   Pain Descriptors Aching   Pain Type Chronic pain   Non-Pharmaceutical Pain Intervention(s) Rest;Declines   Shift assessment complete. Scheduled med's given. See MAR. Patients head-toe complete, VS are logged. Pt C/O chronic pain to bilat shoulders 2/10 . Refused med's resting at this time. The bed is locked and is in the lowest position. Call light and bedside table are within reach.

## 2022-05-22 NOTE — PROGRESS NOTES
IM Progress Note    Admit Date:  5/20/2022  0    Interval history:  fall    Subjective:  Mr. Latonya Simpson seen sitting up in chair ,feels good today except ,  No further bleeding from elbow wounds  Worked with PT and recommends SNF but pt prefers home  Has motorized scooter at home    Objective:   BP (!) 156/81   Pulse 68   Temp 97.1 °F (36.2 °C) (Oral)   Resp 18   Ht 5' 9\" (1.753 m)   Wt 171 lb (77.6 kg)   SpO2 96%   BMI 25.25 kg/m²       Intake/Output Summary (Last 24 hours) at 5/22/2022 7485  Last data filed at 5/22/2022 0459  Gross per 24 hour   Intake --   Output 375 ml   Net -375 ml       Physical Exam:        General:  Elderly male up in chair  HEENT - right frontal hematoma noted with surrounding ecchymoses  Awake, alert and oriented. Appears to be not in any distress  Mucous Membranes:  Pink , anicteric  Neck: No JVD, no carotid bruit, no thyromegaly  Chest:  Clear to auscultation bilaterally, no added sounds  Cardiovascular:  RRR S1S2 heard, no murmurs or gallops  Abdomen:  Soft, undistended, non tender, no organomegaly, BS present  Extremities: large dressing to elbow  , dry dressing to right knee  Chronic skin changes and purple discoloration to both ankles    No edema or cyanosis.  Distal pulses well felt  Neurological : grossly normal        Medications:   Scheduled Medications:    sodium chloride flush  5-40 mL IntraVENous 2 times per day    atorvastatin  40 mg Oral Daily    finasteride  5 mg Oral Daily    lisinopril  40 mg Oral Daily    therapeutic multivitamin-minerals  1 tablet Oral Daily    psyllium  1 packet Oral Daily     I   sodium chloride 250 mL (05/21/22 0243)     sodium chloride flush, sodium chloride, ondansetron **OR** ondansetron, acetaminophen **OR** acetaminophen, polyethylene glycol    Lab Data:  Recent Labs     05/20/22 0419 05/21/22  0527   WBC 7.4 6.7   HGB 10.6* 9.9*   HCT 30.4* 28.7*   MCV 94.2 94.6   PLT see below 47*     Recent Labs     05/20/22 0419 05/21/22  8909  141   K 4.0 3.9    107   CO2 27 26   BUN 24* 21*   CREATININE 1.0 1.0     Recent Labs     05/20/22  1423 05/20/22  1903   TROPONINI 0.02* 0.02*       Coagulation:   Lab Results   Component Value Date    INR 2.99 05/20/2022     Cardiac markers:   Lab Results   Component Value Date    TROPONINI 0.02 05/20/2022         Lab Results   Component Value Date    ALT 15 05/20/2022    AST 18 05/20/2022    ALKPHOS 80 05/20/2022    BILITOT 0.9 05/20/2022       Lab Results   Component Value Date    INR 2.99 (H) 05/20/2022    INR 1.31 (H) 06/18/2021    INR 1.65 (H) 06/17/2021    PROTIME 35.4 (H) 05/20/2022    PROTIME 15.2 (H) 06/18/2021    PROTIME 19.2 (H) 06/17/2021       Radiology      CULTURES  Results for Myna Ilan (MRN [id number]) as of 5/20/2022 13:33    Ref. Range 5/20/2022 08:06   SARS-CoV-2, NAAT Latest Ref Range: Not Detected  Not Detected         EKG:  I have reviewed the EKG with the following interpretation:   Demand pacemaker; interpretation is based on intrinsic rhythm  Wide QRS rhythm with occasional and consecutive Premature ventricular complexes  Baseline artifact  Abnormal ECG  No previous ECGs available       RADIOLOGY    CT Head WO Contrast   Final Result   No acute intracranial abnormality.       Chronic generalized age-appropriate cortical atrophy with chronic   microvascular ischemic changes.           CT CHEST WO CONTRAST   Final Result   1. Patchy ground-glass infiltrates are seen within the lingula and left lower   lobe predominantly, though minimally within the right upper and lower lobe   which may represent asymmetric edema or multifocal pneumonia. 2. Mild cardiomegaly with indwelling cardiac leads as well as postoperative   change from prior TAVR. 3. Moderate hiatal hernia. 4. Diffuse atherosclerosis with coronary artery involvement. 5. No acute chest wall fracture or pneumothorax.    6. Moderate stool volume partially visualized in the upper abdomen suggesting constipation.           XR SHOULDER RIGHT (MIN 2 VIEWS)   Final Result   Advanced degenerative changes seen within the right shoulder as well as   degenerative change in the right elbow, though no acute osseous fracture or   dislocation.       Peripheral arterial disease.           XR ELBOW RIGHT (MIN 3 VIEWS)   Final Result   Advanced degenerative changes seen within the right shoulder as well as   degenerative change in the right elbow, though no acute osseous fracture or   dislocation.       Peripheral arterial disease.           XR KNEE RIGHT (1-2 VIEWS)   Final Result   Bilateral tricompartmental osteoarthritis.       Bilateral meniscal calcification.       Small intra-articular body in the posterior joint space measuring 13 mm on   the right.       Small suprapatellar joint effusion on the left.       No acute fracture seen in the knees.           XR KNEE LEFT (1-2 VIEWS)   Final Result   Bilateral tricompartmental osteoarthritis.       Bilateral meniscal calcification.       Small intra-articular body in the posterior joint space measuring 13 mm on   the right.       Small suprapatellar joint effusion on the left.       No acute fracture seen in the knees.           XR CHEST PORTABLE   Final Result   Cardiomegaly with left basilar airspace disease and effusion.           CT Cervical Spine WO Contrast   Final Result   1. No acute cervical spine fracture. 2. Extensive multilevel degenerative changes seen within the cervical spine. 3. Numerous small radiolucencies are seen scattered through the osseous   structures, which could be related to underlying osteopenia, however other   etiologies such as multiple myeloma or metastatic disease are in the   differential.                 ASSESSMENT/PLAN:    #Fall  #Closed head injury, on Coumadin  -Patient unclear of how he fell, ? LOC, denies presyncope, lightheadedness or dizziness  -On AC and hit head, no acute bleed on head CT  -Hematoma on right side of forehead; monitor for increase in size  -Thankfully no acute bony injury on imaging  -Skin tear to right elbow; wound care consulted  -PTOT recommends SNF placement  - pt prefers to go home  -ok to dc      Abnormal cxr   -Patient does endorse intermittent episodes of dysphagia leading to choking and vomiting.  Has not occurred for some time.   -Imaging with possible pneumonitis - no symptoms of infection noted  -He is afebrile with not leukocytosis  -Unasyn will be dcd  -SLP eval and treat done and no issues noted  -      #HTN  -BP controlled  -Continue lisinopril  -Monitor     #Troponin elevation  -0.02--0.02--trend  -EKG without apparent ischemia  -No CP  -Do not suspect ACS     #Constipation  -Continue metamucil  -Glycolax PRN     #Incidental findings on CT   -Cervical CT with numerous small radiolucencies are seen scattered through the osseous   structures, which could be related to underlying osteopenia, however other   etiologies such as multiple myeloma or metastatic disease are in the   differential.   -Discussed with patient  -Recommended follow up with Oncologist as OP for recommendations     #Testicular hydrocele  -Right side; non-tender  -Has urology appt in June  -Recommend OP follow up with uro     #Right inguinal hernia; patient reported  -Could not appreciate on exam  -Suspect direct given symptoms  -Discussed options and red flag sxs  -Recommended support briefs and follow up with General surgery as needed     #CAD  #PAD  -On statin, no antiplatelet given blood disorders  -Chronic ulcer on left ankle previously seen by wound care  -Wound care for above      #Afib  #Complete AV block s/p pacemaker  #SA node dysfunction  #Aortic valve stenosis s/p TAVR  -Fw [facility] cardiology  -Monitor on tele  -Pharm to dose coumadin     #Anemia of chronic disease  -Hgb stable at baseline  -No s/s of active hemorrhage   -Fw Heme/Onc  -S/p Procrit injections     #Chronic Thrombocytopenia  #Myelodysplastic syndrome  #Hx of hairy cell leukemia, in remission  -Fw Heme/Onc     #Hx of skin neoplasm  -Sees dermatologist regularly     #Hx of splenectomy      DVT Prophylaxis: Coumadin- held today     Diet: ADULT DIET;  Regular; Low Fat/Low Chol/High Fiber/BENY  Code Status: DNR-CCA    Dipti Salas MD, 5/22/2022 6:23 AM

## 2022-05-22 NOTE — FLOWSHEET NOTE
05/21/22 2000   Vital Signs   Temp 97 °F (36.1 °C)   Temp Source Oral   Pulse 63   Heart Rate Source Monitor   Resp 18   BP (!) 144/69   BP Location Left upper arm   MAP (Calculated) 94   Patient Position Semi fowlers   Level of Consciousness Alert (0)   MEWS Score 1   Pain Assessment   Pain Assessment None - Denies Pain   Pain Level 0   Opioid-Induced Sedation   POSS Score 1   RASS   Ford Agitation Sedation Scale (RASS) 0   Oxygen Therapy   SpO2 96 %   Pulse Oximeter Device Mode Intermittent   Pulse Oximeter Device Location Finger;Right   O2 Device None (Room air)     Patient A+Ox4, vitals and assessments done at this time. Bed in lowest position, call light within reach.

## 2022-05-23 PROCEDURE — 92526 ORAL FUNCTION THERAPY: CPT

## 2022-05-23 PROCEDURE — 99225 PR SBSQ OBSERVATION CARE/DAY 25 MINUTES: CPT | Performed by: INTERNAL MEDICINE

## 2022-05-23 PROCEDURE — 6370000000 HC RX 637 (ALT 250 FOR IP): Performed by: NURSE PRACTITIONER

## 2022-05-23 PROCEDURE — G0378 HOSPITAL OBSERVATION PER HR: HCPCS

## 2022-05-23 PROCEDURE — 97116 GAIT TRAINING THERAPY: CPT

## 2022-05-23 PROCEDURE — 2580000003 HC RX 258: Performed by: NURSE PRACTITIONER

## 2022-05-23 PROCEDURE — 97530 THERAPEUTIC ACTIVITIES: CPT

## 2022-05-23 PROCEDURE — 97110 THERAPEUTIC EXERCISES: CPT

## 2022-05-23 RX ADMIN — ATORVASTATIN CALCIUM 40 MG: 40 TABLET, FILM COATED ORAL at 17:06

## 2022-05-23 RX ADMIN — FINASTERIDE 5 MG: 5 TABLET, FILM COATED ORAL at 09:16

## 2022-05-23 RX ADMIN — SODIUM CHLORIDE, PRESERVATIVE FREE 5 ML: 5 INJECTION INTRAVENOUS at 09:19

## 2022-05-23 RX ADMIN — SODIUM CHLORIDE, PRESERVATIVE FREE 10 ML: 5 INJECTION INTRAVENOUS at 20:18

## 2022-05-23 RX ADMIN — LISINOPRIL 40 MG: 20 TABLET ORAL at 09:16

## 2022-05-23 ASSESSMENT — PAIN SCALES - GENERAL: PAINLEVEL_OUTOF10: 2

## 2022-05-23 NOTE — CARE COORDINATION
INTERDISCIPLINARY PLAN OF CARE CONFERENCE    Date/Time: 5/23/2022 3:35 PM  Completed by: Eladia Galeas RN, Case Management      Patient Name:  Tiffany Fine  YOB: 1931  Admitting Diagnosis: Thrombocytopenia ([address] [D69.6]  Anticoagulated [Z79.01]  Chronic anemia [D64.9]  Pulmonary infiltrates [R91.8]  Closed head injury, initial encounter [Y99.26BD]  Fall, initial encounter [W19. XXXA]  Injury of right shoulder, initial encounter [S49.91XA]  Falls, initial encounter [F48. XXXA]  Elbow injury, right, initial encounter [S59.901A]  Skin tear of right elbow without complication, initial encounter [S51.011A]     Admit Date/Time:  5/20/2022  3:42 AM    Chart reviewed. Interdisciplinary team contacted or reviewed plan related to patient progress and discharge plans. Disciplines included Case Management, Nursing, and Dietitian. Current Status: Stable  PT/OT recommendation for discharge plan of care: SNF    Expected D/C Disposition:  Rehab  Confirmed plan with patient and/or family Yes confirmed with: (name) wife  Met with: patient  Discharge Plan Comments:  Reviewed chart and order for dc noted. Spoke with pt who cont plan for home. But request to see therapy before dc. Spoke with pt after working with therapy and discussed need for rehab. Pt cont to decline but states will discuss with wife. Pt talked with wife in room with CM and after conversation with wife states will agree to rehab and 1st choice is [facility]. Referral called to [facility] and wait return call. Will follow. Spoke with [facility] at [facility] who is not in network wi [address]. Will call wife in AM for additional choices. Spoke with Ari Cardona at [facility] who is in network but has no bed available.       Home O2 in place on admit: No  Pt informed of need to bring portable home O2 tank on day of discharge for nursing to connect prior to leaving:  Not Indicated  Verbalized agreement/Understanding:  Not Indicated

## 2022-05-23 NOTE — PROGRESS NOTES
IM Progress Note    Admit Date:  5/20/2022  0    Interval history:  fall    Subjective:  Mr. Eunice Bonilla seen sitting up in bed today . feels ok today   No further bleeding from elbow wounds  Worked with PT and recommends SNF but pt prefers home  Has motorized scooter at home    Objective:     BP (!) 152/74   Pulse 67   Temp 98.3 °F (36.8 °C) (Oral)   Resp 18   Ht 5' 9\" (1.753 m)   Wt 168 lb 9.6 oz (76.5 kg)   SpO2 97%   BMI 24.90 kg/m²       Intake/Output Summary (Last 24 hours) at 5/23/2022 8923  Last data filed at 5/23/2022 0554  Gross per 24 hour   Intake --   Output 1325 ml   Net -1325 ml       Physical Exam:    General:  Elderly male up in chair  HEENT - right frontal hematoma noted with surrounding ecchymoses  Awake, alert and oriented. Appears to be not in any distress  Mucous Membranes:  Pink , anicteric  Neck: No JVD, no carotid bruit, no thyromegaly  Chest:  Clear to auscultation bilaterally, no added sounds  Cardiovascular:  RRR S1S2 heard, no murmurs or gallops  Abdomen:  Soft, undistended, non tender, no organomegaly, BS present  Extremities: large dressing to elbow  , dry dressing to right knee  Chronic skin changes and purple discoloration to both ankles    No edema or cyanosis.  Distal pulses well felt  Neurological : grossly normal        Medications:   Scheduled Medications:    psyllium  1 packet Oral Daily    atorvastatin  40 mg Oral Daily    sodium chloride flush  5-40 mL IntraVENous 2 times per day    finasteride  5 mg Oral Daily    lisinopril  40 mg Oral Daily    therapeutic multivitamin-minerals  1 tablet Oral Daily     I   sodium chloride 250 mL (05/21/22 4773)     sodium chloride flush, sodium chloride, ondansetron **OR** ondansetron, acetaminophen **OR** acetaminophen, polyethylene glycol    Lab Data:  Recent Labs     05/21/22 0512 05/22/22  0555   WBC 6.7 7.0   HGB 9.9* 9.5*   HCT 28.7* 28.4*   MCV 94.6 94.2   PLT 47* 45*     Recent Labs     05/21/22 0512 05/22/22  0555   NA 141 139   K 3.9 3.5    105   CO2 26 25   BUN 21* 22*   CREATININE 1.0 1.1     Recent Labs     05/20/22  1423 05/20/22  1903   TROPONINI 0.02* 0.02*       Coagulation:   Lab Results   Component Value Date    INR 2.99 05/20/2022     Cardiac markers:   Lab Results   Component Value Date    TROPONINI 0.02 05/20/2022         Lab Results   Component Value Date    ALT 15 05/20/2022    AST 18 05/20/2022    ALKPHOS 80 05/20/2022    BILITOT 0.9 05/20/2022       Lab Results   Component Value Date    INR 2.99 (H) 05/20/2022    INR 1.31 (H) 06/18/2021    INR 1.65 (H) 06/17/2021    PROTIME 35.4 (H) 05/20/2022    PROTIME 15.2 (H) 06/18/2021    PROTIME 19.2 (H) 06/17/2021       Radiology      CULTURES  Results for Alber Otero (MRN [id number]) as of 5/20/2022 13:33    Ref. Range 5/20/2022 08:06   SARS-CoV-2, NAAT Latest Ref Range: Not Detected  Not Detected         EKG:  I have reviewed the EKG with the following interpretation:   Demand pacemaker; interpretation is based on intrinsic rhythm  Wide QRS rhythm with occasional and consecutive Premature ventricular complexes  Baseline artifact  Abnormal ECG  No previous ECGs available       RADIOLOGY    CT Head WO Contrast   Final Result   No acute intracranial abnormality.       Chronic generalized age-appropriate cortical atrophy with chronic   microvascular ischemic changes.           CT CHEST WO CONTRAST   Final Result   1. Patchy ground-glass infiltrates are seen within the lingula and left lower   lobe predominantly, though minimally within the right upper and lower lobe   which may represent asymmetric edema or multifocal pneumonia. 2. Mild cardiomegaly with indwelling cardiac leads as well as postoperative   change from prior TAVR. 3. Moderate hiatal hernia. 4. Diffuse atherosclerosis with coronary artery involvement. 5. No acute chest wall fracture or pneumothorax.    6. Moderate stool volume partially visualized in the upper abdomen suggesting constipation.           XR SHOULDER RIGHT (MIN 2 VIEWS)   Final Result   Advanced degenerative changes seen within the right shoulder as well as   degenerative change in the right elbow, though no acute osseous fracture or   dislocation.       Peripheral arterial disease.           XR ELBOW RIGHT (MIN 3 VIEWS)   Final Result   Advanced degenerative changes seen within the right shoulder as well as   degenerative change in the right elbow, though no acute osseous fracture or   dislocation.       Peripheral arterial disease.           XR KNEE RIGHT (1-2 VIEWS)   Final Result   Bilateral tricompartmental osteoarthritis.       Bilateral meniscal calcification.       Small intra-articular body in the posterior joint space measuring 13 mm on   the right.       Small suprapatellar joint effusion on the left.       No acute fracture seen in the knees.           XR KNEE LEFT (1-2 VIEWS)   Final Result   Bilateral tricompartmental osteoarthritis.       Bilateral meniscal calcification.       Small intra-articular body in the posterior joint space measuring 13 mm on   the right.       Small suprapatellar joint effusion on the left.       No acute fracture seen in the knees.           XR CHEST PORTABLE   Final Result   Cardiomegaly with left basilar airspace disease and effusion.           CT Cervical Spine WO Contrast   Final Result   1. No acute cervical spine fracture. 2. Extensive multilevel degenerative changes seen within the cervical spine. 3. Numerous small radiolucencies are seen scattered through the osseous   structures, which could be related to underlying osteopenia, however other   etiologies such as multiple myeloma or metastatic disease are in the   differential.                 ASSESSMENT/PLAN:    #Fall  #Closed head injury, on Coumadin  -Patient unclear of how he fell, ? LOC, denies presyncope, lightheadedness or dizziness  -On AC and hit head, no acute bleed on head CT  -Hematoma on right side of forehead; monitor for increase in size  -Thankfully no acute bony injury on imaging  -Skin tear to right elbow; wound care consulted  -PTOT recommends SNF placement  - pt prefers to go home  -ok to dc      Abnormal cxr   -Patient does endorse intermittent episodes of dysphagia leading to choking and vomiting.  Has not occurred for some time.   -Imaging with possible pneumonitis - no symptoms of infection noted  -He is afebrile with not leukocytosis  -Unasyn will be dcd  -SLP eval and treat done and no issues noted  -      #HTN  -BP controlled  -Continue lisinopril  -Monitor     #Troponin elevation  -0.02--0.02--trend  -EKG without apparent ischemia  -No CP  -Do not suspect ACS     #Constipation  -Continue metamucil  -Glycolax PRN     #Incidental findings on CT   -Cervical CT with numerous small radiolucencies are seen scattered through the osseous   structures, which could be related to underlying osteopenia, however other   etiologies such as multiple myeloma or metastatic disease are in the   differential.   -Discussed with patient  -Recommended follow up with Oncologist as OP for recommendations     #Testicular hydrocele  -Right side; non-tender  -Has urology appt in June  -Recommend OP follow up with uro     #Right inguinal hernia; patient reported  -Could not appreciate on exam  -Suspect direct given symptoms  -Discussed options and red flag sxs  -Recommended support briefs and follow up with General surgery as needed     #CAD  #PAD  -On statin, no antiplatelet given blood disorders  -Chronic ulcer on left ankle previously seen by wound care  -Wound care for above      #Afib  #Complete AV block s/p pacemaker  #SA node dysfunction  #Aortic valve stenosis s/p TAVR  -Fw [facility] cardiology  -Monitor on tele  -Pharm to dose coumadin     #Anemia of chronic disease  -Hgb stable at baseline  -No s/s of active hemorrhage   -Fw Heme/Onc  -S/p Procrit injections     #Chronic Thrombocytopenia  #Myelodysplastic

## 2022-05-23 NOTE — PROGRESS NOTES
Inpatient Physical Therapy Daily Treatment Note    Unit: [address]  Date:  5/23/2022  Patient Name:    Wilver Marino  Admitting diagnosis: Thrombocytopenia ([facility] [address] [D69.6]  Anticoagulated [Z79.01]  Chronic anemia [D64.9]  Pulmonary infiltrates [R91.8]  Closed head injury, initial encounter [O13.26FT]  Fall, initial encounter [W19. XXXA]  Injury of right shoulder, initial encounter [S49.91XA]  Falls, initial encounter [R24. XXXA]  Elbow injury, right, initial encounter [S59.901A]  Skin tear of right elbow without complication, initial encounter [S51.011A]  Admit Date:  5/20/2022  Precautions/Restrictions:  Fall risk, Bed/chair alarm, Lines -IV, Telemetry and R elbow and knee wound, chronic circulatory issues in BLE      Discharge Recommendations: SNF (pt adamantly refusing SNF. Pt does not have 24h care at home and pt's spouse is WC bound at baseline)  DME needs for discharge: Needs Met and defer to facility       Therapy recommendation for EMS Transport: can transport by wheelchair    Therapy recommendations for staff:   Assist of 1 with use of rolling walker (RW) and gait belt for all transfers and ambulation to/from [facility]  to/from chair    History of Present Illness:   Per H&P CHRISTINA Wood 5/20/22:  \"The patient is a 80 y. o. male with HTN, Afib, CAD, PAD, SA node dysfunction, Complete AV block s/p pacemaker, aortic valve stenosis s/p TAVR, anemia of chronic disease, thrombocytopenia, Myelodysplastic syndrome, hx of skin neoplasm, history of hairy cell leukemia, in remission and hx of splenectomy who presented to [facility] with complaint of fall.  Patient states that this morning around 4 AM he he woke up and was going to get out of his recliner to urinate.  States that he lost about a second and half of the time and before he knew it he was on the ground. He does have an electric recliner that aids him standing up and states he may have been pushing the button to elevate him.  He states he cannot piece together how he fell and does not believe that he had attempted to stand up but he did hit his head when he fell. Machelle Boyle is on Coumadin for anticoagulation. Machelle Boyle states that he has a fear of falling and is generally very careful around the home. Machelle Boyle is able to walk at baseline for short distances but typically will walk with a walker to his electric chair which is how he typically gets around the house. Mcahelle Boyle lives with his wife who has MS and states he typically helps her with things around the house. Peterson Romero have home health aides that come and help them during the week with showers and other ADLs.  He did not complain of any pain at this time apart from chronic pain in bilateral shoulders.  He denies feelings of presyncope, dizziness, chest pain, shortness of breath, nausea/vomiting/diarrhea, numbness/tingling/swelling.  His vitals are stable.  Labs are stable.  Imaging displays no acute bony abnormalities.  Chest x-ray displayed left basilar airspace disease and CT redemonstrated this and further classified as patchy groundglass infiltrates in the left lower lobe and lingula.  Also seen minimally within the right upper and lower lobe as well.  Incidentally cervical CT spine demonstrated numerous small radiolucency and seen scattered throughout osseous structures with a differential of osteopenia, multiple myeloma or metastatic disease.  He is admitted for further care. I did discuss CODE STATUS with the patient and he wishes to be DNR-CCA. \"     Per patient report 05/21/22  Reports normally sleeps in recliner recently (will also use hospital bed at times) due to \"feeling comfy at night\". Sometimes will lean forward onto knees and reports wife stated he fell asleep leaning forward and fell forward out of lift chair. Hit right side of body and head. Has had difficulty with mobility since being admitted but would like to try to [facility] if I can walk\" today.  Reports chronic B knee issues and wound issues in legs and tries to be as active as possible throughout day. Completed HEP from home PT regularly. Home Health S4 Level Recommendation: Level 3 Safety  AM-PAC Mobility Score   AM-PAC Inpatient Mobility Raw Score : 12       Treatment Time:  14:10 - 15:05  Treatment number: 2  Timed Code Treatment Minutes: 55 minutes  Total Treatment Minutes:  55  minutes    Cognition    A&O orientation not directly assessed. Able to follow 2 step commands    Subjective  Patient lying supine in bed with no family present   Pt agreeable to this PT tx. Pain   No  Location:   Rating:    NA/10  Pain Medicine Status: Denies need     Bed Mobility   Supine to Sit:    Mod A   Sit to Supine:   Not Tested  Rolling: Mod A   Scooting:   Min A     Transfer Training     Sit to stand: Mod A  x 3 trials from chair     Min A x 2 trials from raised bed height  Stand to sit:   Mod A - uncontrolled descent into chair despite VC's for hand placement  Bed to Chair:   Mod A  with use of gait belt and rolling walker (RW)    Gait Training gait completed as indicated below  Distance:      30 ft + 20 ft + 20 ft + 10 ft  Deviations (firm surface/linoleum):  decreased vito, narrow JULIA, forward flexed posture, shuffles, step to pattern and decreased step length bilaterally  Assistive Device Used:    gait belt and rolling walker (RW)  Level of Assist:    Min A   Comment: unsteady at times although no overt LOB, max VC's to keep body closer to RW. Noted L knee buckling. Stair Training deferred, pt does not have stairs in the home environment    Therapeutic Exercise all completed bilaterally unless indicated  Ankle Pumps x 10 reps  LAQ x 10 reps  marching x 10 reps    Balance  Sitting:  Good ; SBA  Comments: EOB, significant kyphotic curve    Standing: Fair ; Min A   Comments: with RW    Patient Education      Role of PT, POC, Discharge recommendations, safety awareness, transfer techniques, pacing activity and calling for assist with mobility.      Positioning Needs Pt up in chair, alarm set, positioned in proper neutral alignment and pressure relief provided. Call light provided and all needs within reach    Activity Tolerance   Pt completed therapy session with No adverse symptoms noted w/activity. Fatigued easily and required frequent rest breaks. Other    Assessment :  Patient eager to ambulate and demonstrated good motivation. Pt very unsafe and unsteady with gait, fatiguing easily. Noted L knee buckling and pt reporting feeling week. Had lengthy discussion about SNF recommendation and PT's concerns for pt's safety at home. Pt continues tor refuse and wants to D/C home today. RN aware. Recommending SNF upon discharge as patient functioning well below baseline, demonstrates good rehab potential and unable to return home due to limited or no family support, burden of care beyond caregiver ability, home environment not conducive to patient recovery and limited safety awareness. Goals (all goals ongoing unless otherwise indicated)  To be met in 3 visits:  1). Independent with LE Ex x 10 reps     To be met in 6 visits:  1). Supine to/from sit: Min A   2). Sit to/from stand: CGA  3). Bed to chair: SBA  4). Gait: Ambulate 20 ft. with SBA and use of gait belt and rolling walker (RW)  5). Tolerate B LE exercises 3 sets of 10-15 reps    Plan   Continue with plan of care. Signature: Merly Ram, PT, DPT, OMT-C  #[id number]      If patient discharges from this facility prior to next visit, this note will serve as the Discharge Summary.

## 2022-05-23 NOTE — FLOWSHEET NOTE
05/23/22 0900   Vital Signs   Temp 97.2 °F (36.2 °C)   Temp Source Oral   Pulse 66   Heart Rate Source Monitor   Resp 16   /65   BP Location Left upper arm   BP Method Automatic   MAP (Calculated) 88   Patient Position High fowlers   Level of Consciousness Alert (0)   MEWS Score 1   Pain Assessment   Pain Assessment 0-10   Pain Level 2   Oxygen Therapy   SpO2 96 %   O2 Device None (Room air)   Shift assessment complete - See flow sheet. Medications given - See [facility]     Patient with no complaints of pain at this time. Patient denies any further needs. Bed alarm is set for patient safety A/O   Call light in reach  Bedside Mobility Assessment Tool (BMAT):     Assessment Level 1- Sit and Shake    1. From a semi-reclined position, ask patient to sit up and rotate to a seated position at the side of the bed. Can use the bedrail. 2. Ask patient to reach out and grab your hand and shake making sure patient reaches across his/her midline. Pass- Patient is able to come to a seated position, maintain core strength. Maintains seated balance while reaching across midline. Move on to Assessment Level 2. Assessment Level 2- Stretch and Point   1. With patient in seated position at the side of the bed, have patient place both feet on the floor (or stool) with knees no higher than hips. 2. Ask patient to stretch one leg and straighten the knee, then bend the ankle/flex and point the toes. If appropriate, repeat with the other leg. Pass- Patient is able to demonstrate appropriate quad strength on intended weight bearing limb(s). Move onto Assessment Level 3. Assessment Level 3- Stand   1. Ask patient to elevate off the bed or chair (seated to standing) using an assistive device (cane, bedrail). 2. Patient should be able to raise buttocks off be and hold for a count of five. May repeat once. Fail- Patient unable to demonstrate standing stability. Patient is MOBILITY LEVEL 3.      Assessment Level 4- Walk   1. Ask patient to march in place at bedside. 2. Then ask patient to advance step and return each foot. Some medical conditions may render a patient from stepping backwards, use your best clinical judgement. Fail- Patient not able to complete tasks OR requires use of assistive device. Patient is MOBILITY LEVEL 3.        Mobility Level- 3

## 2022-05-23 NOTE — PLAN OF CARE
Problem: Skin/Tissue Integrity  Goal: Absence of new skin breakdown  Description: 1. Monitor for areas of redness and/or skin breakdown  2. Assess vascular access sites hourly  3. Every 4-6 hours minimum:  Change oxygen saturation probe site  4. Every 4-6 hours:  If on nasal continuous positive airway pressure, respiratory therapy assess nares and determine need for appliance change or resting period.   Outcome: Progressing     Problem: Discharge Planning  Goal: Discharge to home or other facility with appropriate resources  5/23/2022 0159 by Dc Strauss RN  Outcome: Progressing  5/22/2022 1254 by Adrian Dickerson RN  Outcome: Progressing     Problem: Pain  Goal: Verbalizes/displays adequate comfort level or baseline comfort level  Outcome: Progressing     Problem: Safety - Adult  Goal: Free from fall injury  5/23/2022 0159 by Dc Strauss RN  Outcome: Progressing  5/22/2022 1254 by Adrian Dickerson RN  Outcome: Progressing  Flowsheets  Taken 5/22/2022 1253 by Adrian Dickerson RN  Free From Fall Injury: Instruct family/caregiver on patient safety  Taken 5/21/2022 2328 by Rigoberto Castro RN  Free From Fall Injury: Instruct family/caregiver on patient safety     Problem: ABCDS Injury Assessment  Goal: Absence of physical injury  5/23/2022 0159 by Dc Strauss RN  Outcome: Progressing  5/22/2022 1254 by Adrian Dickerson RN  Outcome: Progressing  Flowsheets  Taken 5/22/2022 1253 by Adrian Dickerson RN  Absence of Physical Injury: Implement safety measures based on patient assessment  Taken 5/21/2022 2328 by Rigoberto Castro RN  Absence of Physical Injury: Implement safety measures based on patient assessment

## 2022-05-23 NOTE — PROGRESS NOTES
Speech Language Pathology  Facility/Department: [facility]  Dysphagia Daily Treatment Note    NAME: Chandan Bae  : 1931  MRN: [id number]    Patient Diagnosis(es):   Patient Active Problem List    Diagnosis Date Noted    Fall 2022    Closed head injury     Elbow injury, right, initial encounter     Skin tear of right elbow without complication     Anticoagulated     Chronic anemia     Pulmonary infiltrates     Unable to ambulate 06/15/2021    Elbow effusion, left     Supratherapeutic INR     Atrial fibrillation (HCC)     S/P TAVR (transcatheter aortic valve replacement)     Thrombocytopenia (HCC)     Essential hypertension     S/P splenectomy     Hairy cell leukemia, in remission ([facility] [address]      Allergies: No Known Allergies  Onset Date: 22    Subjective: Pt denied cough or difficulty swallowing over the weekend or today. Reports his last episode of needing to induce vomiting to clear sensation of esophageal retention was >1 month ago. He is \"very careful\" when eating, eating slowly and taking small bites. MD discharge is ordered for today, pt intends to go home rather than SNF as he perceives he will be more active if he goes home, pending PT reassessment. Pain: Right knee and Right shoulder, RN present and aware    Current Diet: ADULT DIET; Regular; Low Fat/Low Chol/High Fiber/BENY    Diet Tolerance:  Patient tolerating current diet level without signs/symptoms of penetration / aspiration. Dysphagia Treatment and Impressions:   Pt seen in room at bedside with RN permission. No reported concerns re swallow of meals, pt takes pills one at a time, asks to have larger ones cut in half   Tx focused on education re general aspiration and reflux precautions in context of pt's known hiatal hernia with occasional regurgitation of food/mucus.       Respiratory Status: Pt with SPO2% of 96% on RA, no s/s respiratory distress   PO Trials: Pt declined PO trials as he is unsafe to attempt PO while reclined, preferring to stay reclined until PT visit later this afternoon, to minimize arthritis pain before he attempts walking     Education: SLP edu pt re: Recommended compensatory strategies and Aspiration precautions, reflux precautions. Pt verbalized understanding   Assessment: Pt tolerating current diet with no clinical s/s of aspiration. Good carryover of recommended compensatory strategies   Recommendations: SLP recommends to continue with IDDSI 7 Regular Solids; IDDSI 0 Thin Liquids; Meds whole with thin liquids   Risk Management: upright for all intake, stay upright for at least 60 mins after intake, small bites/sips, slow rate of intake, general GERD precautions and general aspiration precautions. If the patient exhibits s/s of aspiration and/or worsening respiratory status, hold PO and contact SLP. Dysphagia Goals:    Short Term Goals:  Timeframe for Short Term Goals: (5 days 05/25/2022)  Goal 1: The patient will tolerate recommended diet with no clinical s/s of aspiration 5/5 5/23: progressing  Goal 2: The patient/caregiver will demonstrate understanding of compensatory swallow strategies, for improved swallow safety 5/23: Met with patient repeating recommendations to indicate understanding     Long Term Goals:   Timeframe for Long Term Goals: (7 days 05/27/2022)  Goal 1: The patient will tolerate least restrictive diet with no clinical s/s of aspiration or worsening respiratory/pulmonary status 5/23: met. Pt declined need for instrumental swallow assessment at this time, stated his understanding of availability of esophagram should symptoms of dysphagia increase or limit his PO intake    Recommendations:  Solid Consistency: IDDSI 7 Regular Solids, choose softer, slippery texture foods.  Avoid large bites, avoid dry or particulate solids- add moisture or sauces to promote esophageal clearance  Liquid Consistency: IDDSI 0 Thin Liquids  Medication: Meds whole with thin liquids    Patient/Family/Caregiver Education: Role of SLP, Recommended compensatory strategies, Aspiration precautions, Anatomical components of swallow structures as they pertain to airway protection and MBS protocols and procedures, rationale for GI follow up if he desires (currently declines). Compensatory Strategies: Upright for all intake, Remain upright at least 30 mins after intake and Small bites, add moisture to make solids slippery to promote esophageal clearance    Plan:    Continued Dysphagia treatment with goals per plan of care. Discharge Recommendations: No ST needs anticipated at next level of care  If pt discharges from hospital prior to Speech/Swallowing discharge, this note serves as tx and discharge summary. Total Treatment Time / Charges     Time in Time out Total Time / units   Cognitive Tx         Speech Tx      Dysphagia Tx 1350 1409 19 min/ 1 unit     Signature: Cirilo Engel MS, CCC-SLP  Speech Language Pathologist       YULIANA Brown

## 2022-05-23 NOTE — DISCHARGE INSTR - COC
Continuity of Care Form    Patient Name: Brody Powers   :  1931  MRN:  [id number]    Admit date:  2022  Discharge date:  22    Code Status Order: DNR-CCA   Advance Directives:      Admitting Physician:  Luna Lugo MD  PCP: Nando Nicholas MD    Discharging Nurse: [facility] Unit/Room#: 0211/0211-02  Discharging Unit Phone Number: [phone]    Emergency Contact:   Extended Emergency Contact Information  Primary Emergency Contact: Lynn Wilder  Address: [place] [address] [place] [address]  Mobile Phone: [phone]  Relation: Spouse    Past Surgical History:  Past Surgical History:   Procedure Laterality Date    INTRACAPSULAR CATARACT EXTRACTION Left 3/27/2019    PHACO EMULSIFICATION OF CATARACT WITH INTRAOCULAR LENS IMPLANT EYE performed by Taco Gamboa MD at [facility] Right 4/3/2019    PHACO EMULSIFICATION OF CATARACT WITH INTRAOCULAR LENS IMPLANT EYE performed by Taco Gamboa MD at [address]      2017    SHOULDER SURGERY      SPLENECTOMY         Immunization History:   Immunization History   Administered Date(s) Administered    Tdap (Boostrix, Adacel) 2022       Active Problems:  Patient Active Problem List   Diagnosis Code    Unable to ambulate R26.2    Elbow effusion, left M25.422    Supratherapeutic INR R79.1    Atrial fibrillation (HCC) I48.91    S/P TAVR (transcatheter aortic valve replacement) Z95.2    Thrombocytopenia (HCC) D69.6    Essential hypertension I10    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    Hairy cell leukemia, in remission [facility]) C91.41    Fall W19. XXXA    Closed head injury S09.90XA    Elbow injury, right, initial encounter S59.901A    Skin tear of right elbow without complication N69.269L    Anticoagulated Z79.01    Chronic anemia D64.9    Pulmonary infiltrates R91.8       Isolation/Infection:   Isolation            No Isolation          Patient Infection Status       None to display            Nurse Assessment:  Last Vital Signs: /65   Pulse 66   Temp 97.2 °F (36.2 °C) (Oral)   Resp 16   Ht 5' 9\" (1.753 m)   Wt 169 lb 11.2 oz (77 kg)   SpO2 96%   BMI 25.06 kg/m²     Last documented pain score (0-10 scale): Pain Level: 2  Last Weight:   Wt Readings from Last 1 Encounters:   05/23/22 169 lb 11.2 oz (77 kg)     Mental Status:  oriented and alert    IV Access:  - None    Nursing Mobility/ADLs:  Walking   Assisted  Transfer  Assisted  Bathing  Assisted  Dressing  Assisted  Toileting  Assisted  Feeding  Assisted  Med Admin  Assisted  Med Delivery   whole    Wound Care Documentation and Therapy:  Wound Buttocks Right (Active)   Wound Etiology Pressure Stage 2 05/22/22 2245   Dressing Status Other (Comment) 05/22/22 2245   Dressing/Treatment Zinc paste 05/22/22 2245   Number of days:        Wound Arm Right (Active)   Wound Etiology Skin Tear 05/22/22 2245   Dressing Status Clean;Dry; Intact 05/22/22 2245   Wound Cleansed Not Cleansed 05/22/22 2245   Dressing/Treatment Non adherent; Roll gauze 05/22/22 2245   Number of days:        Incision 03/27/19 Eye Left (Active)   Number of days: 1153       Incision 04/03/19 Eye Right (Active)   Number of days: 1146        Elimination:  Continence: Bowel: Yes  Bladder: Yes  Urinary Catheter: None   Colostomy/Ileostomy/Ileal Conduit: No       Date of Last BM:     Intake/Output Summary (Last 24 hours) at 5/23/2022 1630  Last data filed at 5/23/2022 0554  Gross per 24 hour   Intake --   Output 1125 ml   Net -1125 ml     I/O last 3 completed shifts:  In: -   Out: 1700 [Urine:1700]    Safety Concerns:     None    Impairments/Disabilities:      None    Nutrition Therapy:  Current Nutrition Therapy:   - Oral Diet:  Low Fat and cholestrol     Routes of Feeding: Oral  Liquids:  Thin Liquids  Daily Fluid Restriction: no  Last Modified Barium Swallow with Video (Video Swallowing Test): not done    Treatments at the Time of Hospital Discharge:   Respiratory Treatments:   Oxygen Therapy:  is not on home oxygen therapy. Ventilator:    - No ventilator support    Rehab Therapies: Physical Therapy and Occupational Therapy  Weight Bearing Status/Restrictions: No weight bearing restrictions  Other Medical Equipment (for information only, NOT a DME order):  walker  Other Treatments:     Patient's personal belongings (please select all that are sent with patient):  None    RN SIGNATURE:  Electronically signed by Electronically signed by Isaak Barahona RN on 5/27/2022 at 12:42 PM      CASE MANAGEMENT/SOCIAL WORK SECTION    Inpatient Status Date: 5/20/22    Readmission Risk Assessment Score:  Readmission Risk              Risk of Unplanned Readmission:  0           Discharging to Facility/ Agency   Name: [facility]  Phone: [phone]      / signature: Electronically signed by Bryant Rios RN on 5/25/22 at 4:17 PM EDT    PHYSICIAN SECTION    Prognosis: Good    Condition at Discharge: Stable    Rehab Potential (if transferring to Rehab): Fair    Recommended Labs or Other Treatments After Discharge:  start coumadin today, INR in 3 days    F.w PCP for abnormal Ct scan showing thinning of bones  Local wound care to elbow and knee      Physician Certification: I certify the above information and transfer of Balbir Ceballos  is necessary for the continuing treatment of the diagnosis listed and that he requires [facility] for less 30 days.      Update Admission H&P: No change in H&P    PHYSICIAN SIGNATURE: Sobia Sy MD, 5/25/2022 8:50 AM

## 2022-05-23 NOTE — CONSULTS
[facility] Nurse  Consult Note       NAME:  Raghav Cole RECORD NUMBER:  [id number]  AGE: 80 y.o. GENDER: male  : 1931  TODAY'S DATE:  2022    Subjective   Reason for WOCN Evaluation and Assessment: Right knee, right arm secondary to fall      Dorsey Second is a 80 y.o. male referred by:   [x] Physician  [x] Nursing  [] Other:     Wound Identification:  Wound Type: traumatic and skin tear  Contributing Factors: shear force    Pt seen for wound care. Pt has wounds to right arm and right knee due to fall. Pt on Coumadin. Skin tear guidelines being appropriately followed by staff RN for right arm. Dressing just placed. Will not remove, photo reviewed and discussed with staff rn. Right knee wound with foam dressing  And will replace.       Patient Goal of Care:  [x] Wound Healing  [] Odor Control  [] Palliative Care  [] Pain Control   [] Other:         PAST MEDICAL HISTORY        Diagnosis Date    Aortic valve stenosis     Arthritis     shoulder    Hairy cell leukemia, in remission ([facility] [address]     Hypertension     Phlebitis     Pneumonia     Vertigo        PAST SURGICAL HISTORY    Past Surgical History:   Procedure Laterality Date    INTRACAPSULAR CATARACT EXTRACTION Left 3/27/2019    PHACO EMULSIFICATION OF CATARACT WITH INTRAOCULAR LENS IMPLANT EYE performed by Vladislav Orlando MD at [address] [facility] Right 4/3/2019    PHACO EMULSIFICATION OF CATARACT WITH INTRAOCULAR LENS IMPLANT EYE performed by Vladislav Orlando MD at [address]      2017    SHOULDER SURGERY      SPLENECTOMY         FAMILY HISTORY    Family History   Problem Relation Age of Onset    Rheumatologic Disease Mother     Arthritis Mother     Cancer Paternal Grandmother     Anesth Problems Neg Hx     Broken Bones Neg Hx     Clotting Disorder Neg Hx     Collagen Disease Neg Hx     Diabetes Neg Hx     Dislocations Neg Hx     Osteoporosis Neg Hx     Scoliosis Neg Hx     Severe Sprains Neg Hx        SOCIAL HISTORY    Social History     Tobacco Use    Smoking status: Never Smoker    Smokeless tobacco: Never Used   Vaping Use    Vaping Use: Never used   Substance Use Topics    Alcohol use: Never    Drug use: No       ALLERGIES    No Known Allergies    MEDICATIONS    No current facility-administered medications on file prior to encounter.      Current Outpatient Medications on File Prior to Encounter   Medication Sig Dispense Refill    psyllium (METAMUCIL) 28 % packet Take 1 packet by mouth daily      finasteride (PROSCAR) 5 MG tablet Take 5 mg by mouth daily      tamsulosin (FLOMAX) 0.4 MG capsule Take 0.4 mg by mouth daily (Patient not taking: Reported on 5/20/2022)      atorvastatin (LIPITOR) 10 MG tablet Take 40 mg by mouth daily       warfarin (COUMADIN) 5 MG tablet Take 5 mg by mouth daily 5 mg ev other day; 2.5 mg other days      Multiple Vitamins-Minerals (THERAPEUTIC MULTIVITAMIN-MINERALS) tablet Take 1 tablet by mouth daily      lisinopril (PRINIVIL;ZESTRIL) 40 MG tablet          Objective    /65   Pulse 66   Temp 97.2 °F (36.2 °C) (Oral)   Resp 16   Ht 5' 9\" (1.753 m)   Wt 169 lb 11.2 oz (77 kg)   SpO2 96%   BMI 25.06 kg/m²     LABS:  WBC:    Lab Results   Component Value Date    WBC 7.0 05/22/2022     H/H:    Lab Results   Component Value Date    HGB 9.5 05/22/2022    HCT 28.4 05/22/2022     PTT:  No results found for: APTT, PTT[APTT}  PT/INR:    Lab Results   Component Value Date    PROTIME 35.4 05/20/2022    INR 2.99 05/20/2022     HgBA1c:  No results found for: LABA1C    Assessment   Robby Risk Score: Robby Scale Score: 17    Patient Active Problem List   Diagnosis Code    Unable to ambulate R26.2    Elbow effusion, left M25.422    Supratherapeutic INR R79.1    Atrial fibrillation (HCC) I48.91    S/P TAVR (transcatheter aortic valve replacement) Z95.2    Thrombocytopenia (HCC) D69.6    Essential hypertension I10    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    Hairy cell leukemia, in remission [facility]) C91.41    Fall W19. XXXA    Closed head injury S09.90XA    Elbow injury, right, initial encounter S59.901A    Skin tear of right elbow without complication L09.324W    Anticoagulated Z79.01    Chronic anemia D64.9    Pulmonary infiltrates R91.8       Measurements:  Wound Buttocks Right (Active)   Wound Etiology Pressure Stage 2 05/22/22 2245   Dressing Status Other (Comment) 05/22/22 2245   Dressing/Treatment Zinc paste 05/22/22 2245   Number of days:        Wound Arm Right (Active)   Wound Etiology Skin Tear 05/22/22 2245   Dressing Status Clean;Dry; Intact 05/22/22 2245   Wound Cleansed Not Cleansed 05/22/22 2245   Dressing/Treatment Non adherent; Roll gauze 05/22/22 2245   Number of days:      Incision 03/27/19 Eye Left (Active)   Number of days: 1153       Incision 04/03/19 Eye Right (Active)   Number of days: 0451     right arm: Photo reviewed. Skin tears x2,  75% partial flap present. Flaps are dusky, wound beds are red, moist.  Surrounding skin with scattered ecchymosis, no soi. Right knee:  Traumatic secondary to fall. Full and partial thickness skin loss, no soi        Response to treatment:  Well tolerated by patient. Pain Assessment:  Severity:  0 / 10  Quality of pain: N/A  Wound Pain Timing/Severity: none  Premedicated: N/A    Plan  Right arm: Follow skin tear guidelines  Right knee:  Cleanse with NS, pat dry. Apply Alginate AG over open areas and cover with foam border dressings, change every 3 days and prn. Plan of Care: Wound Buttocks Right-Dressing/Treatment: Zinc paste (refused foam dressing)  Wound Arm Right-Dressing/Treatment: Non adherent,Roll gauze (impregnanted guaze)    Specialty Bed Required : N/A   [] Low Air Loss   [] Pressure Redistribution  [] Fluid Immersion  [] Bariatric  [] Total Pressure Relief  [] Other:     Current Diet: ADULT DIET;  Regular; Low Fat/Low Chol/High Fiber/BENY  Dietician consult:  Yes    Discharge Plan:  Placement for patient upon discharge:TBD  Patient appropriate for Outpatient [address] [facility] [address] [facility] [address]: Yes    Referrals:  [x]   [] [facility]  [] Supplies  [] Other    Patient/Caregiver Teaching:  Level of patient/caregiver understanding able to:   [] Indicates understanding       [] Needs reinforcement  [] Unsuccessful      [x] Verbal Understanding  [] Demonstrated understanding       [] No evidence of learning  [] Refused teaching         [] N/A       Electronically signed by Chrissie Hayward RN, CWOCN on 5/23/2022 at 4:25 PM

## 2022-05-24 LAB
INR BLD: 1.39 (ref 0.88–1.12)
PROTHROMBIN TIME: 15.9 SEC (ref 9.9–12.7)

## 2022-05-24 PROCEDURE — G0378 HOSPITAL OBSERVATION PER HR: HCPCS

## 2022-05-24 PROCEDURE — 6370000000 HC RX 637 (ALT 250 FOR IP): Performed by: NURSE PRACTITIONER

## 2022-05-24 PROCEDURE — 6370000000 HC RX 637 (ALT 250 FOR IP): Performed by: INTERNAL MEDICINE

## 2022-05-24 PROCEDURE — 97530 THERAPEUTIC ACTIVITIES: CPT

## 2022-05-24 PROCEDURE — 2580000003 HC RX 258: Performed by: NURSE PRACTITIONER

## 2022-05-24 PROCEDURE — 99225 PR SBSQ OBSERVATION CARE/DAY 25 MINUTES: CPT | Performed by: INTERNAL MEDICINE

## 2022-05-24 PROCEDURE — 97535 SELF CARE MNGMENT TRAINING: CPT

## 2022-05-24 PROCEDURE — 85610 PROTHROMBIN TIME: CPT

## 2022-05-24 PROCEDURE — 36415 COLL VENOUS BLD VENIPUNCTURE: CPT

## 2022-05-24 RX ORDER — WARFARIN SODIUM 5 MG/1
5 TABLET ORAL
Status: COMPLETED | OUTPATIENT
Start: 2022-05-24 | End: 2022-05-24

## 2022-05-24 RX ADMIN — ATORVASTATIN CALCIUM 40 MG: 40 TABLET, FILM COATED ORAL at 18:48

## 2022-05-24 RX ADMIN — LISINOPRIL 40 MG: 20 TABLET ORAL at 09:32

## 2022-05-24 RX ADMIN — FINASTERIDE 5 MG: 5 TABLET, FILM COATED ORAL at 09:32

## 2022-05-24 RX ADMIN — WARFARIN SODIUM 5 MG: 5 TABLET ORAL at 18:47

## 2022-05-24 RX ADMIN — SODIUM CHLORIDE, PRESERVATIVE FREE 10 ML: 5 INJECTION INTRAVENOUS at 09:33

## 2022-05-24 RX ADMIN — PSYLLIUM HUSK 1 PACKET: 3.4 POWDER ORAL at 15:41

## 2022-05-24 RX ADMIN — SODIUM CHLORIDE, PRESERVATIVE FREE 10 ML: 5 INJECTION INTRAVENOUS at 22:13

## 2022-05-24 NOTE — PROGRESS NOTES
Pharmacy Note  Warfarin Consult  Dx: afib  Goal INR range 2-3   Home Warfarin dose: Alternates 5mg and 2.5mg  Pt fell closed head injury, warfarin has been held. Now order to restart  No bleed from head, and elbow wound has stopped bleeding  Date  INR  Warfarin  5/24                 1.39                  5mg  Recommend Warfarin 5mg tonight x1. Daily INR ordered. Rx will continue to manage therapy per consult order.   Yusuf Cao Pharm D 4/79/651911:61 AM  .

## 2022-05-24 NOTE — PROGRESS NOTES
Occupational Therapy Daily Treatment Note    Unit: 2 [place]  Date:  5/24/2022  Patient Name:    Nimco Orantes  Admitting diagnosis: Thrombocytopenia ([facility] [address] [D69.6]  Anticoagulated [Z79.01]  Chronic anemia [D64.9]  Pulmonary infiltrates [R91.8]  Closed head injury, initial encounter [V21.75KI]  Fall, initial encounter [W19. XXXA]  Injury of right shoulder, initial encounter [S49.91XA]  Falls, initial encounter [L67. XXXA]  Elbow injury, right, initial encounter [S59.901A]  Skin tear of right elbow without complication, initial encounter [S51.011A]  Admit Date:  5/20/2022  Precautions/Restrictions:      Fall risk, Bed/chair alarm, Lines -IV, Telemetry and R elbow and knee wound, chronic circulatory issues in BLE       Discharge Recommendations: SNF  DME needs for discharge: defer to facility       Therapy recommendations for staff:   Assist of 1 with use of rolling walker (RW) for all transfers to/from [facility]  to/from [facility] gait belt     AM-PAC Score: AM-PAC Inpatient Daily Activity Raw Score: 12  Home Health S4 Level: NA       Treatment Time: 1334-1123   Treatment number:  2    Timed code treatment minutes: 55 minutes  Total treatment minutes:   55  minutes    History of Present Illness:    Per H&P CHRISTINA Duarte 5/20/22:  \"The patient is a 80 y. o. male with HTN, Afib, CAD, PAD, SA node dysfunction, Complete AV block s/p pacemaker, aortic valve stenosis s/p TAVR, anemia of chronic disease, thrombocytopenia, Myelodysplastic syndrome, hx of skin neoplasm, history of hairy cell leukemia, in remission and hx of splenectomy who presented to [facility] with complaint of fall.  Patient states that this morning around 4 AM he he woke up and was going to get out of his recliner to urinate.  States that he lost about a second and half of the time and before he knew it he was on the ground. He does have an electric recliner that aids him standing up and states he may have been pushing the button to elevate him.  He states he cannot piece together how he fell and does not believe that he had attempted to stand up but he did hit his head when he fell. Pankaj Damico is on Coumadin for anticoagulation.  He states that he has a fear of falling and is generally very careful around the home. Pankaj Damico is able to walk at baseline for short distances but typically will walk with a walker to his electric chair which is how he typically gets around the house. Pankaj Damico lives with his wife who has MS and states he typically helps her with things around the house. Edith Weaver have home health aides that come and help them during the week with showers and other ADLs.  He did not complain of any pain at this time apart from chronic pain in bilateral shoulders.  He denies feelings of presyncope, dizziness, chest pain, shortness of breath, nausea/vomiting/diarrhea, numbness/tingling/swelling.  His vitals are stable.  Labs are stable.  Imaging displays no acute bony abnormalities.  Chest x-ray displayed left basilar airspace disease and CT redemonstrated this and further classified as patchy groundglass infiltrates in the left lower lobe and lingula.  Also seen minimally within the right upper and lower lobe as well.  Incidentally cervical CT spine demonstrated numerous small radiolucency and seen scattered throughout osseous structures with a differential of osteopenia, multiple myeloma or metastatic disease.  He is admitted for further care. I did discuss CODE STATUS with the patient and he wishes to be DNR-CCA. \"  Subjective:  Pt in the bed and was asking to use the commode     Pain   Yes  Rating: 10/10  Location:shoulders, back, LEs  Pain Medicine Status: No request made      Bed Mobility:   Supine to Sit:  Mod A   Sit to Supine:  Not Tested  Rolling: Mod A   Scooting:        Min A     Transfer Training:   Sit to stand:    Mod A  from [facility] bed   Stand to sit:  Mod A   Bed to Chair:  Mod A  and with RW and gait belt   Bed to [facility]:   Mod A  and with RW and gait belt   Standard toilet:   Not Tested    Activity Tolerance   Pt completed therapy session with pain     ADL Training:   Upper body dressing: Not Tested  Upper body bathing:  Not Tested  Lower body dressing:  Max assist to don pull ups over feet and mod to pull over hips when standing   Lower body bathing:  Not Tested  Toileting:   Not Tested  Grooming/Hygiene:  SBA for pt to wipe self when sitting on the [facility]     Therapeutic Exercise:   N/A    Patient Education:   Role of OT  Safe RW use/hand placement    Positioning Needs:   Pt reclined in chair, alarm set, positioned in proper neutral alignment and pressure relief provided. Family Present:  No    Assessment: The pt was mod assist for supine to sit and mod assist for sit to stand to the RW from a raised bed. Pt was mod assist to transfer to the [facility] with the RW and gait belt     GOALS    To be met in 3 Visits:  1). Bed to [facility]: minimal assistance (25%)     To be met in 5 Visits:  1). Supine to/from Sit:             minimal assistance (25%)  2). Upper Body Bathing:         minimal assistance (25%)  3). Lower Body Bathing:         moderate assistance (50%)  4). Upper Body Dressing:       minimal assistance (25%)  5). Lower Body Dressing:       moderate assistance (50%)  6).  Pt to demonstrate UE exs x 15 reps with minimal cues    Plan: cont with LAURE Martin OTR/L [id number]      If patient discharges from this facility prior to next visit, this note will serve as the Discharge Summary

## 2022-05-24 NOTE — PROGRESS NOTES
IM Progress Note    Admit Date:  5/20/2022  0    Interval history:  fall    Subjective:  Mr. Rock Davidson seen sitting up in bed today . feels ok today   No further bleeding from elbow wounds    Decided to go to SNF now     Objective:     BP (!) 145/67   Pulse 61   Temp 97.3 °F (36.3 °C) (Oral)   Resp 16   Ht 5' 9\" (1.753 m)   Wt 166 lb 3.2 oz (75.4 kg)   SpO2 100%   BMI 24.54 kg/m²       Intake/Output Summary (Last 24 hours) at 5/24/2022 0726  Last data filed at 5/24/2022 0442  Gross per 24 hour   Intake --   Output 1450 ml   Net -1450 ml       Physical Exam:    General:  Elderly male up in chair  HEENT - right frontal hematoma noted with surrounding ecchymoses  Awake, alert and oriented. Appears to be not in any distress  Mucous Membranes:  Pink , anicteric  Neck: No JVD, no carotid bruit, no thyromegaly  Chest:  Clear to auscultation bilaterally, no added sounds  Cardiovascular:  RRR S1S2 heard, no murmurs or gallops  Abdomen:  Soft, undistended, non tender, no organomegaly, BS present  Extremities: large dressing to elbow  Skin tears   , dry dressing to right knee  Chronic skin changes and purple discoloration to both ankles    No edema or cyanosis.  Distal pulses well felt  Neurological : grossly normal        Medications:   Scheduled Medications:    psyllium  1 packet Oral Daily    atorvastatin  40 mg Oral Daily    sodium chloride flush  5-40 mL IntraVENous 2 times per day    finasteride  5 mg Oral Daily    lisinopril  40 mg Oral Daily    therapeutic multivitamin-minerals  1 tablet Oral Daily     I   sodium chloride 250 mL (05/21/22 0243)     sodium chloride flush, sodium chloride, ondansetron **OR** ondansetron, acetaminophen **OR** acetaminophen, polyethylene glycol    Lab Data:  Recent Labs     05/22/22  0555   WBC 7.0   HGB 9.5*   HCT 28.4*   MCV 94.2   PLT 45*     Recent Labs     05/22/22  0555      K 3.5      CO2 25   BUN 22*   CREATININE 1.1     No results for input(s): CKTOTAL, CKMB, CKMBINDEX, TROPONINI in the last 72 hours. Coagulation:   Lab Results   Component Value Date    INR 2.99 05/20/2022     Cardiac markers:   Lab Results   Component Value Date    TROPONINI 0.02 05/20/2022         Lab Results   Component Value Date    ALT 15 05/20/2022    AST 18 05/20/2022    ALKPHOS 80 05/20/2022    BILITOT 0.9 05/20/2022       Lab Results   Component Value Date    INR 2.99 (H) 05/20/2022    INR 1.31 (H) 06/18/2021    INR 1.65 (H) 06/17/2021    PROTIME 35.4 (H) 05/20/2022    PROTIME 15.2 (H) 06/18/2021    PROTIME 19.2 (H) 06/17/2021       Radiology      CULTURES  Results for Adrianne Ceballos (MRN [id number]) as of 5/20/2022 13:33    Ref. Range 5/20/2022 08:06   SARS-CoV-2, NAAT Latest Ref Range: Not Detected  Not Detected         EKG:  I have reviewed the EKG with the following interpretation:   Demand pacemaker; interpretation is based on intrinsic rhythm  Wide QRS rhythm with occasional and consecutive Premature ventricular complexes  Baseline artifact  Abnormal ECG  No previous ECGs available       RADIOLOGY    CT Head WO Contrast   Final Result   No acute intracranial abnormality.       Chronic generalized age-appropriate cortical atrophy with chronic   microvascular ischemic changes.           CT CHEST WO CONTRAST   Final Result   1. Patchy ground-glass infiltrates are seen within the lingula and left lower   lobe predominantly, though minimally within the right upper and lower lobe   which may represent asymmetric edema or multifocal pneumonia. 2. Mild cardiomegaly with indwelling cardiac leads as well as postoperative   change from prior TAVR. 3. Moderate hiatal hernia. 4. Diffuse atherosclerosis with coronary artery involvement. 5. No acute chest wall fracture or pneumothorax.    6. Moderate stool volume partially visualized in the upper abdomen suggesting   constipation.           XR SHOULDER RIGHT (MIN 2 VIEWS)   Final Result   Advanced degenerative changes seen within the right shoulder as well as   degenerative change in the right elbow, though no acute osseous fracture or   dislocation.       Peripheral arterial disease.           XR ELBOW RIGHT (MIN 3 VIEWS)   Final Result   Advanced degenerative changes seen within the right shoulder as well as   degenerative change in the right elbow, though no acute osseous fracture or   dislocation.       Peripheral arterial disease.           XR KNEE RIGHT (1-2 VIEWS)   Final Result   Bilateral tricompartmental osteoarthritis.       Bilateral meniscal calcification.       Small intra-articular body in the posterior joint space measuring 13 mm on   the right.       Small suprapatellar joint effusion on the left.       No acute fracture seen in the knees.           XR KNEE LEFT (1-2 VIEWS)   Final Result   Bilateral tricompartmental osteoarthritis.       Bilateral meniscal calcification.       Small intra-articular body in the posterior joint space measuring 13 mm on   the right.       Small suprapatellar joint effusion on the left.       No acute fracture seen in the knees.           XR CHEST PORTABLE   Final Result   Cardiomegaly with left basilar airspace disease and effusion.           CT Cervical Spine WO Contrast   Final Result   1. No acute cervical spine fracture. 2. Extensive multilevel degenerative changes seen within the cervical spine. 3. Numerous small radiolucencies are seen scattered through the osseous   structures, which could be related to underlying osteopenia, however other   etiologies such as multiple myeloma or metastatic disease are in the   differential.                 ASSESSMENT/PLAN:    #Fall  #Closed head injury, on Coumadin  -Patient unclear of how he fell, ? LOC, denies presyncope, lightheadedness or dizziness  -On AC and hit head, no acute bleed on head CT  -Hematoma on right side of forehead; monitor for increase in size  -Thankfully no acute bony injury on imaging  -Skin tear to right elbow; wound care consulted  -PTOT recommends SNF placement  - dc to rehab today     Abnormal cxr   -Patient does endorse intermittent episodes of dysphagia leading to choking and vomiting.  Has not occurred for some time.   -Imaging with possible pneumonitis - no symptoms of infection noted  -He is afebrile with not leukocytosis  -Unasyn will be dcd  -SLP eval and treat done and no issues noted  -      #HTN  -BP controlled  -Continue lisinopril  -Monitor     #Troponin elevation  -0.02--0.02--trend  -EKG without apparent ischemia  -No CP  -Do not suspect ACS     #Constipation  -Continue metamucil  -Glycolax PRN     #Incidental findings on CT   -Cervical CT with numerous small radiolucencies are seen scattered through the osseous   structures, which could be related to underlying osteopenia, however other   etiologies such as multiple myeloma or metastatic disease are in the   differential.   -Discussed with patient  -Recommended follow up with Oncologist as OP for recommendations     #Testicular hydrocele  -Right side; non-tender  -Has urology appt in June  -Recommend OP follow up with uro     #Right inguinal hernia; patient reported  -Could not appreciate on exam  -Suspect direct given symptoms  -Discussed options and red flag sxs  -Recommended support briefs and follow up with General surgery as needed     #CAD  #PAD  -On statin, no antiplatelet given blood disorders  -Chronic ulcer on left ankle previously seen by wound care  -Wound care for above      #Afib  #Complete AV block s/p pacemaker  #SA node dysfunction  #Aortic valve stenosis s/p TAVR  -[facility] cardiology  -Monitor on tele  -Pharm to dose coumadin     #Anemia of chronic disease  -Hgb stable at baseline  -No s/s of active hemorrhage   -Fw Heme/Onc  -S/p Procrit injections     #Chronic Thrombocytopenia  #Myelodysplastic syndrome  #Hx of hairy cell leukemia, in remission  -Fw Heme/Onc     #Hx of skin neoplasm  -Sees dermatologist regularly     #Hx of splenectomy    DVT Prophylaxis: Coumadin- resume today     Diet: ADULT DIET;  Regular; Low Fat/Low Chol/High Fiber/BENY  Code Status: DNR-CCA    Dc to rehab  INR in 3 days      Tori Muro MD, 5/24/2022 7:26 AM

## 2022-05-24 NOTE — CARE COORDINATION
INTERDISCIPLINARY PLAN OF CARE CONFERENCE    Date/Time: 5/24/2022 3:44 PM  Completed by: Augustina Elena RN, Case Management      Patient Name:  Bruce Martínez  YOB: 1931  Admitting Diagnosis: Thrombocytopenia ([facility] [address] [D69.6]  Anticoagulated [Z79.01]  Chronic anemia [D64.9]  Pulmonary infiltrates [R91.8]  Closed head injury, initial encounter [U59.33BO]  Fall, initial encounter [W19. XXXA]  Injury of right shoulder, initial encounter [S49.91XA]  Falls, initial encounter [L74. XXXA]  Elbow injury, right, initial encounter [S59.901A]  Skin tear of right elbow without complication, initial encounter [S51.011A]     Admit Date/Time:  5/20/2022  3:42 AM    Chart reviewed. Interdisciplinary team contacted or reviewed plan related to patient progress and discharge plans. Disciplines included Case Management, Nursing, and Dietitian. Current Status: Stable  PT/OT recommendation for discharge plan of care: SNF    Expected D/C Disposition:  Rehab  Confirmed plan with patient and/or family Yes confirmed with: (name) wife  Met with: patient and wife  Discharge Plan Comments:  Reviewed chart and spoke with pt and wife at bedside re: unable to place pt at [facility]. Next choices are [facility] [place] who per Anali Perez is out of network with insurance plan. Spoke with [facility] which is nex choice and after review states is in network with insurance, has beds and can accept. [facility] states pre cert started today. Pt and wife updated. Will cont to follow.     Home O2 in place on admit: No  Pt informed of need to bring portable home O2 tank on day of discharge for nursing to connect prior to leaving:  Not Indicated  Verbalized agreement/Understanding:  Not Indicated

## 2022-05-25 LAB
INR BLD: 1.28 (ref 0.88–1.12)
PROTHROMBIN TIME: 14.6 SEC (ref 9.9–12.7)
SARS-COV-2, NAAT: DETECTED

## 2022-05-25 PROCEDURE — 6370000000 HC RX 637 (ALT 250 FOR IP): Performed by: INTERNAL MEDICINE

## 2022-05-25 PROCEDURE — 36415 COLL VENOUS BLD VENIPUNCTURE: CPT

## 2022-05-25 PROCEDURE — G0378 HOSPITAL OBSERVATION PER HR: HCPCS

## 2022-05-25 PROCEDURE — 85610 PROTHROMBIN TIME: CPT

## 2022-05-25 PROCEDURE — 87635 SARS-COV-2 COVID-19 AMP PRB: CPT

## 2022-05-25 PROCEDURE — 99224 PR SBSQ OBSERVATION CARE/DAY 15 MINUTES: CPT | Performed by: INTERNAL MEDICINE

## 2022-05-25 PROCEDURE — 2580000003 HC RX 258: Performed by: NURSE PRACTITIONER

## 2022-05-25 PROCEDURE — 6370000000 HC RX 637 (ALT 250 FOR IP): Performed by: NURSE PRACTITIONER

## 2022-05-25 RX ORDER — WARFARIN SODIUM 5 MG/1
5 TABLET ORAL
Status: COMPLETED | OUTPATIENT
Start: 2022-05-25 | End: 2022-05-25

## 2022-05-25 RX ADMIN — ATORVASTATIN CALCIUM 40 MG: 40 TABLET, FILM COATED ORAL at 19:04

## 2022-05-25 RX ADMIN — LISINOPRIL 40 MG: 20 TABLET ORAL at 09:18

## 2022-05-25 RX ADMIN — FINASTERIDE 5 MG: 5 TABLET, FILM COATED ORAL at 09:18

## 2022-05-25 RX ADMIN — WARFARIN SODIUM 5 MG: 5 TABLET ORAL at 19:04

## 2022-05-25 RX ADMIN — PSYLLIUM HUSK 1 PACKET: 3.4 POWDER ORAL at 14:18

## 2022-05-25 RX ADMIN — SODIUM CHLORIDE, PRESERVATIVE FREE 10 ML: 5 INJECTION INTRAVENOUS at 09:18

## 2022-05-25 ASSESSMENT — PAIN SCALES - GENERAL: PAINLEVEL_OUTOF10: 0

## 2022-05-25 NOTE — DISCHARGE INSTR - DIET
Good nutrition is important when healing from an illness, injury, or surgery. Follow any nutrition recommendations given to you during your hospital stay. If you were given an oral nutrition supplement while in the hospital, continue to take this supplement at home. You can take it with meals, in-between meals, and/or before bedtime. These supplements can be purchased at most local grocery stores, pharmacies, and chain [employer]-stores. If you have any questions about your diet or nutrition, call the hospital and ask for the dietitian. Low fat; low cholesterol diet.

## 2022-05-25 NOTE — CARE COORDINATION
Passr completed for SNF admission; submitted with no additional information needed.  Copy placed in hard chart     DUYEN Saunders

## 2022-05-25 NOTE — PROGRESS NOTES
Pharmacy Note  Warfarin Consult  Dx: afib  Goal INR range 2-3   Home Warfarin dose: Alternates 5mg and 2.5mg  Pt fell closed head injury, warfarin has been held. Now order to restart  No bleed from head, and elbow wound has stopped bleeding  Date  INR  Warfarin  5/24                 1.39                  5mg  5/25                 1.28                  5mg  Recommend Warfarin 5mg tonight x1. Daily INR ordered. Rx will continue to manage therapy per consult order. Mónica Dickerson Pharm D 5/25/20229:26 AM  .      .

## 2022-05-25 NOTE — CARE COORDINATION
DISCHARGE ORDER  Date/Time 2022 4:20 PM  Completed by: Cricket Encarnacion RN, Case Management    Patient Name: Terri Santiago    : 1931      Admit order Date and Status: Stable  Noted discharge order. (verify MD's last order for status of admission/Traditional Medicare 3 [place] Inpatient qualifying stay required for SNF)    Confirmed discharge plan with:              Patient:  Yes              When pt confirms DC plan does any support person need to be contacted by CM Yes if yes who wife                      Discharge to Facility:  [facility] phone number for staff giving report:    Pre-certification completed: Yes  Hospital Exemption Notification (HENS) completed: Jennie Fox completed as pt is in obs status   Discharge orders and Continuity of Care faxed to facility:   facility will obtain from [employer]      Transportation:               Medical Transport explained with choice list offered to pt/family. Choice:(no preference)  Agency used: [address] [place] [address] Road up time:   18:15      Pt/family/Nursing/Facility aware of  time:  Yes Names:  Ashley Kim charge, Mimi Pierce nurse, pt, pt wife and Jonathan Darling at [facility] form completed:   Yes      Comments: Order for dc noted. Spoke with pt and wife and plan remains for rehab at [facility] at [facility]. Spoke with Lisa at [facility] who has completed prior auht and approves pt for rehab wit [facility] , NRD  Auth ID [id number] noris ID # [id number]. Spoke with Jonathan Darling at [facility] who also has approval and will accept today. Chart reviwed andno other dc needs identified. Pt is being d/c'd to SNF today. Pt's O2 sats are 95% on RA. Discharge timeout done with  Nsg, CM and pt and wife. All discharge needs and concerns addressed.     Discharging nurse to complete VALARIE, reconcile AVS, and place final copy with patient's discharge packet. Discharging RN to ensure that written prescriptions for  Level II medications are sent with patient to the facility as per protocol.

## 2022-05-25 NOTE — PROGRESS NOTES
Notified Dr. Brito Island of stat lab result of rapid Covid test being positive. [facility] not accepting patient at this time. [facility] notified to cancel transportation. Pt placed in droplet plus per primary RN. Clinical  notified.

## 2022-05-25 NOTE — DISCHARGE SUMMARY
Name:  Sharron England  Room:  0211/0211-02  MRN:    [id number]    Discharge Summary      This discharge summary is in conjunction with a complete physical exam done on the day of discharge. Discharging Physician: Dr. Hilad Arce: 5/20/2022  Discharge:   05/25/22     HPI taken from admission H&P:      The patient is a 80 y.o. male with HTN, Afib, CAD, PAD, SA node dysfunction, Complete AV block s/p pacemaker, aortic valve stenosis s/p TAVR, anemia of chronic disease, thrombocytopenia, Myelodysplastic syndrome, hx of skin neoplasm, history of hairy cell leukemia, in remission and hx of splenectomy who presented to [facility] ED with complaint of fall. Patient states that this morning around 4 AM he he woke up and was going to get out of his recliner to urinate. States that he lost about a second and half of the time and before he knew it he was on the ground. He does have an electric recliner that aids him standing up and states he may have been pushing the button to elevate him. He states he cannot piece together how he fell and does not believe that he had attempted to stand up but he did hit his head when he fell. He is on Coumadin for anticoagulation. He states that he has a fear of falling and is generally very careful around the home. He is able to walk at baseline for short distances but typically will walk with a walker to his electric chair which is how he typically gets around the house. He lives with his wife who has MS and states he typically helps her with things around the house. They have home health aides that come and help them during the week with showers and other ADLs. He did not complain of any pain at this time apart from chronic pain in bilateral shoulders. He denies feelings of presyncope, dizziness, chest pain, shortness of breath, nausea/vomiting/diarrhea, numbness/tingling/swelling. His vitals are stable. Labs are stable. Imaging displays no acute bony abnormalities. Chest x-ray displayed left basilar airspace disease and CT redemonstrated this and further classified as patchy groundglass infiltrates in the left lower lobe and lingula. Also seen minimally within the right upper and lower lobe as well. Incidentally cervical CT spine demonstrated numerous small radiolucency and seen scattered throughout osseous structures with a differential of osteopenia, multiple myeloma or metastatic disease. He is admitted for further care.     I did discuss CODE STATUS with the patient and he wishes to be DNR-CCA. Diagnoses this Admission and Hospital Course           #Fall  #Closed head injury, on Coumadin  -Patient unclear of how he fell, ? LOC, denies presyncope, lightheadedness or dizziness  -On AC and hit head, no acute bleed on head CT  -Hematoma on right side of forehead; monitor for increase in size  -Thankfully no acute bony injury on imaging  -Skin tear to right elbow; wound care consulted  -PTOT recommends SNF placement  - dc to rehab today     Abnormal cxr   -Patient does endorse intermittent episodes of dysphagia leading to choking and vomiting.  Has not occurred for some time.   -Imaging with possible pneumonitis - no symptoms of infection noted  -He is afebrile with not leukocytosis  -Unasyn will be dcd  -SLP eval and treat done and no issues noted  -      #HTN  -BP controlled  -Continue lisinopril  -Monitor     #Troponin elevation  -0.02--0.02--trend  -EKG without apparent ischemia  -No CP  -Do not suspect ACS     #Constipation  -Continue metamucil  -Glycolax PRN     #Incidental findings on CT   -Cervical CT with numerous small radiolucencies are seen scattered through the osseous   structures, which could be related to underlying osteopenia, however other   etiologies such as multiple myeloma or metastatic disease are in the   differential.   -Discussed with patient  -Recommended follow up with Oncologist or PCP      #Testicular hydrocele  -Right side; non-tender  -Has urology appt in June  -Recommend OP follow up with uro     #Right inguinal hernia; patient reported  -Could not appreciate on exam  -Suspect direct given symptoms  -Discussed options and red flag sxs  -Recommended support briefs and follow up with General surgery as needed     #CAD  #PAD  -On statin, no antiplatelet given blood disorders  -Chronic ulcer on left ankle previously seen by wound care  -Wound care for above      #Afib  #Complete AV block s/p pacemaker  #SA node dysfunction  #Aortic valve stenosis s/p TAVR  -Fw [facility] cardiology  -Monitor on tele  -Pharm to dose coumadin     #Anemia of chronic disease  -Hgb stable at baseline  -No s/s of active hemorrhage   -Fw Heme/Onc  -S/p Procrit injections     #Chronic Thrombocytopenia  #Myelodysplastic syndrome  #Hx of hairy cell leukemia, in remission  -Fw Heme/Onc     #Hx of skin neoplasm  -Sees dermatologist regularly     #Hx of splenectomy      DVT Prophylaxis: Coumadin- resume today      Diet: ADULT DIET; Regular; Low Fat/Low Chol/High Fiber/BENY  Code Status: DNR-CCA    Procedures (Please Review Full Report for Details)  N/A    Consults    N/A      Physical Exam at Discharge:    BP (!) 156/90   Pulse 66   Temp 98.2 °F (36.8 °C) (Oral)   Resp 16   Ht 5' 9\" (1.753 m)   Wt 165 lb 1.6 oz (74.9 kg)   SpO2 95%   BMI 24.38 kg/m²       General:  Elderly male up in chair  HEENT - right frontal hematoma noted with surrounding ecchymoses  Awake, alert and oriented. Appears to be not in any distress  Mucous Membranes:  Pink , anicteric  Neck: No JVD, no carotid bruit, no thyromegaly  Chest:  Clear to auscultation bilaterally, no added sounds  Cardiovascular:  RRR S1S2 heard, no murmurs or gallops  Abdomen:  Soft, undistended, non tender, no organomegaly, BS present  Extremities: large dressing to elbow  Skin tears   , dry dressing to right knee  Chronic skin changes and purple discoloration to both ankles    No edema or cyanosis.  Distal pulses well felt  Neurological : grossly normal    CBC:   No results for input(s): WBC, HGB, HCT, MCV, PLT in the last 72 hours. BMP:   No results for input(s): NA, K, CL, CO2, PHOS, BUN, CREATININE, CA in the last 72 hours. CULTURES  Results for Jackie Babin (MRN [id number]) as of 5/20/2022 13:33    Ref. Range 5/20/2022 08:06   SARS-CoV-2, NAAT Latest Ref Range: Not Detected  Not Detected          RADIOLOGY  CT Head WO Contrast   Final Result   No acute intracranial abnormality. Chronic generalized age-appropriate cortical atrophy with chronic   microvascular ischemic changes. CT CHEST WO CONTRAST   Final Result   1. Patchy ground-glass infiltrates are seen within the lingula and left lower   lobe predominantly, though minimally within the right upper and lower lobe   which may represent asymmetric edema or multifocal pneumonia. 2. Mild cardiomegaly with indwelling cardiac leads as well as postoperative   change from prior TAVR. 3. Moderate hiatal hernia. 4. Diffuse atherosclerosis with coronary artery involvement. 5. No acute chest wall fracture or pneumothorax. 6. Moderate stool volume partially visualized in the upper abdomen suggesting   constipation. XR SHOULDER RIGHT (MIN 2 VIEWS)   Final Result   Advanced degenerative changes seen within the right shoulder as well as   degenerative change in the right elbow, though no acute osseous fracture or   dislocation. Peripheral arterial disease. XR ELBOW RIGHT (MIN 3 VIEWS)   Final Result   Advanced degenerative changes seen within the right shoulder as well as   degenerative change in the right elbow, though no acute osseous fracture or   dislocation. Peripheral arterial disease. XR KNEE RIGHT (1-2 VIEWS)   Final Result   Bilateral tricompartmental osteoarthritis. Bilateral meniscal calcification. Small intra-articular body in the posterior joint space measuring 13 mm on   the right.       Small suprapatellar joint effusion on the left. No acute fracture seen in the knees. XR KNEE LEFT (1-2 VIEWS)   Final Result   Bilateral tricompartmental osteoarthritis. Bilateral meniscal calcification. Small intra-articular body in the posterior joint space measuring 13 mm on   the right. Small suprapatellar joint effusion on the left. No acute fracture seen in the knees. XR CHEST PORTABLE   Final Result   Cardiomegaly with left basilar airspace disease and effusion. CT Cervical Spine WO Contrast   Final Result   1. No acute cervical spine fracture. 2. Extensive multilevel degenerative changes seen within the cervical spine. 3. Numerous small radiolucencies are seen scattered through the osseous   structures, which could be related to underlying osteopenia, however other   etiologies such as multiple myeloma or metastatic disease are in the   differential.               Discharge Medications     Medication List      CHANGE how you take these medications    Metamucil 28 % packet  Generic drug: psyllium  What changed: Another medication with the same name was removed. Continue taking this medication, and follow the directions you see here. CONTINUE taking these medications    atorvastatin 10 MG tablet  Commonly known as: LIPITOR     finasteride 5 MG tablet  Commonly known as: PROSCAR     lisinopril 40 MG tablet  Commonly known as: PRINIVIL;ZESTRIL     tamsulosin 0.4 mg capsule  Commonly known as: FLOMAX     therapeutic multivitamin-minerals tablet     warfarin 5 MG tablet  Commonly known as: COUMADIN              Discharged in stable condition to home    Follow Up:   Follow up with PCP in 1 week    Jesus Gaytan MD, 5/25/2022 8:50 AM

## 2022-05-26 LAB
INR BLD: 1.31 (ref 0.87–1.14)
PROTHROMBIN TIME: 16.1 SEC (ref 11.7–14.5)
SARS-COV-2, NAAT: DETECTED

## 2022-05-26 PROCEDURE — 6370000000 HC RX 637 (ALT 250 FOR IP): Performed by: NURSE PRACTITIONER

## 2022-05-26 PROCEDURE — G0378 HOSPITAL OBSERVATION PER HR: HCPCS

## 2022-05-26 PROCEDURE — 99224 PR SBSQ OBSERVATION CARE/DAY 15 MINUTES: CPT | Performed by: INTERNAL MEDICINE

## 2022-05-26 PROCEDURE — 97535 SELF CARE MNGMENT TRAINING: CPT

## 2022-05-26 PROCEDURE — 6370000000 HC RX 637 (ALT 250 FOR IP): Performed by: INTERNAL MEDICINE

## 2022-05-26 PROCEDURE — 92526 ORAL FUNCTION THERAPY: CPT

## 2022-05-26 PROCEDURE — 97530 THERAPEUTIC ACTIVITIES: CPT

## 2022-05-26 PROCEDURE — 85610 PROTHROMBIN TIME: CPT

## 2022-05-26 PROCEDURE — 36415 COLL VENOUS BLD VENIPUNCTURE: CPT

## 2022-05-26 PROCEDURE — 87635 SARS-COV-2 COVID-19 AMP PRB: CPT

## 2022-05-26 RX ORDER — WARFARIN SODIUM 5 MG/1
5 TABLET ORAL
Status: COMPLETED | OUTPATIENT
Start: 2022-05-26 | End: 2022-05-26

## 2022-05-26 RX ADMIN — WARFARIN SODIUM 5 MG: 5 TABLET ORAL at 17:33

## 2022-05-26 RX ADMIN — ATORVASTATIN CALCIUM 40 MG: 40 TABLET, FILM COATED ORAL at 17:33

## 2022-05-26 RX ADMIN — PSYLLIUM HUSK 1 PACKET: 3.4 POWDER ORAL at 14:29

## 2022-05-26 RX ADMIN — FINASTERIDE 5 MG: 5 TABLET, FILM COATED ORAL at 10:54

## 2022-05-26 RX ADMIN — LISINOPRIL 40 MG: 20 TABLET ORAL at 10:54

## 2022-05-26 RX ADMIN — ACETAMINOPHEN 650 MG: 325 TABLET ORAL at 10:57

## 2022-05-26 ASSESSMENT — PAIN SCALES - GENERAL
PAINLEVEL_OUTOF10: 7
PAINLEVEL_OUTOF10: 0

## 2022-05-26 ASSESSMENT — PAIN DESCRIPTION - DESCRIPTORS: DESCRIPTORS: BURNING

## 2022-05-26 ASSESSMENT — PAIN DESCRIPTION - PAIN TYPE: TYPE: ACUTE PAIN;CHRONIC PAIN

## 2022-05-26 ASSESSMENT — PAIN DESCRIPTION - FREQUENCY: FREQUENCY: INTERMITTENT

## 2022-05-26 ASSESSMENT — PAIN DESCRIPTION - ONSET: ONSET: ON-GOING

## 2022-05-26 ASSESSMENT — PAIN DESCRIPTION - LOCATION: LOCATION: SHOULDER;HEAD

## 2022-05-26 ASSESSMENT — PAIN DESCRIPTION - ORIENTATION: ORIENTATION: RIGHT;LEFT;POSTERIOR

## 2022-05-26 NOTE — CARE COORDINATION
INTERDISCIPLINARY PLAN OF CARE CONFERENCE    Date/Time: 5/26/2022 2:58 PM  Completed by: Bryant Rios RN, Case Management      Patient Name:  Balbir Ceballos  YOB: 1931  Admitting Diagnosis: Thrombocytopenia ([address] [D69.6]  Anticoagulated [Z79.01]  Chronic anemia [D64.9]  Pulmonary infiltrates [R91.8]  Closed head injury, initial encounter [R76.58ML]  Fall, initial encounter [W19. XXXA]  Injury of right shoulder, initial encounter [S49.91XA]  Falls, initial encounter [X40. XXXA]  Elbow injury, right, initial encounter [S59.901A]  Skin tear of right elbow without complication, initial encounter [S51.011A]     Admit Date/Time:  5/20/2022  3:42 AM    Chart reviewed. Interdisciplinary team contacted or reviewed plan related to patient progress and discharge plans. Disciplines included Case Management, Nursing, and Dietitian. Current Status: 5/20/22 observation  PT/OT recommendation for discharge plan of care:  SNF    Expected D/C Disposition:  Rehab  Confirmed plan with patient and/or family Yes confirmed with: (name) wife  Met with: spoke with pt and wife via phone as pt in covid precaution  Discharge Plan Comments: Order for dc noted. Noted pt was to dc on 5/25 to [facility] and covid test was positive and they then could not accept. Spoke with wife this AM and discussed facilities accepting covid positive pt and the choice is [address]. Spoke with Yoana Graf at [facility] who after review states can accept and will attempt to change auth from [facility] to [facility]. States may have bed today and will F/U with writer. Spoke with Sg at [facility] who can accept and will re-start pre cert as per insurance previous pre cert has to be cancelled and new pre cert started as pt going to different facility.  Will follow       Home O2 in place on admit: No  Pt informed of need to bring portable home O2 tank on day of discharge for nursing to connect prior to leaving:  Not Indicated  Verbalized agreement/Understanding:  Not Indicated

## 2022-05-26 NOTE — PROGRESS NOTES
IM Progress Note    Admit Date:  5/20/2022  0    Interval history:  Fall    Dc to Rehab held for covid positive ( turned positive 5/25)     Subjective:  Mr. Moore Velia seen sitting up in bed today . feels ok today   Cannot believe he has covid as he does not have symptoms  Asking for recheck     Objective:     /64   Pulse 66   Temp 97.3 °F (36.3 °C) (Oral)   Resp 16   Ht 5' 9\" (1.753 m)   Wt 161 lb 8 oz (73.3 kg)   SpO2 94%   BMI 23.85 kg/m²       Intake/Output Summary (Last 24 hours) at 5/26/2022 1550  Last data filed at 5/26/2022 0543  Gross per 24 hour   Intake --   Output 950 ml   Net -950 ml       Physical Exam:    General:  Elderly male up in bed  HEENT - right frontal hematoma noted with surrounding ecchymoses  Awake, alert and oriented. Appears to be not in any distress  Mucous Membranes:  Pink , anicteric  Neck: No JVD, no carotid bruit, no thyromegaly  Chest:  Clear to auscultation bilaterally, no added sounds  Cardiovascular:  RRR S1S2 heard, no murmurs or gallops  Abdomen:  Soft, undistended, non tender, no organomegaly, BS present  Extremities: large dressing to elbow  Skin tears   , dry dressing to right knee  Chronic skin changes and purple discoloration to both ankles    No edema or cyanosis.  Distal pulses well felt  Neurological : grossly normal        Medications:   Scheduled Medications:    warfarin  5 mg Oral Once    warfarin placeholder: dosing by pharmacy   Other RX Placeholder    psyllium  1 packet Oral Daily    atorvastatin  40 mg Oral Daily    sodium chloride flush  5-40 mL IntraVENous 2 times per day    finasteride  5 mg Oral Daily    lisinopril  40 mg Oral Daily    therapeutic multivitamin-minerals  1 tablet Oral Daily     I   sodium chloride Stopped (05/25/22 1639)     sodium chloride flush, sodium chloride, ondansetron **OR** ondansetron, acetaminophen **OR** acetaminophen, polyethylene glycol    Lab Data:  No results for input(s): WBC, HGB, HCT, MCV, PLT in the last 72 hours. No results for input(s): NA, K, CL, CO2, PHOS, BUN, CREATININE, CA in the last 72 hours. No results for input(s): CKTOTAL, CKMB, CKMBINDEX, TROPONINI in the last 72 hours. Coagulation:   Lab Results   Component Value Date    INR 1.31 05/26/2022     Cardiac markers:   Lab Results   Component Value Date    TROPONINI 0.02 05/20/2022         Lab Results   Component Value Date    ALT 15 05/20/2022    AST 18 05/20/2022    ALKPHOS 80 05/20/2022    BILITOT 0.9 05/20/2022       Lab Results   Component Value Date    INR 1.31 (H) 05/26/2022    INR 1.28 (H) 05/25/2022    INR 1.39 (H) 05/24/2022    PROTIME 16.1 (H) 05/26/2022    PROTIME 14.6 (H) 05/25/2022    PROTIME 15.9 (H) 05/24/2022       Radiology      CULTURES  Results for Aurelia Kerns (MRN [id number]) as of 5/20/2022 13:33    Ref. Range 5/20/2022 08:06   SARS-CoV-2, NAAT Latest Ref Range: Not Detected  Not Detected         EKG:  I have reviewed the EKG with the following interpretation:   Demand pacemaker; interpretation is based on intrinsic rhythm  Wide QRS rhythm with occasional and consecutive Premature ventricular complexes  Baseline artifact  Abnormal ECG  No previous ECGs available       RADIOLOGY    CT Head WO Contrast   Final Result   No acute intracranial abnormality.       Chronic generalized age-appropriate cortical atrophy with chronic   microvascular ischemic changes.           CT CHEST WO CONTRAST   Final Result   1. Patchy ground-glass infiltrates are seen within the lingula and left lower   lobe predominantly, though minimally within the right upper and lower lobe   which may represent asymmetric edema or multifocal pneumonia. 2. Mild cardiomegaly with indwelling cardiac leads as well as postoperative   change from prior TAVR. 3. Moderate hiatal hernia. 4. Diffuse atherosclerosis with coronary artery involvement. 5. No acute chest wall fracture or pneumothorax.    6. Moderate stool volume partially visualized in the upper abdomen suggesting   constipation.           XR SHOULDER RIGHT (MIN 2 VIEWS)   Final Result   Advanced degenerative changes seen within the right shoulder as well as   degenerative change in the right elbow, though no acute osseous fracture or   dislocation.       Peripheral arterial disease.           XR ELBOW RIGHT (MIN 3 VIEWS)   Final Result   Advanced degenerative changes seen within the right shoulder as well as   degenerative change in the right elbow, though no acute osseous fracture or   dislocation.       Peripheral arterial disease.           XR KNEE RIGHT (1-2 VIEWS)   Final Result   Bilateral tricompartmental osteoarthritis.       Bilateral meniscal calcification.       Small intra-articular body in the posterior joint space measuring 13 mm on   the right.       Small suprapatellar joint effusion on the left.       No acute fracture seen in the knees.           XR KNEE LEFT (1-2 VIEWS)   Final Result   Bilateral tricompartmental osteoarthritis.       Bilateral meniscal calcification.       Small intra-articular body in the posterior joint space measuring 13 mm on   the right.       Small suprapatellar joint effusion on the left.       No acute fracture seen in the knees.           XR CHEST PORTABLE   Final Result   Cardiomegaly with left basilar airspace disease and effusion.           CT Cervical Spine WO Contrast   Final Result   1. No acute cervical spine fracture. 2. Extensive multilevel degenerative changes seen within the cervical spine. 3. Numerous small radiolucencies are seen scattered through the osseous   structures, which could be related to underlying osteopenia, however other   etiologies such as multiple myeloma or metastatic disease are in the   differential.                 ASSESSMENT/PLAN:    #Fall  #Closed head injury, on Coumadin  -Patient unclear of how he fell, ? LOC, denies presyncope, lightheadedness or dizziness  -On AC and hit head, no acute bleed on head CT  -Hematoma on right side of forehead; monitor for increase in size  -Thankfully no acute bony injury on imaging  -Skin tear to right elbow; wound care consulted  -PTOT recommends SNF placement  - dc to rehab today pending as he is now covid positive with no symptoms  Repeat test ordered     Abnormal cxr   -Patient does endorse intermittent episodes of dysphagia leading to choking and vomiting.  Has not occurred for some time.   -Imaging with possible pneumonitis - no symptoms of infection noted  -He is afebrile with not leukocytosis  -Unasyn will be dcd  -SLP eval and treat done and no issues noted  -      #HTN  -BP controlled  -Continue lisinopril  -Monitor     #Troponin elevation  -0.02--0.02--trend  -EKG without apparent ischemia  -No CP  -Do not suspect ACS     #Constipation  -Continue metamucil  -Glycolax PRN     #Incidental findings on CT   -Cervical CT with numerous small radiolucencies are seen scattered through the osseous   structures, which could be related to underlying osteopenia, however other   etiologies such as multiple myeloma or metastatic disease are in the   differential.   -Discussed with patient  -Recommended follow up with Oncologist as OP for recommendations     #Testicular hydrocele  -Right side; non-tender  -Has urology appt in June  -Recommend OP follow up with uro     #Right inguinal hernia; patient reported  -Could not appreciate on exam  -Suspect direct given symptoms  -Discussed options and red flag sxs  -Recommended support briefs and follow up with General surgery as needed     #CAD  #PAD  -On statin, no antiplatelet given blood disorders  -Chronic ulcer on left ankle previously seen by wound care  -Wound care for above      #Afib  #Complete AV block s/p pacemaker  #SA node dysfunction  #Aortic valve stenosis s/p TAVR  -Fw [facility] cardiology  -Monitor on tele  -Pharm to dose coumadin     #Anemia of chronic disease  -Hgb stable at baseline  -No s/s of active hemorrhage   -Fw Heme/Onc  -S/p Procrit injections     #Chronic Thrombocytopenia  #Myelodysplastic syndrome  #Hx of hairy cell leukemia, in remission  -Fw Heme/Onc     #Hx of skin neoplasm  -Sees dermatologist regularly     #Hx of splenectomy      DVT Prophylaxis: Coumadin    Diet: ADULT DIET;  Regular; Low Fat/Low Chol/High Fiber/BENY  Code Status: DNR-CCA    Dc to rehab  INR in 3 days      Jennifer Deleon MD, 5/26/2022 3:50 PM

## 2022-05-26 NOTE — PROGRESS NOTES
Occupational Therapy Daily Treatment Note    Unit: 2 [place]  Date:  5/26/2022  Patient Name:    Terri Santiago  Admitting diagnosis: Thrombocytopenia ([facility] [address] [D69.6]  Anticoagulated [Z79.01]  Chronic anemia [D64.9]  Pulmonary infiltrates [R91.8]  Closed head injury, initial encounter [X51.45PD]  Fall, initial encounter [W19. XXXA]  Injury of right shoulder, initial encounter [S49.91XA]  Falls, initial encounter [P32. XXXA]  Elbow injury, right, initial encounter [S59.901A]  Skin tear of right elbow without complication, initial encounter [S51.011A]  Admit Date:  5/20/2022  Precautions/Restrictions:    Covid isolation   Fall risk, Bed/chair alarm, Lines -IV, Telemetry and R elbow and knee wound, chronic circulatory issues in BLE       Discharge Recommendations: SNF  DME needs for discharge: defer to facility       Therapy recommendations for staff:   Assist of 1 with use of rolling walker (RW) for all transfers to/from [facility]  to/from [facility] gait belt     AM-PAC Score: AM-PAC Inpatient Daily Activity Raw Score: 12  Home Health S4 Level: NA       Treatment Time: 5598-4566  Treatment number:  3  Timed code treatment minutes: 55 minutes  Total treatment minutes: 55  minutes    History of Present Illness:    Per H&P CHRISTINA Davis 5/20/22:  \"The patient is a 80 y. o. male with HTN, Afib, CAD, PAD, SA node dysfunction, Complete AV block s/p pacemaker, aortic valve stenosis s/p TAVR, anemia of chronic disease, thrombocytopenia, Myelodysplastic syndrome, hx of skin neoplasm, history of hairy cell leukemia, in remission and hx of splenectomy who presented to [facility] with complaint of fall.  Patient states that this morning around 4 AM he he woke up and was going to get out of his recliner to urinate.  States that he lost about a second and half of the time and before he knew it he was on the ground. He does have an electric recliner that aids him standing up and states he may have been pushing the button to elevate him. He states he cannot piece together how he fell and does not believe that he had attempted to stand up but he did hit his head when he fell. [facility] is on Coumadin for anticoagulation.  He states that he has a fear of falling and is generally very careful around the home. [facility] is able to walk at baseline for short distances but typically will walk with a walker to his electric chair which is how he typically gets around the house. [facility] lives with his wife who has MS and states he typically helps her with things around the house. Seth Singh have home health aides that come and help them during the week with showers and other ADLs.  He did not complain of any pain at this time apart from chronic pain in bilateral shoulders.  He denies feelings of presyncope, dizziness, chest pain, shortness of breath, nausea/vomiting/diarrhea, numbness/tingling/swelling.  His vitals are stable.  Labs are stable.  Imaging displays no acute bony abnormalities.  Chest x-ray displayed left basilar airspace disease and CT redemonstrated this and further classified as patchy groundglass infiltrates in the left lower lobe and lingula.  Also seen minimally within the right upper and lower lobe as well.  Incidentally cervical CT spine demonstrated numerous small radiolucency and seen scattered throughout osseous structures with a differential of osteopenia, multiple myeloma or metastatic disease.  He is admitted for further care. I did discuss CODE STATUS with the patient and he wishes to be DNR-CCA. \"  Subjective:  Pt in the bed and was asking to use the commode     Pain   Yes  Rating: moderate  Location:shoulders  Pain Medicine Status: No request made      Bed Mobility: Pt moves slowly for bed mobility and transfers  Supine to Sit:  Min assist with head of bed elevated  Sit to Supine:  Not Tested  Rolling:           NT  Scooting:        Min A     Transfer Training:   Sit to stand:    Mod A  from [facility] , mod assist from the [facility]   Stand to sit:  Mod A with VC for hand placement   Bed to Chair:  Mod A  and with RW and gait belt   Bed to BSC:   Mod A  and with RW and gait belt   Standard toilet:   Not Tested    Activity Tolerance   Pt completed therapy session with pain , fatigue   Sp02 94% on room air   ADL Training:   Upper body dressing: Not Tested  Upper body bathing:  Not Tested  Lower body dressing:    mod to pull over hips when standing   Lower body bathing:  Not Tested  Toileting:   Not Tested  Grooming/Hygiene:  SBA for pt to wipe self when sitting on the [facility]     Therapeutic Exercise:   N/A    Patient Education:   Role of OT  Safe RW use/hand placement    Positioning Needs:   Pt reclined in chair, alarm set, positioned in proper neutral alignment and pressure relief provided. Family Present:  No    Assessment: The pt was min  assist for supine to sit and mod assist for sit to stand to the RW from a raised bed. Pt was mod assist to transfer to the [facility] with the RW and gait belt Pt complained of bilateral shoulder pain. The pt was able to wipe self when sitting on the [facility]. Pt cooperative with treatment and needs further OT to improve functional IND. GOALS    To be met in 3 Visits:  1). Bed to [facility]: minimal assistance (25%)     To be met in 5 Visits:  1). Supine to/from Sit:             minimal assistance (25%)  2). Upper Body Bathing:         minimal assistance (25%)  3). Lower Body Bathing:         moderate assistance (50%)  4). Upper Body Dressing:       minimal assistance (25%)  5). Lower Body Dressing:       moderate assistance (50%)  6).  Pt to demonstrate UE exs x 15 reps with minimal cues    Plan: cont with POC      Mima Skiff OTR/L [id number]      If patient discharges from this facility prior to next visit, this note will serve as the Discharge Summary

## 2022-05-26 NOTE — PROGRESS NOTES
Pharmacy Note  Warfarin Consult  Dx: afib  Goal INR range 2-3   Home Warfarin dose: Alternates 5mg and 2.5mg  Pt fell closed head injury, warfarin has been held. Now order to restart  No bleed from head, and elbow wound has stopped bleeding  Date  INR  Warfarin  5/24                 1.39                  5mg  5/25                 1.28                  5mg  5/26                 1.31                  5mg  Recommend Warfarin 5mg tonight x1. Daily INR ordered. Rx will continue to manage therapy per consult order. Sanchez Lauren Pharm D 5/26/202211:01 AM  .      . Kip Rodriguez

## 2022-05-26 NOTE — PROGRESS NOTES
Speech Language Pathology  Facility/Department: [address] [place] [address] [facility] [place] [facility]  Dysphagia Daily Treatment Note    NAME: Joe Bah  : 1931  MRN: [id number]     Recommendations:  Solid Consistency: IDDSI 7 Regular Solids  Liquid Consistency: IDDSI 0 Thin Liquids  Medication: Meds whole with thin liquids    Patient Diagnosis(es):   Patient Active Problem List    Diagnosis Date Noted    Fall 2022    Closed head injury     Elbow injury, right, initial encounter     Skin tear of right elbow without complication     Anticoagulated     Chronic anemia     Pulmonary infiltrates     Unable to ambulate 06/15/2021    Elbow effusion, left     Supratherapeutic INR     Atrial fibrillation (HCC)     S/P TAVR (transcatheter aortic valve replacement)     Thrombocytopenia (HCC)     Essential hypertension     S/P splenectomy     Hairy cell leukemia, in remission ([facility] [address]      Allergies: No Known Allergies  Onset Date: 22    Subjective: Pt alert and cooperative, agreeable to dysphagia f/u. Pt in bed. Pain: Right knee and Right shoulder - pt denies need for intervention     Current Diet: ADULT DIET; Regular; Low Fat/Low Chol/High Fiber/BENY    Diet Tolerance:  Patient tolerating current diet level without signs/symptoms of penetration / aspiration. Dysphagia Treatment and Impressions:   Pt seen in room at bedside with RN permission. No reported concerns re: swallow.  Respiratory Status: no s/s respiratory distress. Pt recently with positive covid test.   Pt reports tolerating recommended diet with no concerns. Pt able to report safe swallow strategies - fully upright, small bites/sips, slow rate.  PO Trials:    Popsicle (melts down to thin liquid) - Pt with adequate oral acceptance and mastication. Adequate A-P transit and oral clearance. Suspect timely swallow, adequate laryngeal elevation. No overt s/s of aspiration.       Education: SLP edu pt re: Recommended compensatory strategies (small bites/sips, slow rate, alternate liquids and solids, fully upright) and aspiration precautions. Pt verbalized understanding - pt demonstrating good understanding with teach back and implementation with PO intake,    Assessment: Pt tolerating current diet with no clinical s/s of aspiration. Good carryover of recommended compensatory strategies   Recommendations: SLP recommends to continue with IDDSI 7 Regular Solids; IDDSI 0 Thin Liquids; Meds whole with thin liquids   Risk Management: upright for all intake, stay upright for at least 60 mins after intake, small bites/sips, slow rate of intake, general GERD precautions and general aspiration precautions. If the patient exhibits s/s of aspiration and/or worsening respiratory status, hold PO and contact SLP. *Pt with new covid positive results. SLP to continue to follow for 1-2x more f/u to ensure tolerance of recommended diet and no change in respiratory status with PO.     Dysphagia Goals:    Short Term Goals:  Timeframe for Short Term Goals:  extend through 05/27/2022  Goal 1: The patient will tolerate recommended diet with no clinical s/s of aspiration 5/5 05/26 - addressed and ongoing - see above    Goal 2: The patient/caregiver will demonstrate understanding of compensatory swallow strategies, for improved swallow safety 5/23 - Goal Met     Long Term Goals:   Timeframe for Long Term Goals: (7 days 05/27/2022)  Goal 1: The patient will tolerate least restrictive diet with no clinical s/s of aspiration or worsening respiratory/pulmonary status   05/26 - addressed and ongoing - see above    Recommendations:  Solid Consistency: IDDSI 7 Regular Solids  Liquid Consistency: IDDSI 0 Thin Liquids  Medication: Meds whole with thin liquids    Patient/Family/Caregiver Education: Role of SLP, Recommended compensatory strategies, Aspiration precautions, Anatomical components of swallow structures as they pertain to airway protection and MBS protocols and procedures, rationale for GI follow up if he desires (currently declines). Compensatory Strategies: Upright for all intake, Remain upright at least 30 mins after intake and Small bites, add moisture to make solids slippery to promote esophageal clearance. Small sips, alternate liquids and solids. Plan:    Continued Dysphagia treatment with goals per plan of care. Discharge Recommendations: No ST needs anticipated at next level of care; pt to be seen for 1-2x more f/u in order to ensure diet tolerance and stable respiratory status with recent covid positive results. If pt discharges from hospital prior to Speech/Swallowing discharge, this note serves as tx and discharge summary.      Total Treatment Time / Charges     Time in Time out Total Time / units   Cognitive Tx         Speech Tx      Dysphagia Tx 1325 1345 20 min / 1 unit     Signature:  Lori Swanson [place] [id number] [facility] #[id number]  Speech Language Pathologist

## 2022-05-26 NOTE — FLOWSHEET NOTE
05/26/22 1049   Vitals   Temp 97.3 °F (36.3 °C)   Temp Source Oral   Heart Rate 66   Heart Rate Source Monitor   Resp 16   /64   BP Location Left upper arm   BP Upper/Lower Upper   BP Method Automatic   Patient Position Semi fowlers   Level of Consciousness Alert (0)   MEWS Score 1   Pain Assessment   Pain Assessment 0-10   Pain Level 7   Pain Location Shoulder;Head   Pain Orientation Right;Left;Posterior   Pain Descriptors Burning   Pain Type Acute pain;Chronic pain   Pain Frequency Intermittent   Pain Onset On-going   Non-Pharmaceutical Pain Intervention(s) Repositioned; Rest  (meds )   Shift assessment complete. Scheduled med's given. See MAR. Patients head-toe complete, VS are logged pt C/O pain to head and shoulder gave tylenol per PRN orders . The bed is locked and is in the lowest position. Call light and bedside table are within reach.

## 2022-05-26 NOTE — DISCHARGE SUMMARY
Name:  Radha Cabrales  Room:  0211/0211-02  MRN:    [id number]    Discharge Summary      This discharge summary is in conjunction with a complete physical exam done on the day of discharge. Discharging Physician: Dr. Carmen Jean: 5/20/2022  Discharge:   05/26/22     HPI taken from admission H&P:      The patient is a 80 y.o. male with HTN, Afib, CAD, PAD, SA node dysfunction, Complete AV block s/p pacemaker, aortic valve stenosis s/p TAVR, anemia of chronic disease, thrombocytopenia, Myelodysplastic syndrome, hx of skin neoplasm, history of hairy cell leukemia, in remission and hx of splenectomy who presented to [address] [facility] ED with complaint of fall. Patient states that this morning around 4 AM he he woke up and was going to get out of his recliner to urinate. States that he lost about a second and half of the time and before he knew it he was on the ground. He does have an electric recliner that aids him standing up and states he may have been pushing the button to elevate him. He states he cannot piece together how he fell and does not believe that he had attempted to stand up but he did hit his head when he fell. He is on Coumadin for anticoagulation. He states that he has a fear of falling and is generally very careful around the home. He is able to walk at baseline for short distances but typically will walk with a walker to his electric chair which is how he typically gets around the house. He lives with his wife who has MS and states he typically helps her with things around the house. They have home health aides that come and help them during the week with showers and other ADLs. He did not complain of any pain at this time apart from chronic pain in bilateral shoulders. He denies feelings of presyncope, dizziness, chest pain, shortness of breath, nausea/vomiting/diarrhea, numbness/tingling/swelling. His vitals are stable. Labs are stable. Imaging displays no acute bony abnormalities. Chest x-ray displayed left basilar airspace disease and CT redemonstrated this and further classified as patchy groundglass infiltrates in the left lower lobe and lingula. Also seen minimally within the right upper and lower lobe as well. Incidentally cervical CT spine demonstrated numerous small radiolucency and seen scattered throughout osseous structures with a differential of osteopenia, multiple myeloma or metastatic disease. He is admitted for further care.     I did discuss CODE STATUS with the patient and he wishes to be DNR-CCA. Diagnoses this Admission and Hospital Course           #Fall  #Closed head injury, on Coumadin  -Patient unclear of how he fell, ? LOC, denies presyncope, lightheadedness or dizziness  -On AC and hit head, no acute bleed on head CT  -Hematoma on right side of forehead; monitor for increase in size  -Thankfully no acute bony injury on imaging  -Skin tear to right elbow; wound care consulted  -PTOT recommends SNF placement  - dc to rehab today     Abnormal cxr   -Patient does endorse intermittent episodes of dysphagia leading to choking and vomiting.  Has not occurred for some time.   -Imaging with possible pneumonitis - no symptoms of infection noted  -He is afebrile with not leukocytosis  -Unasyn will be dcd  -SLP eval and treat done and no issues noted  -      #HTN  -BP controlled  -Continue lisinopril  -Monitor     #Troponin elevation  -0.02--0.02--trend  -EKG without apparent ischemia  -No CP  -Do not suspect ACS     #Constipation  -Continue metamucil  -Glycolax PRN     #Incidental findings on CT   -Cervical CT with numerous small radiolucencies are seen scattered through the osseous   structures, which could be related to underlying osteopenia, however other   etiologies such as multiple myeloma or metastatic disease are in the   differential.   -Discussed with patient  -Recommended follow up with Oncologist or PCP      #Testicular hydrocele  -Right side; non-tender  -Has urology appt in June  -Recommend OP follow up with uro     #Right inguinal hernia; patient reported  -Could not appreciate on exam  -Suspect direct given symptoms  -Discussed options and red flag sxs  -Recommended support briefs and follow up with General surgery as needed     #CAD  #PAD  -On statin, no antiplatelet given blood disorders  -Chronic ulcer on left ankle previously seen by wound care  -Wound care for above      #Afib  #Complete AV block s/p pacemaker  #SA node dysfunction  #Aortic valve stenosis s/p TAVR  -Fw [facility] cardiology  -Monitor on tele  -Pharm to dose coumadin     #Anemia of chronic disease  -Hgb stable at baseline  -No s/s of active hemorrhage   -Fw Heme/Onc  -S/p Procrit injections     #Chronic Thrombocytopenia  #Myelodysplastic syndrome  #Hx of hairy cell leukemia, in remission  -Fw Heme/Onc     #Hx of skin neoplasm  -Sees dermatologist regularly     #Hx of splenectomy      DVT Prophylaxis: Coumadin- resume today      Diet: ADULT DIET; Regular; Low Fat/Low Chol/High Fiber/BENY  Code Status: DNR-CCA    Procedures (Please Review Full Report for Details)  N/A    Consults    N/A      Physical Exam at Discharge:    /70   Pulse 78   Temp 98.8 °F (37.1 °C) (Oral)   Resp 16   Ht 5' 9\" (1.753 m)   Wt 161 lb 8 oz (73.3 kg)   SpO2 94%   BMI 23.85 kg/m²       General:  Elderly male up in chair  HEENT - right frontal hematoma noted with surrounding ecchymoses  Awake, alert and oriented. Appears to be not in any distress  Mucous Membranes:  Pink , anicteric  Neck: No JVD, no carotid bruit, no thyromegaly  Chest:  Clear to auscultation bilaterally, no added sounds  Cardiovascular:  RRR S1S2 heard, no murmurs or gallops  Abdomen:  Soft, undistended, non tender, no organomegaly, BS present  Extremities: large dressing to elbow  Skin tears   , dry dressing to right knee  Chronic skin changes and purple discoloration to both ankles    No edema or cyanosis.  Distal pulses well felt  Neurological : grossly normal    CBC:   No results for input(s): WBC, HGB, HCT, MCV, PLT in the last 72 hours. BMP:   No results for input(s): NA, K, CL, CO2, PHOS, BUN, CREATININE, CA in the last 72 hours. CULTURES  Results for Todd Hernández (MRN [id number]) as of 5/20/2022 13:33    Ref. Range 5/20/2022 08:06   SARS-CoV-2, NAAT Latest Ref Range: Not Detected  Not Detected          RADIOLOGY  CT Head WO Contrast   Final Result   No acute intracranial abnormality. Chronic generalized age-appropriate cortical atrophy with chronic   microvascular ischemic changes. CT CHEST WO CONTRAST   Final Result   1. Patchy ground-glass infiltrates are seen within the lingula and left lower   lobe predominantly, though minimally within the right upper and lower lobe   which may represent asymmetric edema or multifocal pneumonia. 2. Mild cardiomegaly with indwelling cardiac leads as well as postoperative   change from prior TAVR. 3. Moderate hiatal hernia. 4. Diffuse atherosclerosis with coronary artery involvement. 5. No acute chest wall fracture or pneumothorax. 6. Moderate stool volume partially visualized in the upper abdomen suggesting   constipation. XR SHOULDER RIGHT (MIN 2 VIEWS)   Final Result   Advanced degenerative changes seen within the right shoulder as well as   degenerative change in the right elbow, though no acute osseous fracture or   dislocation. Peripheral arterial disease. XR ELBOW RIGHT (MIN 3 VIEWS)   Final Result   Advanced degenerative changes seen within the right shoulder as well as   degenerative change in the right elbow, though no acute osseous fracture or   dislocation. Peripheral arterial disease. XR KNEE RIGHT (1-2 VIEWS)   Final Result   Bilateral tricompartmental osteoarthritis. Bilateral meniscal calcification. Small intra-articular body in the posterior joint space measuring 13 mm on   the right.       Small suprapatellar joint effusion on the left. No acute fracture seen in the knees. XR KNEE LEFT (1-2 VIEWS)   Final Result   Bilateral tricompartmental osteoarthritis. Bilateral meniscal calcification. Small intra-articular body in the posterior joint space measuring 13 mm on   the right. Small suprapatellar joint effusion on the left. No acute fracture seen in the knees. XR CHEST PORTABLE   Final Result   Cardiomegaly with left basilar airspace disease and effusion. CT Cervical Spine WO Contrast   Final Result   1. No acute cervical spine fracture. 2. Extensive multilevel degenerative changes seen within the cervical spine. 3. Numerous small radiolucencies are seen scattered through the osseous   structures, which could be related to underlying osteopenia, however other   etiologies such as multiple myeloma or metastatic disease are in the   differential.               Discharge Medications     Medication List      CHANGE how you take these medications    Metamucil 28 % packet  Generic drug: psyllium  What changed: Another medication with the same name was removed. Continue taking this medication, and follow the directions you see here. CONTINUE taking these medications    atorvastatin 10 MG tablet  Commonly known as: LIPITOR     finasteride 5 MG tablet  Commonly known as: PROSCAR     lisinopril 40 MG tablet  Commonly known as: PRINIVIL;ZESTRIL     tamsulosin 0.4 mg capsule  Commonly known as: FLOMAX     therapeutic multivitamin-minerals tablet     warfarin 5 MG tablet  Commonly known as: COUMADIN              Discharged in stable condition to home    Follow Up:   Follow up with PCP in 1 week    Jeff Sanchez MD, 5/26/2022 7:21 AM

## 2022-05-27 VITALS
RESPIRATION RATE: 16 BRPM | HEART RATE: 59 BPM | WEIGHT: 161.5 LBS | TEMPERATURE: 97.5 F | SYSTOLIC BLOOD PRESSURE: 143 MMHG | BODY MASS INDEX: 23.92 KG/M2 | DIASTOLIC BLOOD PRESSURE: 66 MMHG | OXYGEN SATURATION: 94 % | HEIGHT: 69 IN

## 2022-05-27 LAB
INR BLD: 1.41 (ref 0.87–1.14)
PROTHROMBIN TIME: 17.1 SEC (ref 11.7–14.5)

## 2022-05-27 PROCEDURE — 99217 PR OBSERVATION CARE DISCHARGE MANAGEMENT: CPT | Performed by: INTERNAL MEDICINE

## 2022-05-27 PROCEDURE — 36415 COLL VENOUS BLD VENIPUNCTURE: CPT

## 2022-05-27 PROCEDURE — G0378 HOSPITAL OBSERVATION PER HR: HCPCS

## 2022-05-27 PROCEDURE — 85610 PROTHROMBIN TIME: CPT

## 2022-05-27 PROCEDURE — 6370000000 HC RX 637 (ALT 250 FOR IP): Performed by: NURSE PRACTITIONER

## 2022-05-27 RX ORDER — WARFARIN SODIUM 5 MG/1
5 TABLET ORAL
Status: DISCONTINUED | OUTPATIENT
Start: 2022-05-27 | End: 2022-05-27 | Stop reason: HOSPADM

## 2022-05-27 RX ADMIN — FINASTERIDE 5 MG: 5 TABLET, FILM COATED ORAL at 09:26

## 2022-05-27 RX ADMIN — MULTIPLE VITAMINS W/ MINERALS TAB 1 TABLET: TAB at 09:26

## 2022-05-27 RX ADMIN — LISINOPRIL 40 MG: 20 TABLET ORAL at 09:26

## 2022-05-27 NOTE — PLAN OF CARE
Problem: Skin/Tissue Integrity  Goal: Absence of new skin breakdown  Description: 1. Monitor for areas of redness and/or skin breakdown  2. Assess vascular access sites hourly  3. Every 4-6 hours minimum:  Change oxygen saturation probe site  4. Every 4-6 hours:  If on nasal continuous positive airway pressure, respiratory therapy assess nares and determine need for appliance change or resting period.   5/27/2022 0350 by Cory Rodrigues RN  Outcome: Progressing  5/26/2022 1545 by Kennie Severance, RN  Outcome: Progressing     Problem: Discharge Planning  Goal: Discharge to home or other facility with appropriate resources  5/27/2022 0350 by Cory Rodrigues RN  Outcome: Progressing  5/26/2022 1545 by Kennie Severance, RN  Outcome: Progressing     Problem: Pain  Goal: Verbalizes/displays adequate comfort level or baseline comfort level  5/27/2022 0350 by Cory Rodrigues RN  Outcome: Progressing  5/26/2022 1545 by Kennie Severance, RN  Outcome: Progressing     Problem: Safety - Adult  Goal: Free from fall injury  5/27/2022 0350 by Cory Rodrigues RN  Outcome: Progressing  Flowsheets (Taken 5/26/2022 1548 by Kennie Severance, RN)  Free From Fall Injury: Instruct family/caregiver on patient safety  5/26/2022 1545 by Kennie Severance, RN  Outcome: Progressing     Problem: ABCDS Injury Assessment  Goal: Absence of physical injury  5/27/2022 0350 by Cory Rodrigues RN  Outcome: Progressing  Flowsheets (Taken 5/26/2022 1548 by Kennie Severance, RN)  Absence of Physical Injury: Implement safety measures based on patient assessment  5/26/2022 1545 by Kennie Severance, RN  Outcome: Progressing

## 2022-05-27 NOTE — PROGRESS NOTES
Pharmacy Note  Warfarin Consult  Dx: afib  Goal INR range 2-3   Home Warfarin dose: Alternates 5mg and 2.5mg  Pt fell closed head injury, warfarin has been held. Now order to restart  No bleed from head, and elbow wound has stopped bleeding  Date  INR  Warfarin  5/24                 1.39                  5mg  5/25                 1.28                  5mg  5/26                 1.31                  5mg  5/27                 1.41                 5mg  Recommend Warfarin 5mg tonight x1. Daily INR ordered. Rx will continue to manage therapy per consult order. Florencia Broderick Pharm D 4/50/574367:79 AM  .      . .      .

## 2022-05-27 NOTE — PLAN OF CARE
Problem: Skin/Tissue Integrity  Goal: Absence of new skin breakdown  Description: 1. Monitor for areas of redness and/or skin breakdown  2. Assess vascular access sites hourly  3. Every 4-6 hours minimum:  Change oxygen saturation probe site  4. Every 4-6 hours:  If on nasal continuous positive airway pressure, respiratory therapy assess nares and determine need for appliance change or resting period.   5/27/2022 1238 by Geronimo Melo RN  Outcome: Completed  5/27/2022 0350 by Steven Harden RN  Outcome: Progressing     Problem: Discharge Planning  Goal: Discharge to home or other facility with appropriate resources  5/27/2022 1238 by Geronimo Melo RN  Outcome: Completed  Flowsheets (Taken 5/27/2022 0891)  Discharge to home or other facility with appropriate resources:   Identify barriers to discharge with patient and caregiver   Arrange for needed discharge resources and transportation as appropriate   Identify discharge learning needs (meds, wound care, etc)  5/27/2022 0350 by Steven Harden RN  Outcome: Progressing     Problem: Pain  Goal: Verbalizes/displays adequate comfort level or baseline comfort level  5/27/2022 1238 by Geronimo Melo RN  Outcome: Completed  5/27/2022 0350 by Steven Harden RN  Outcome: Progressing     Problem: Safety - Adult  Goal: Free from fall injury  5/27/2022 1238 by Geronimo Melo RN  Outcome: Completed  Flowsheets (Taken 5/27/2022 0351 by Steven Harden, RN)  Free From Fall Injury: Instruct family/caregiver on patient safety  5/27/2022 0350 by Steven Harden RN  Outcome: Progressing  [facility] (Taken 5/26/2022 1548 by Alfa Vang, RN)  Free From Fall Injury: Instruct family/caregiver on patient safety     Problem: ABCDS Injury Assessment  Goal: Absence of physical injury  5/27/2022 1238 by Geronimo Melo RN  Outcome: Completed  Flowsheets (Taken 5/27/2022 0351 by Steven Harden, RN)  Absence of Physical Injury: Implement safety measures based on patient assessment  5/27/2022 0350 by Alfornia Saint, RN  Outcome: Progressing  Flowsheets (Taken 5/26/2022 1548 by Kevan Lopez RN)  Absence of Physical Injury: Implement safety measures based on patient assessment

## 2022-05-27 NOTE — CARE COORDINATION
DISCHARGE ORDER  Date/Time 2022 9:55 AM  Completed by: Roney Daly RN, Case Management    Patient Name: Rowdy Lester    : 1931      Admit order Date and Status: 22 observation  Noted discharge order. (verify MD's last order for status of admission/Traditional Medicare 3 [place] Inpatient qualifying stay required for SNF)    Confirmed discharge plan with:              Patient:  Yes              When pt confirms DC plan does any support person need to be contacted by CM Yes if yes who wife                      Discharge to Facility:  [facility] number for staff giving report: [phone] completed: Yes  Hospital Exemption Notification (HENS) completed:  Brett Junior as pt obs status   Discharge orders and Continuity of Care faxed to facility:  facility will pull from [facility]      Transportation:               Medical Transport explained with choice list offered to pt/family. Choice:(no preference)  Agency used: [address] [place] [address] Road up time:   12:45-13:00      Pt/family/Nursing/Facility aware of  time: Yes Names: Adis Rayo nurse, pt, pt wife and Shaka Hooker at [facility] form completed:  Yes:      Comments: Order for dc noted. Spoke with pt and wife via phone and plan remains for rehab at [address]. Spoke with Shaka Hooker at [facility] who states can accept today for STR. Chart reviewed and no other dc needs identified. Pt is being d/c'd to SNF today. Pt's O2 sats are 94% on RA. Discharge timeout done with  Nsg, CM and pt All discharge needs and concerns addressed. Discharging nurse to complete VALARIE, reconcile AVS, and place final copy with patient's discharge packet. Discharging RN to ensure that written prescriptions for  Level II medications are sent with patient to the facility as per protocol.

## 2022-05-27 NOTE — PROGRESS NOTES
BP (!) 143/66   Pulse 59   Temp 97.5 °F (36.4 °C) (Oral)   Resp 16   Ht 5' 9\" (1.753 m)   Wt 161 lb 8 oz (73.3 kg)   SpO2 94%   BMI 23.85 kg/m²     Assessment complete. Meds passed. Pt denies needs at this time. .   Dressing changes complete at this time. Pt educated on covid and covid related restrictions. Pt informed of discharging time of [id number]        Bedside Mobility Assessment Tool (BMAT):     Assessment Level 1- Sit and Shake    1. From a semi-reclined position, ask patient to sit up and rotate to a seated position at the side of the bed. Can use the bedrail. 2. Ask patient to reach out and grab your hand and shake making sure patient reaches across his/her midline. Pass- Patient is able to come to a seated position, maintain core strength. Maintains seated balance while reaching across midline. Move on to Assessment Level 2. Assessment Level 2- Stretch and Point   1. With patient in seated position at the side of the bed, have patient place both feet on the floor (or stool) with knees no higher than hips. 2. Ask patient to stretch one leg and straighten the knee, then bend the ankle/flex and point the toes. If appropriate, repeat with the other leg. Pass- Patient is able to demonstrate appropriate quad strength on intended weight bearing limb(s). Move onto Assessment Level 3. Assessment Level 3- Stand   1. Ask patient to elevate off the bed or chair (seated to standing) using an assistive device (cane, bedrail). 2. Patient should be able to raise buttocks off be and hold for a count of five. May repeat once. Pass- Patient maintains standing stability for at least 5 seconds, proceed to assessment level 4. Assessment Level 4- Walk   1. Ask patient to march in place at bedside. 2. Then ask patient to advance step and return each foot. Some medical conditions may render a patient from stepping backwards, use your best clinical judgement.    Fail- Patient not able to complete tasks OR requires use of assistive device. Patient is MOBILITY LEVEL 3.        Mobility Level- 3
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
## 2022-07-26 NOTE — DISCHARGE SUMMARY
Hospital Medicine Discharge Summary    Patient ID: Philip Cook      Patient's PCP: Josh Martinez MD    Admit Date: 5/20/2022     Discharge Date: 5/27/2022      Admitting Provider: Jeff Sanchez MD     Discharge Provider: Bernice Smith MD     Discharge Diagnoses: There are no active hospital problems to display for this patient. The patient was seen and examined on day of discharge and this discharge summary is in conjunction with any daily progress note from day of discharge. Hospital Course: The patient is a 80 y.o. male with HTN, Afib, CAD, PAD, SA node dysfunction, Complete AV block s/p pacemaker, aortic valve stenosis s/p TAVR, anemia of chronic disease, thrombocytopenia, Myelodysplastic syndrome, hx of skin neoplasm, history of hairy cell leukemia, in remission and hx of splenectomy who presented to [facility] ED with complaint of fall. Patient states that this morning around 4 AM he he woke up and was going to get out of his recliner to urinate. States that he lost about a second and half of the time and before he knew it he was on the ground. He does have an electric recliner that aids him standing up and states he may have been pushing the button to elevate him. He states he cannot piece together how he fell and does not believe that he had attempted to stand up but he did hit his head when he fell. He is on Coumadin for anticoagulation. He states that he has a fear of falling and is generally very careful around the home. He is able to walk at baseline for short distances but typically will walk with a walker to his electric chair which is how he typically gets around the house. He lives with his wife who has MS and states he typically helps her with things around the house. They have home health aides that come and help them during the week with showers and other ADLs. He did not complain of any pain at this time apart from chronic pain in bilateral shoulders. follow up with Oncologist or PCP     #Testicular hydrocele  -Right side; non-tender  -Has urology appt in June  -Recommend OP follow up with uro     #Right inguinal hernia; patient reported  -Could not appreciate on exam  -Suspect direct given symptoms  -Discussed options and red flag sxs  -Recommended support briefs and follow up with General surgery as needed     #CAD  #PAD  -On statin, no antiplatelet given blood disorders  -Chronic ulcer on left ankle previously seen by wound care  -Wound care for above     #Afib  #Complete AV block s/p pacemaker  #SA node dysfunction  #Aortic valve stenosis s/p TAVR  -[facility] cardiology  -Monitor on tele  -Pharm to dose coumadin     #Anemia of chronic disease  -Hgb stable at baseline  -No s/s of active hemorrhage  -Fw Heme/Onc  -S/p Procrit injections     #Chronic Thrombocytopenia  #Myelodysplastic syndrome  #Hx of hairy cell leukemia, in remission  - Heme/Onc     #Hx of skin neoplasm  -Sees dermatologist regularly     #Hx of splenectomy          Physical Exam Performed:     BP (!) 143/66   Pulse 59   Temp 97.5 °F (36.4 °C) (Oral)   Resp 16   Ht 5' 9\" (1.753 m)   Wt 161 lb 8 oz (73.3 kg)   SpO2 94%   BMI 23.85 kg/m²        General:  Elderly male up in chair  HEENT - right frontal hematoma noted with surrounding ecchymoses  Awake, alert and oriented. Appears to be not in any distress  Mucous Membranes:  Pink , anicteric  Neck: No JVD, no carotid bruit, no thyromegaly  Chest:  Clear to auscultation bilaterally, no added sounds  Cardiovascular:  RRR S1S2 heard, no murmurs or gallops  Abdomen:  Soft, undistended, non tender, no organomegaly, BS present  Extremities: large dressing to elbow  Skin tears   , dry dressing to right knee  Chronic skin changes and purple discoloration to both ankles   No edema or cyanosis. Distal pulses well felt  Neurological : grossly normal       Labs:  For convenience and continuity at follow-up the following most recent labs are provided:      CBC:    Lab Results   Component Value Date/Time    WBC 7.0 05/22/2022 05:55 AM    HGB 9.5 05/22/2022 05:55 AM    HCT 28.4 05/22/2022 05:55 AM    PLT 45 05/22/2022 05:55 AM       Renal:    Lab Results   Component Value Date/Time     05/22/2022 05:55 AM    K 3.5 05/22/2022 05:55 AM     05/22/2022 05:55 AM    CO2 25 05/22/2022 05:55 AM    BUN 22 05/22/2022 05:55 AM    CREATININE 1.1 05/22/2022 05:55 AM    CALCIUM 8.1 05/22/2022 05:55 AM         Significant Diagnostic Studies    Radiology:   CT Head WO Contrast   Final Result   No acute intracranial abnormality. Chronic generalized age-appropriate cortical atrophy with chronic   microvascular ischemic changes. CT CHEST WO CONTRAST   Final Result   1. Patchy ground-glass infiltrates are seen within the lingula and left lower   lobe predominantly, though minimally within the right upper and lower lobe   which may represent asymmetric edema or multifocal pneumonia. 2. Mild cardiomegaly with indwelling cardiac leads as well as postoperative   change from prior TAVR. 3. Moderate hiatal hernia. 4. Diffuse atherosclerosis with coronary artery involvement. 5. No acute chest wall fracture or pneumothorax. 6. Moderate stool volume partially visualized in the upper abdomen suggesting   constipation. XR SHOULDER RIGHT (MIN 2 VIEWS)   Final Result   Advanced degenerative changes seen within the right shoulder as well as   degenerative change in the right elbow, though no acute osseous fracture or   dislocation. Peripheral arterial disease. XR ELBOW RIGHT (MIN 3 VIEWS)   Final Result   Advanced degenerative changes seen within the right shoulder as well as   degenerative change in the right elbow, though no acute osseous fracture or   dislocation. Peripheral arterial disease. XR KNEE RIGHT (1-2 VIEWS)   Final Result   Bilateral tricompartmental osteoarthritis. Bilateral meniscal calcification. Small intra-articular body in the posterior joint space measuring 13 mm on   the right. Small suprapatellar joint effusion on the left. No acute fracture seen in the knees. XR KNEE LEFT (1-2 VIEWS)   Final Result   Bilateral tricompartmental osteoarthritis. Bilateral meniscal calcification. Small intra-articular body in the posterior joint space measuring 13 mm on   the right. Small suprapatellar joint effusion on the left. No acute fracture seen in the knees. XR CHEST PORTABLE   Final Result   Cardiomegaly with left basilar airspace disease and effusion. CT Cervical Spine WO Contrast   Final Result   1. No acute cervical spine fracture. 2. Extensive multilevel degenerative changes seen within the cervical spine.    3. Numerous small radiolucencies are seen scattered through the osseous   structures, which could be related to underlying osteopenia, however other   etiologies such as multiple myeloma or metastatic disease are in the   differential.                Consults:     IP CONSULT TO HOSPITALIST  IP CONSULT TO CASE MANAGEMENT  IP CONSULT TO PHARMACY    Disposition: SNF    Condition at Discharge: Stable    Discharge Instructions/Follow-up:  pcp in 1 week    Code Status:  Prior     Activity: activity as tolerated    Diet: regular diet      Discharge Medications:     Discharge Medication List as of 5/27/2022  2:10 PM             Details   psyllium (METAMUCIL) 28 % packet Take 1 packet by mouth dailyHistorical Med      finasteride (PROSCAR) 5 MG tablet Take 5 mg by mouth dailyHistorical Med      tamsulosin (FLOMAX) 0.4 MG capsule Take 0.4 mg by mouth dailyHistorical Med      atorvastatin (LIPITOR) 10 MG tablet Take 40 mg by mouth daily Historical Med      warfarin (COUMADIN) 5 MG tablet Take 5 mg by mouth daily 5 mg ev other day; 2.5 mg other daysHistorical Med      Multiple Vitamins-Minerals (THERAPEUTIC MULTIVITAMIN-MINERALS) tablet Take 1 tablet by mouth dailyHistorical Med      lisinopril (PRINIVIL;ZESTRIL) 40 MG tablet Historical Med             Time Spent on discharge is more than 30 minutes in the examination, evaluation, counseling and review of medications and discharge plan. Signed:    Siri Randall MD   7/25/2022      Thank you Castro Liu MD for the opportunity to be involved in this patient's care. If you have any questions or concerns, please feel free to contact me at [phone].
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
## 2022-09-01 NOTE — PROGRESS NOTES
Bedside Mobility Assessment Tool (BMAT):     Assessment Level 1- Sit and Shake    1. From a semi-reclined position, ask patient to sit up and rotate to a seated position at the side of the bed. Can use the bedrail. 2. Ask patient to reach out and grab your hand and shake making sure patient reaches across his/her midline. Pass- Patient is able to come to a seated position, maintain core strength. Maintains seated balance while reaching across midline. Move on to Assessment Level 2. Assessment Level 2- Stretch and Point   1. With patient in seated position at the side of the bed, have patient place both feet on the floor (or stool) with knees no higher than hips. 2. Ask patient to stretch one leg and straighten the knee, then bend the ankle/flex and point the toes. If appropriate, repeat with the other leg. Pass- Patient is able to demonstrate appropriate quad strength on intended weight bearing limb(s). Move onto Assessment Level 3. Assessment Level 3- Stand   1. Ask patient to elevate off the bed or chair (seated to standing) using an assistive device (cane, bedrail). 2. Patient should be able to raise buttocks off be and hold for a count of five. May repeat once. Pass   Assessment Level 4- Walk   1. Ask patient to march in place at bedside. 2. Then ask patient to advance step and return each foot. Some medical conditions may render a patient from stepping backwards, use your best clinical judgement. Fail- Patient not able to complete tasks OR requires use of assistive device. Patient is MOBILITY LEVEL 3. Mobility Level- 3     Patient is not able to demonstrate the ability to move from a reclining position to an upright position within the recliner due to weak . See phone encounter 8/30/2022.

## 2022-10-27 NOTE — DISCHARGE INSTRUCTIONS
[facility] Physician Orders and Discharge [address] [facility]  [address], [address]  [place], [facility]  Telephone: [phone]      Fax: [phone]      Your home care company:  [facility] . Your wound-care supplies will be provided by: [address] . NAME:  Sadia Ramírez   YOB: 1931  PRIMARY DIAGNOSIS FOR WOUND CARE CENTER:  Venous leg ulcer . Wound cleansing:   Do not scrub or use excessive force. Wash hands with soap and water before and after dressing changes. Prior to applying a clean dressing, cleanse wound with normal saline, wound cleanser, or mild soap and water. Ask your physician or nurse before getting the wound(s) wet in the shower. Wound care for home:    Right lower leg:  Vashe spray to wound  Triad/PSO to wound  Cover with 4x4's  Aquaphor to hyperkeratotic skin  Lotrisone to fungal rash  Compri 2 lite compression wrap  Single layer light compression medigrip to help hold dressing in place   Leave in place for the week. Keep clean, dry & intact. Your physician has ordered Compression for treatment of venous ulcers, venous insufficiency,and/or Lymphedema. * Do not remove until your next scheduled visit in [address] [place] [address],[address]- do not get wet.     * If your wrap falls down, becomes painful or you have decreased sensation, and/or excessive drainage- please call the [address] [place] [address],[address] for RN visit to get your compression wrap changed   * You need to exercice as tolerated- walking is good   * Elevate your legs preferrably above level of your heart when sitting as much as possible   * The Goal of this therapy is to reduce Edema and get into long term compression garments to control venous insufficiency, lymphedema and reduce occurrence of venous ulcers         Please note, all wounds (unless stated otherwise here) were mechanically debrided at the time of cleansing here in the wound-care center today, so a small amount of pain, drainage or bleeding from that process might be expected, and is normal.     All products for home use, including multiple products for a single wound if applicable, are medically necessary in order to achieve the best chance at timely wound healing. See provider documentation for details if needed. Substituted dressings applied in the [facility] today, if applicable:    N/a    New orders for this week (labs, imaging, medications, etc.):    You may purchase a cast cover at the pharmacy down the [place] to use when bathing to help keep your dressing dry. Continue to wear your compression sleeve on left leg for edema control. You may continue to work with Physical & Occupational therapy. Additional instructions for specific diagnoses:    General comments for venous / lymphedema ulcers: *  Elevate your legs to the level of your heart for at least 30 minutes, several times daily. *  Walk as much as you can tolerate. Avoid standing for long periods of time, but if you must stand, regularly do heel-raises and calf-pump exercises. *  If you have compression wraps designed to remain in place all week, be sure to keep them from getting wet. *  If you have compression garments that can be changed regularly, apply them first thing in the morning, and remove them when you go to bed. Moisturize your skin at bedtime, with Vaseline, Aquaphor, Aveeno, CeraVe, Cetaphil, Eucerin, Lubriderm, etc; but keep the skin between your toes dry. *  If you smoke, your wound can not heal properly -- please talk with us when you're ready to quit. *  Be sure to adhere to any recommendations from your PCP about diuretics (water pills), diet, exercise, and maintaining a healthy weight. If you have questions, please ask. *  If you have a compression pump, aim for at least two hours of use daily.         F/U Appointment is with Dr. Maicol Tovar in 1 week, on                                   at                       .     Your nurse  is NELSON Edge. If we applied slip-resistant hospital socks today, be sure to remove them at least once a day to inspect your toes or feet, even if you're not changing the wraps or dressings underneath. If you see anything concerning (redness, excess moisture, etc), please call and let us know right away. Should you experience any significant changes in your wound(s) (including redness, increased warmth, increased pain, increased drainage, odor, or fever) or have questions about your wound care, please contact the [address] [place] [address] at [phone] Monday-Thursday from 8:00 am - 4:30 pm, or Friday from 8:00 am - 2:30 pm.  If you need help with your wound outside these hours and cannot wait until we are again available, contact your home-care company (if applicable), your PCP, or go to the nearest emergency room.

## 2022-11-02 ENCOUNTER — HOSPITAL ENCOUNTER (OUTPATIENT)
Dept: WOUND CARE | Age: 87
Discharge: HOME | End: 2022-11-02
Payer: MEDICARE

## 2022-11-02 VITALS
RESPIRATION RATE: 118 BRPM | SYSTOLIC BLOOD PRESSURE: 134 MMHG | HEIGHT: 69 IN | DIASTOLIC BLOOD PRESSURE: 93 MMHG | WEIGHT: 162 LBS | TEMPERATURE: 97.8 F | BODY MASS INDEX: 23.99 KG/M2 | HEART RATE: 86 BPM

## 2022-11-02 DIAGNOSIS — L97.212 VENOUS STASIS ULCER OF RIGHT CALF WITH FAT LAYER EXPOSED WITHOUT VARICOSE VEINS (HCC): Primary | ICD-10-CM

## 2022-11-02 DIAGNOSIS — I87.2 VENOUS STASIS ULCER OF RIGHT CALF WITH FAT LAYER EXPOSED WITHOUT VARICOSE VEINS (HCC): Primary | ICD-10-CM

## 2022-11-02 DIAGNOSIS — R60.0 LOWER EXTREMITY EDEMA: ICD-10-CM

## 2022-11-02 PROCEDURE — 11042 DBRDMT SUBQ TIS 1ST 20SQCM/<: CPT

## 2022-11-02 PROCEDURE — 29581 APPL MULTLAYER CMPRN SYS LEG: CPT

## 2022-11-02 PROCEDURE — 99213 OFFICE O/P EST LOW 20 MIN: CPT

## 2022-11-02 RX ORDER — LIDOCAINE 40 MG/G
CREAM TOPICAL ONCE
Status: CANCELLED | OUTPATIENT
Start: 2022-11-02 | End: 2022-11-02

## 2022-11-02 RX ORDER — BACITRACIN ZINC AND POLYMYXIN B SULFATE 500; 1000 [USP'U]/G; [USP'U]/G
OINTMENT TOPICAL ONCE
Status: CANCELLED | OUTPATIENT
Start: 2022-11-02 | End: 2022-11-02

## 2022-11-02 RX ORDER — LIDOCAINE HYDROCHLORIDE 40 MG/ML
SOLUTION TOPICAL ONCE
Status: CANCELLED | OUTPATIENT
Start: 2022-11-02 | End: 2022-11-02

## 2022-11-02 RX ORDER — LIDOCAINE 40 MG/G
CREAM TOPICAL ONCE
Status: DISCONTINUED | OUTPATIENT
Start: 2022-11-02 | End: 2022-11-03 | Stop reason: HOSPADM

## 2022-11-02 RX ORDER — LIDOCAINE 50 MG/G
OINTMENT TOPICAL ONCE
Status: CANCELLED | OUTPATIENT
Start: 2022-11-02 | End: 2022-11-02

## 2022-11-02 RX ORDER — LIDOCAINE 50 MG/G
OINTMENT TOPICAL ONCE
Status: DISCONTINUED | OUTPATIENT
Start: 2022-11-02 | End: 2022-11-03 | Stop reason: HOSPADM

## 2022-11-02 RX ORDER — BACITRACIN ZINC AND POLYMYXIN B SULFATE 500; 1000 [USP'U]/G; [USP'U]/G
OINTMENT TOPICAL ONCE
Status: DISCONTINUED | OUTPATIENT
Start: 2022-11-02 | End: 2022-11-03 | Stop reason: HOSPADM

## 2022-11-02 RX ORDER — LIDOCAINE HYDROCHLORIDE 40 MG/ML
SOLUTION TOPICAL ONCE
Status: DISCONTINUED | OUTPATIENT
Start: 2022-11-02 | End: 2022-11-03 | Stop reason: HOSPADM

## 2022-11-02 NOTE — PLAN OF CARE
215 [facility] Physician Orders and Discharge 800 [facility]  [address], [address]  [place], [facility]  Telephone: [phone]      Fax: [phone]      Your home care company:  [facility] . Your wound-care supplies will be provided by: [facility] . NAME:  Luh Bagley   YOB: 1931  PRIMARY DIAGNOSIS FOR WOUND CARE CENTER:  Venous leg ulcer . Wound cleansing:   Do not scrub or use excessive force. Wash hands with soap and water before and after dressing changes. Prior to applying a clean dressing, cleanse wound with normal saline, wound cleanser, or mild soap and water. Ask your physician or nurse before getting the wound(s) wet in the shower. Wound care for home:    Right lower leg:  Vashe spray to wound  Triad/PSO to wound  Cover with 4x4's  Aquaphor to hyperkeratotic skin  Lotrisone to fungal rash  Compri 2 lite compression wrap  Single layer light compression medigrip to help hold dressing in place   Leave in place for the week. Keep clean, dry & intact. Your physician has ordered Compression for treatment of venous ulcers, venous insufficiency,and/or Lymphedema. * Do not remove until your next scheduled visit in [address] [place] [address],[address]- do not get wet.     * If your wrap falls down, becomes painful or you have decreased sensation, and/or excessive drainage- please call the [address] [place] [address],[address] for RN visit to get your compression wrap changed   * You need to exercice as tolerated- walking is good   * Elevate your legs preferrably above level of your heart when sitting as much as possible   * The Goal of this therapy is to reduce Edema and get into long term compression garments to control venous insufficiency, lymphedema and reduce occurrence of venous ulcers         Please note, all wounds (unless stated otherwise here) were mechanically debrided at the time of cleansing here in the wound-care center today, so a small amount of pain, drainage or bleeding from that process might be expected, and is normal.     All products for home use, including multiple products for a single wound if applicable, are medically necessary in order to achieve the best chance at timely wound healing. See provider documentation for details if needed. Substituted dressings applied in the [address] [place] [address] Avenue,[address] today, if applicable:    N/a    New orders for this week (labs, imaging, medications, etc.):    You may purchase a cast cover at the pharmacy down the [place] to use when bathing to help keep your dressing dry. Continue to wear your compression sleeve on left leg for edema control. You may continue to work with Physical & Occupational therapy. Additional instructions for specific diagnoses:    General comments for venous / lymphedema ulcers: *  Elevate your legs to the level of your heart for at least 30 minutes, several times daily. *  Walk as much as you can tolerate. Avoid standing for long periods of time, but if you must stand, regularly do heel-raises and calf-pump exercises. *  If you have compression wraps designed to remain in place all week, be sure to keep them from getting wet. *  If you have compression garments that can be changed regularly, apply them first thing in the morning, and remove them when you go to bed. Moisturize your skin at bedtime, with Vaseline, Aquaphor, Aveeno, CeraVe, Cetaphil, Eucerin, Lubriderm, etc; but keep the skin between your toes dry. *  If you smoke, your wound can not heal properly -- please talk with us when you're ready to quit. *  Be sure to adhere to any recommendations from your PCP about diuretics (water pills), diet, exercise, and maintaining a healthy weight. If you have questions, please ask. *  If you have a compression pump, aim for at least two hours of use daily.         F/U Appointment is with Dr. Alvin Toussaint in 1 week, on                                   at                       .     Your nurse  is NELSON Edge. If we applied slip-resistant hospital socks today, be sure to remove them at least once a day to inspect your toes or feet, even if you're not changing the wraps or dressings underneath. If you see anything concerning (redness, excess moisture, etc), please call and let us know right away. Should you experience any significant changes in your wound(s) (including redness, increased warmth, increased pain, increased drainage, odor, or fever) or have questions about your wound care, please contact the [facility] at [phone] Monday-Thursday from 8:00 am - 4:30 pm, or Friday from 8:00 am - 2:30 pm.  If you need help with your wound outside these hours and cannot wait until we are again available, contact your home-care company (if applicable), your PCP, or go to the nearest emergency room.

## 2022-11-02 NOTE — PLAN OF CARE
Pt. New to [facility],[address] today for wound on right lower leg. Pt. States he hit his leg on something & present for approx. 1 month. Wound debridement per MD & pt. Tolerated well. No infection noted. Dressing orders per MD & compression with compri 2 lite for edema control. Reinforced importance of exercise, elevation & compression to help with circulation, edema control & wound healing. Pt. To cont. With compression sleeve on left leg for edema control. F/u in [address] in 1 week as ordered, pt. Aware to call sooner with any changes or questions/concerns. Discharge instructions reviewed with patient, all questions answered, copy given to patient. Dressings were applied to all wounds per M.D. Instructions at this visit.

## 2022-11-02 NOTE — PLAN OF CARE
215 [facility] Physician Orders and Discharge 800 [facility]  [address], [address]aan Velaz[address]  [place], [facility]  Telephone: [phone]      Fax: [phone]      Your home care company:  [facility] . Your wound-care supplies will be provided by: [address] [facility] . NAME:  Khalif Call   YOB: 1931  PRIMARY DIAGNOSIS FOR WOUND CARE CENTER:  Venous leg ulcer . Wound cleansing:   Do not scrub or use excessive force. Wash hands with soap and water before and after dressing changes. Prior to applying a clean dressing, cleanse wound with normal saline, wound cleanser, or mild soap and water. Ask your physician or nurse before getting the wound(s) wet in the shower. Wound care for home:    Right lower leg:  Vashe spray to wound  Triad/PSO to wound  Cover with 4x4's  Aquaphor to hyperkeratotic skin  Lotrisone to fungal rash  Compri 2 lite compression wrap  Single layer light compression medigrip to help hold dressing in place   Leave in place for the week. Keep clean, dry & intact. Your physician has ordered Compression for treatment of venous ulcers, venous insufficiency,and/or Lymphedema. * Do not remove until your next scheduled visit in [facility]- do not get wet.     * If your wrap falls down, becomes painful or you have decreased sensation, and/or excessive drainage- please call the [facility] for RN visit to get your compression wrap changed   * You need to exercice as tolerated- walking is good   * Elevate your legs preferrably above level of your heart when sitting as much as possible   * The Goal of this therapy is to reduce Edema and get into long term compression garments to control venous insufficiency, lymphedema and reduce occurrence of venous ulcers         Please note, all wounds (unless stated otherwise here) were mechanically debrided at the time of cleansing here in the wound-care center today, so a small amount of pain, drainage or bleeding from that process might be expected, and is normal.     All products for home use, including multiple products for a single wound if applicable, are medically necessary in order to achieve the best chance at timely wound healing. See provider documentation for details if needed. Substituted dressings applied in the [facility] today, if applicable:    N/a    New orders for this week (labs, imaging, medications, etc.):    You may purchase a cast cover at the pharmacy down the [place] to use when bathing to help keep your dressing dry. Continue to wear your compression sleeve on left leg for edema control. You may continue to work with Physical & Occupational therapy. Additional instructions for specific diagnoses:    General comments for venous / lymphedema ulcers: *  Elevate your legs to the level of your heart for at least 30 minutes, several times daily. *  Walk as much as you can tolerate. Avoid standing for long periods of time, but if you must stand, regularly do heel-raises and calf-pump exercises. *  If you have compression wraps designed to remain in place all week, be sure to keep them from getting wet. *  If you have compression garments that can be changed regularly, apply them first thing in the morning, and remove them when you go to bed. Moisturize your skin at bedtime, with Vaseline, Aquaphor, Aveeno, CeraVe, Cetaphil, Eucerin, Lubriderm, etc; but keep the skin between your toes dry. *  If you smoke, your wound can not heal properly -- please talk with us when you're ready to quit. *  Be sure to adhere to any recommendations from your PCP about diuretics (water pills), diet, exercise, and maintaining a healthy weight. If you have questions, please ask. *  If you have a compression pump, aim for at least two hours of use daily.         F/U Appointment is with Dr. Gail Huerta in 1 week, on                                   at                       .     Your nurse  is NELSON Edge. If we applied slip-resistant hospital socks today, be sure to remove them at least once a day to inspect your toes or feet, even if you're not changing the wraps or dressings underneath. If you see anything concerning (redness, excess moisture, etc), please call and let us know right away. Should you experience any significant changes in your wound(s) (including redness, increased warmth, increased pain, increased drainage, odor, or fever) or have questions about your wound care, please contact the [facility] the [facility] at [phone] Monday-Thursday from 8:00 am - 4:30 pm, or Friday from 8:00 am - 2:30 pm.  If you need help with your wound outside these hours and cannot wait until we are again available, contact your home-care company (if applicable), your PCP, or go to the nearest emergency room.

## 2022-11-03 NOTE — DISCHARGE INSTRUCTIONS
[address] [facility] Physician Orders and Discharge 800 [facility]  [address], [address]  [place], [facility]  Telephone: [phone]      Fax: [phone]        Your home care company:   [facility] . Your wound-care supplies will be provided by: [address] [facility] . NAME:  Asmita Swain   YOB: 1931  PRIMARY DIAGNOSIS FOR WOUND CARE CENTER:  Venous leg ulcer . Wound cleansing:   Do not scrub or use excessive force. Wash hands with soap and water before and after dressing changes. Prior to applying a clean dressing, cleanse wound with normal saline, wound cleanser, or mild soap and water. Ask your physician or nurse before getting the wound(s) wet in the shower. Wound care for home:     Right lower leg:  Vashe spray to wound  Triad/PSO to wound  Cover with 4x4's  Foam to anterior ankle  Compri 2 lite compression wrap  Single layer light compression medigrip to help hold dressing in place   Leave in place for the week. Keep clean, dry & intact. Your physician has ordered Compression for treatment of venous ulcers, venous insufficiency,and/or Lymphedema. * Do not remove until your next scheduled visit in [facility]- do not get wet.     * If your wrap falls down, becomes painful or you have decreased sensation, and/or excessive drainage- please call the [facility] for RN visit to get your compression wrap changed   * You need to exercice as tolerated- walking is good   * Elevate your legs preferrably above level of your heart when sitting as much as possible   * The Goal of this therapy is to reduce Edema and get into long term compression garments to control venous insufficiency, lymphedema and reduce occurrence of venous ulcers            Please note, all wounds (unless stated otherwise here) were mechanically debrided at the time of cleansing here in the wound-care center today, so a small amount of pain, drainage or bleeding from that process might be expected, and is normal.      All products for home use, including multiple products for a single wound if applicable, are medically necessary in order to achieve the best chance at timely wound healing. See provider documentation for details if needed. Substituted dressings applied in the [facility] today, if applicable:     N/a     New orders for this week (labs, imaging, medications, etc.):     You may purchase a cast cover at the pharmacy down the [place] to use when bathing to help keep your dressing dry. Continue to wear your compression sleeve on left leg for edema control. You may continue to work with Physical & Occupational therapy. Additional instructions for specific diagnoses:     General comments for venous / lymphedema ulcers: *  Elevate your legs to the level of your heart for at least 30 minutes, several times daily. *  Walk as much as you can tolerate. Avoid standing for long periods of time, but if you must stand, regularly do heel-raises and calf-pump exercises. *  If you have compression wraps designed to remain in place all week, be sure to keep them from getting wet. *  If you have compression garments that can be changed regularly, apply them first thing in the morning, and remove them when you go to bed. Moisturize your skin at bedtime, with Vaseline, Aquaphor, Aveeno, CeraVe, Cetaphil, Eucerin, Lubriderm, etc; but keep the skin between your toes dry. *  If you smoke, your wound can not heal properly -- please talk with us when you're ready to quit. *  Be sure to adhere to any recommendations from your PCP about diuretics (water pills), diet, exercise, and maintaining a healthy weight. If you have questions, please ask. *  If you have a compression pump, aim for at least two hours of use daily.           F/U Appointment is in 1 week for wound reassessment/dressing change, on                                   at .     Your nurse  is NELSON Edge. If we applied slip-resistant hospital socks today, be sure to remove them at least once a day to inspect your toes or feet, even if you're not changing the wraps or dressings underneath. If you see anything concerning (redness, excess moisture, etc), please call and let us know right away. Should you experience any significant changes in your wound(s) (including redness, increased warmth, increased pain, increased drainage, odor, or fever) or have questions about your wound care, please contact the [address] [place] [address] at [phone] Monday-Thursday from 8:00 am - 4:30 pm, or Friday from 8:00 am - 2:30 pm.  If you need help with your wound outside these hours and cannot wait until we are again available, contact your home-care company (if applicable), your PCP, or go to the nearest emergency room.

## 2022-11-09 ENCOUNTER — HOSPITAL ENCOUNTER (OUTPATIENT)
Dept: WOUND CARE | Age: 87
Discharge: HOME | End: 2022-11-09
Payer: MEDICARE

## 2022-11-09 VITALS
HEIGHT: 69 IN | SYSTOLIC BLOOD PRESSURE: 167 MMHG | DIASTOLIC BLOOD PRESSURE: 99 MMHG | TEMPERATURE: 97.5 F | HEART RATE: 66 BPM | BODY MASS INDEX: 23.92 KG/M2 | RESPIRATION RATE: 20 BRPM

## 2022-11-09 DIAGNOSIS — I87.2 VENOUS STASIS ULCER OF RIGHT CALF WITH FAT LAYER EXPOSED WITHOUT VARICOSE VEINS (HCC): Primary | ICD-10-CM

## 2022-11-09 DIAGNOSIS — L97.212 VENOUS STASIS ULCER OF RIGHT CALF WITH FAT LAYER EXPOSED WITHOUT VARICOSE VEINS (HCC): Primary | ICD-10-CM

## 2022-11-09 PROCEDURE — 11042 DBRDMT SUBQ TIS 1ST 20SQCM/<: CPT

## 2022-11-09 PROCEDURE — 29581 APPL MULTLAYER CMPRN SYS LEG: CPT

## 2022-11-09 RX ORDER — BACITRACIN ZINC AND POLYMYXIN B SULFATE 500; 1000 [USP'U]/G; [USP'U]/G
OINTMENT TOPICAL ONCE
OUTPATIENT
Start: 2022-11-09 | End: 2022-11-09

## 2022-11-09 RX ORDER — LIDOCAINE 50 MG/G
OINTMENT TOPICAL ONCE
Status: DISCONTINUED | OUTPATIENT
Start: 2022-11-09 | End: 2022-11-10 | Stop reason: HOSPADM

## 2022-11-09 RX ORDER — LIDOCAINE HYDROCHLORIDE 40 MG/ML
SOLUTION TOPICAL ONCE
OUTPATIENT
Start: 2022-11-09 | End: 2022-11-09

## 2022-11-09 RX ORDER — LIDOCAINE 40 MG/G
CREAM TOPICAL ONCE
Status: DISCONTINUED | OUTPATIENT
Start: 2022-11-09 | End: 2022-11-10 | Stop reason: HOSPADM

## 2022-11-09 RX ORDER — LIDOCAINE HYDROCHLORIDE 40 MG/ML
SOLUTION TOPICAL ONCE
Status: DISCONTINUED | OUTPATIENT
Start: 2022-11-09 | End: 2022-11-10 | Stop reason: HOSPADM

## 2022-11-09 RX ORDER — LIDOCAINE 40 MG/G
CREAM TOPICAL ONCE
OUTPATIENT
Start: 2022-11-09 | End: 2022-11-09

## 2022-11-09 RX ORDER — LIDOCAINE 50 MG/G
OINTMENT TOPICAL ONCE
OUTPATIENT
Start: 2022-11-09 | End: 2022-11-09

## 2022-11-09 RX ORDER — BACITRACIN ZINC AND POLYMYXIN B SULFATE 500; 1000 [USP'U]/G; [USP'U]/G
OINTMENT TOPICAL ONCE
Status: DISCONTINUED | OUTPATIENT
Start: 2022-11-09 | End: 2022-11-10 | Stop reason: HOSPADM

## 2022-11-09 ASSESSMENT — PAIN DESCRIPTION - ORIENTATION: ORIENTATION: RIGHT

## 2022-11-09 ASSESSMENT — PAIN DESCRIPTION - FREQUENCY: FREQUENCY: INTERMITTENT

## 2022-11-09 ASSESSMENT — PAIN DESCRIPTION - ONSET: ONSET: ON-GOING

## 2022-11-09 ASSESSMENT — PAIN DESCRIPTION - PAIN TYPE: TYPE: CHRONIC PAIN

## 2022-11-09 ASSESSMENT — PAIN DESCRIPTION - LOCATION: LOCATION: LEG

## 2022-11-09 ASSESSMENT — PAIN SCALES - GENERAL: PAINLEVEL_OUTOF10: 1

## 2022-11-09 ASSESSMENT — PAIN DESCRIPTION - DESCRIPTORS: DESCRIPTORS: TENDER

## 2022-11-09 NOTE — PLAN OF CARE
Wound stable & showing improvement, debridement per MD & pt. Tolerated well. Will stop Lotrisone d/t fungal rash improved, otherwise cont. With current wound care regime with dressings & compression wrap for edema control as ordered. Reinforced importance of elevation & compression to help with circulation, edema control & wound healing. F/u in [address] [place] [address],[address] in 1 week for wound reassessment/dressing change as ordered, pt. Aware to call sooner with any changes or questions/concerns. Discharge instructions reviewed with patient, all questions answered, copy given to patient. Dressings were applied to all wounds per M.D. Instructions at this visit.

## 2022-11-09 NOTE — PLAN OF CARE
215 [facility] Physician Orders and Discharge 800 [facility]  [address], [address]  [place], [facility]  Telephone: [phone]      Fax: [phone]        Your home care company:   [facility] . Your wound-care supplies will be provided by: [address] [facility] . NAME:  Angeles Hobson   YOB: 1931  PRIMARY DIAGNOSIS FOR WOUND CARE CENTER:  Venous leg ulcer . Wound cleansing:   Do not scrub or use excessive force. Wash hands with soap and water before and after dressing changes. Prior to applying a clean dressing, cleanse wound with normal saline, wound cleanser, or mild soap and water. Ask your physician or nurse before getting the wound(s) wet in the shower. Wound care for home:     Right lower leg:  Vashe spray to wound  Triad/PSO to wound  Cover with 4x4's  Foam to anterior ankle  Compri 2 lite compression wrap  Single layer light compression medigrip to help hold dressing in place   Leave in place for the week. Keep clean, dry & intact. Your physician has ordered Compression for treatment of venous ulcers, venous insufficiency,and/or Lymphedema. * Do not remove until your next scheduled visit in [facility]- do not get wet.     * If your wrap falls down, becomes painful or you have decreased sensation, and/or excessive drainage- please call the [facility] for RN visit to get your compression wrap changed   * You need to exercice as tolerated- walking is good   * Elevate your legs preferrably above level of your heart when sitting as much as possible   * The Goal of this therapy is to reduce Edema and get into long term compression garments to control venous insufficiency, lymphedema and reduce occurrence of venous ulcers            Please note, all wounds (unless stated otherwise here) were mechanically debrided at the time of cleansing here in the wound-care center today, so a small amount of pain, drainage or bleeding from that process might be expected, and is normal.      All products for home use, including multiple products for a single wound if applicable, are medically necessary in order to achieve the best chance at timely wound healing. See provider documentation for details if needed. Substituted dressings applied in the [facility] today, if applicable:     N/a     New orders for this week (labs, imaging, medications, etc.):     You may purchase a cast cover at the pharmacy down the [place] to use when bathing to help keep your dressing dry. Continue to wear your compression sleeve on left leg for edema control. You may continue to work with Physical & Occupational therapy. Additional instructions for specific diagnoses:     General comments for venous / lymphedema ulcers: *  Elevate your legs to the level of your heart for at least 30 minutes, several times daily. *  Walk as much as you can tolerate. Avoid standing for long periods of time, but if you must stand, regularly do heel-raises and calf-pump exercises. *  If you have compression wraps designed to remain in place all week, be sure to keep them from getting wet. *  If you have compression garments that can be changed regularly, apply them first thing in the morning, and remove them when you go to bed. Moisturize your skin at bedtime, with Vaseline, Aquaphor, Aveeno, CeraVe, Cetaphil, Eucerin, Lubriderm, etc; but keep the skin between your toes dry. *  If you smoke, your wound can not heal properly -- please talk with us when you're ready to quit. *  Be sure to adhere to any recommendations from your PCP about diuretics (water pills), diet, exercise, and maintaining a healthy weight. If you have questions, please ask. *  If you have a compression pump, aim for at least two hours of use daily.           F/U Appointment is in 1 week for wound reassessment/dressing change, on                                   at .     Your nurse  is NELSON Edge. If we applied slip-resistant hospital socks today, be sure to remove them at least once a day to inspect your toes or feet, even if you're not changing the wraps or dressings underneath. If you see anything concerning (redness, excess moisture, etc), please call and let us know right away. Should you experience any significant changes in your wound(s) (including redness, increased warmth, increased pain, increased drainage, odor, or fever) or have questions about your wound care, please contact the [address] [place] [address] at [phone] Monday-Thursday from 8:00 am - 4:30 pm, or Friday from 8:00 am - 2:30 pm.  If you need help with your wound outside these hours and cannot wait until we are again available, contact your home-care company (if applicable), your PCP, or go to the nearest emergency room.

## 2022-11-10 NOTE — DISCHARGE INSTRUCTIONS
[address] [facility] Physician Orders and Discharge [address]  [address], [address]  [place]  Telephone: [phone]      Fax: [phone]        Your home care company:   [facility] . Your wound-care supplies will be provided by: [facility] . NAME:  Risa Haq   YOB: 1931  PRIMARY DIAGNOSIS FOR WOUND CARE CENTER:  Venous leg ulcer . Wound cleansing:   Do not scrub or use excessive force. Wash hands with soap and water before and after dressing changes. Prior to applying a clean dressing, cleanse wound with normal saline, wound cleanser, or mild soap and water. Ask your physician or nurse before getting the wound(s) wet in the shower. Wound care for home:     Right lower leg:  Vashe spray to wound  Triad/PSO to wound  Cover with 4x4's  Foam to anterior ankle  Compri 2 lite compression wrap  Single layer light compression medigrip to help hold dressing in place   Leave in place for the week. Keep clean, dry & intact. Your physician has ordered Compression for treatment of venous ulcers, venous insufficiency,and/or Lymphedema. * Do not remove until your next scheduled visit in [address] [place] [address] [address]- do not get wet.     * If your wrap falls down, becomes painful or you have decreased sensation, and/or excessive drainage- please call the [facility],[address] for RN visit to get your compression wrap changed   * You need to exercice as tolerated- walking is good   * Elevate your legs preferrably above level of your heart when sitting as much as possible   * The Goal of this therapy is to reduce Edema and get into long term compression garments to control venous insufficiency, lymphedema and reduce occurrence of venous ulcers            Please note, all wounds (unless stated otherwise here) were mechanically debrided at the time of cleansing here in the wound-care center today, so a small amount of pain, drainage or bleeding from that process might be expected, and is normal.      All products for home use, including multiple products for a single wound if applicable, are medically necessary in order to achieve the best chance at timely wound healing. See provider documentation for details if needed. Substituted dressings applied in the [address] [place] [address],[address] today, if applicable:     N/a     New orders for this week (labs, imaging, medications, etc.):     You may purchase a cast cover at the pharmacy down the [place] to use when bathing to help keep your dressing dry. Continue to wear your compression sleeve on left leg for edema control. You may continue to work with Physical & Occupational therapy. Additional instructions for specific diagnoses:     General comments for venous / lymphedema ulcers: *  Elevate your legs to the level of your heart for at least 30 minutes, several times daily. *  Walk as much as you can tolerate. Avoid standing for long periods of time, but if you must stand, regularly do heel-raises and calf-pump exercises. *  If you have compression wraps designed to remain in place all week, be sure to keep them from getting wet. *  If you have compression garments that can be changed regularly, apply them first thing in the morning, and remove them when you go to bed. Moisturize your skin at bedtime, with Vaseline, Aquaphor, Aveeno, CeraVe, Cetaphil, Eucerin, Lubriderm, etc; but keep the skin between your toes dry. *  If you smoke, your wound can not heal properly -- please talk with us when you're ready to quit. *  Be sure to adhere to any recommendations from your PCP about diuretics (water pills), diet, exercise, and maintaining a healthy weight. If you have questions, please ask. *  If you have a compression pump, aim for at least two hours of use daily.           F/U Appointment is in 1 week , on                                   at                       .     Your nurse  is NELSON Edge. If we applied slip-resistant hospital socks today, be sure to remove them at least once a day to inspect your toes or feet, even if you're not changing the wraps or dressings underneath. If you see anything concerning (redness, excess moisture, etc), please call and let us know right away. Should you experience any significant changes in your wound(s) (including redness, increased warmth, increased pain, increased drainage, odor, or fever) or have questions about your wound care, please contact the [employer] at [phone] Monday-Thursday from 8:00 am - 4:30 pm, or Friday from 8:00 am - 2:30 pm.  If you need help with your wound outside these hours and cannot wait until we are again available, contact your home-care company (if applicable), your PCP, or go to the nearest emergency room.

## 2022-11-16 ENCOUNTER — HOSPITAL ENCOUNTER (OUTPATIENT)
Dept: WOUND CARE | Age: 87
Discharge: HOME | DRG: 291 | End: 2022-11-16
Payer: MEDICARE

## 2022-11-16 VITALS
HEIGHT: 69 IN | BODY MASS INDEX: 23.92 KG/M2 | HEART RATE: 88 BPM | DIASTOLIC BLOOD PRESSURE: 67 MMHG | TEMPERATURE: 97.5 F | RESPIRATION RATE: 20 BRPM | SYSTOLIC BLOOD PRESSURE: 158 MMHG

## 2022-11-16 DIAGNOSIS — I87.2 VENOUS STASIS ULCER OF RIGHT CALF WITH FAT LAYER EXPOSED WITHOUT VARICOSE VEINS (HCC): Primary | ICD-10-CM

## 2022-11-16 DIAGNOSIS — L97.212 VENOUS STASIS ULCER OF RIGHT CALF WITH FAT LAYER EXPOSED WITHOUT VARICOSE VEINS (HCC): Primary | ICD-10-CM

## 2022-11-16 PROCEDURE — 2W1QX6Z COMPRESSION OF RIGHT LOWER LEG USING PRESSURE DRESSING: ICD-10-PCS | Performed by: INTERNAL MEDICINE

## 2022-11-16 PROCEDURE — 29581 APPL MULTLAYER CMPRN SYS LEG: CPT

## 2022-11-16 RX ORDER — BACITRACIN ZINC AND POLYMYXIN B SULFATE 500; 1000 [USP'U]/G; [USP'U]/G
OINTMENT TOPICAL ONCE
OUTPATIENT
Start: 2022-11-16 | End: 2022-11-16

## 2022-11-16 RX ORDER — LIDOCAINE HYDROCHLORIDE 40 MG/ML
SOLUTION TOPICAL ONCE
OUTPATIENT
Start: 2022-11-16 | End: 2022-11-16

## 2022-11-16 RX ORDER — LIDOCAINE 40 MG/G
CREAM TOPICAL ONCE
OUTPATIENT
Start: 2022-11-16 | End: 2022-11-16

## 2022-11-16 RX ORDER — LIDOCAINE 50 MG/G
OINTMENT TOPICAL ONCE
OUTPATIENT
Start: 2022-11-16 | End: 2022-11-16

## 2022-11-16 ASSESSMENT — PAIN DESCRIPTION - LOCATION: LOCATION: LEG

## 2022-11-16 ASSESSMENT — PAIN SCALES - GENERAL: PAINLEVEL_OUTOF10: 2

## 2022-11-16 ASSESSMENT — PAIN DESCRIPTION - ORIENTATION: ORIENTATION: RIGHT

## 2022-11-16 ASSESSMENT — PAIN - FUNCTIONAL ASSESSMENT: PAIN_FUNCTIONAL_ASSESSMENT: ACTIVITIES ARE NOT PREVENTED

## 2022-11-16 ASSESSMENT — PAIN DESCRIPTION - ONSET: ONSET: ON-GOING

## 2022-11-16 ASSESSMENT — PAIN DESCRIPTION - FREQUENCY: FREQUENCY: INTERMITTENT

## 2022-11-16 ASSESSMENT — PAIN DESCRIPTION - DESCRIPTORS: DESCRIPTORS: TENDER;SHARP

## 2022-11-16 ASSESSMENT — PAIN DESCRIPTION - PAIN TYPE: TYPE: CHRONIC PAIN

## 2022-11-16 NOTE — PLAN OF CARE
Patient seen in [facility] for wound reassessment today. Patient complains of pain 2/10. States wound feels tender and the pain is sharp at times. Wrap removed. Wounds cleansed. Patient tolerated well. Wound showing signs of healing. Felt placed by MD last week remains in place. Wraps replaced, per MD order. F/u in [facility] next week as ordered, pt. Aware to call sooner with any changes or questions/concerns. Discharge instructions reviewed with patient, all questions answered, copy given to patient. Dressings were applied to all wounds per M.D. Instructions at this visit.

## 2022-11-16 NOTE — DISCHARGE INSTRUCTIONS
215 [facility] Physician Orders and Discharge [address] [facility]  [address], [address]  [place], [facility]  Telephone: [phone]      Fax: [phone]        Your home care company:   [facility] . Your wound-care supplies will be provided by: [facility] . NAME:  James Benoit   YOB: 1931  PRIMARY DIAGNOSIS FOR WOUND CARE CENTER:  Venous leg ulcer . Wound cleansing:   Do not scrub or use excessive force. Wash hands with soap and water before and after dressing changes. Prior to applying a clean dressing, cleanse wound with normal saline, wound cleanser, or mild soap and water. Ask your physician or nurse before getting the wound(s) wet in the shower. Wound care for home:     Right lower leg:  Vashe spray to wound  Triad/PSO to wound  Cover with 4x4's  Foam to anterior ankle  Compri 2 lite compression wrap  Single layer light compression medigrip to help hold dressing in place   Leave in place for the week. Keep clean, dry & intact. Your physician has ordered Compression for treatment of venous ulcers, venous insufficiency,and/or Lymphedema. * Do not remove until your next scheduled visit in [facility]- do not get wet.     * If your wrap falls down, becomes painful or you have decreased sensation, and/or excessive drainage- please call the [facility] for RN visit to get your compression wrap changed   * You need to exercice as tolerated- walking is good   * Elevate your legs preferrably above level of your heart when sitting as much as possible   * The Goal of this therapy is to reduce Edema and get into long term compression garments to control venous insufficiency, lymphedema and reduce occurrence of venous ulcers            Please note, all wounds (unless stated otherwise here) were mechanically debrided at the time of cleansing here in the wound-care center today, so a small amount of pain, drainage or bleeding from that process might be expected, and is normal.      All products for home use, including multiple products for a single wound if applicable, are medically necessary in order to achieve the best chance at timely wound healing. See provider documentation for details if needed. Substituted dressings applied in the [facility] today, if applicable:     N/a     New orders for this week (labs, imaging, medications, etc.):     You may purchase a cast cover at the pharmacy down the [address] to use when bathing to help keep your dressing dry. Continue to wear your compression sleeve on left leg for edema control. You may continue to work with Physical & Occupational therapy. Additional instructions for specific diagnoses:     General comments for venous / lymphedema ulcers: *  Elevate your legs to the level of your heart for at least 30 minutes, several times daily. *  Walk as much as you can tolerate. Avoid standing for long periods of time, but if you must stand, regularly do heel-raises and calf-pump exercises. *  If you have compression wraps designed to remain in place all week, be sure to keep them from getting wet. *  If you have compression garments that can be changed regularly, apply them first thing in the morning, and remove them when you go to bed. Moisturize your skin at bedtime, with Vaseline, Aquaphor, Aveeno, CeraVe, Cetaphil, Eucerin, Lubriderm, etc; but keep the skin between your toes dry. *  If you smoke, your wound can not heal properly -- please talk with us when you're ready to quit. *  Be sure to adhere to any recommendations from your PCP about diuretics (water pills), diet, exercise, and maintaining a healthy weight. If you have questions, please ask. *  If you have a compression pump, aim for at least two hours of use daily.           F/U Appointment is  with Dr. Jed Faulkner in 1 week  on                                   at                       .     Your nurse  is NELSON Edge. If we applied slip-resistant hospital socks today, be sure to remove them at least once a day to inspect your toes or feet, even if you're not changing the wraps or dressings underneath. If you see anything concerning (redness, excess moisture, etc), please call and let us know right away. Should you experience any significant changes in your wound(s) (including redness, increased warmth, increased pain, increased drainage, odor, or fever) or have questions about your wound care, please contact the [address] [place] [address] at [phone] Monday-Thursday from 8:00 am - 4:30 pm, or Friday from 8:00 am - 2:30 pm.  If you need help with your wound outside these hours and cannot wait until we are again available, contact your home-care company (if applicable), your PCP, or go to the nearest emergency room.

## 2022-11-16 NOTE — PLAN OF CARE
215 [facility] Physician Orders and Discharge [address] [facility]  [address], [address]  [place], [facility]  Telephone: [phone]      Fax: [phone]        Your home care company:   [facility] . Your wound-care supplies will be provided by: [address] [facility] . NAME:  Dwayne Blackman   YOB: 1931  PRIMARY DIAGNOSIS FOR WOUND CARE CENTER:  Venous leg ulcer . Wound cleansing:   Do not scrub or use excessive force. Wash hands with soap and water before and after dressing changes. Prior to applying a clean dressing, cleanse wound with normal saline, wound cleanser, or mild soap and water. Ask your physician or nurse before getting the wound(s) wet in the shower. Wound care for home:     Right lower leg:  Vashe spray to wound  Triad/PSO to wound  Cover with 4x4's  Foam to anterior ankle  Compri 2 lite compression wrap  Single layer light compression medigrip to help hold dressing in place   Leave in place for the week. Keep clean, dry & intact. Your physician has ordered Compression for treatment of venous ulcers, venous insufficiency,and/or Lymphedema. * Do not remove until your next scheduled visit in [facility]- do not get wet.     * If your wrap falls down, becomes painful or you have decreased sensation, and/or excessive drainage- please call the [facility] for RN visit to get your compression wrap changed   * You need to exercice as tolerated- walking is good   * Elevate your legs preferrably above level of your heart when sitting as much as possible   * The Goal of this therapy is to reduce Edema and get into long term compression garments to control venous insufficiency, lymphedema and reduce occurrence of venous ulcers            Please note, all wounds (unless stated otherwise here) were mechanically debrided at the time of cleansing here in the wound-care center today, so a small amount of pain, drainage or bleeding from that process might be expected, and is normal.      All products for home use, including multiple products for a single wound if applicable, are medically necessary in order to achieve the best chance at timely wound healing. See provider documentation for details if needed. Substituted dressings applied in the [address] [place] [address],[address] today, if applicable:     N/a     New orders for this week (labs, imaging, medications, etc.):     You may purchase a cast cover at the pharmacy down the [address] to use when bathing to help keep your dressing dry. Continue to wear your compression sleeve on left leg for edema control. You may continue to work with Physical & Occupational therapy. Additional instructions for specific diagnoses:     General comments for venous / lymphedema ulcers: *  Elevate your legs to the level of your heart for at least 30 minutes, several times daily. *  Walk as much as you can tolerate. Avoid standing for long periods of time, but if you must stand, regularly do heel-raises and calf-pump exercises. *  If you have compression wraps designed to remain in place all week, be sure to keep them from getting wet. *  If you have compression garments that can be changed regularly, apply them first thing in the morning, and remove them when you go to bed. Moisturize your skin at bedtime, with Vaseline, Aquaphor, Aveeno, CeraVe, Cetaphil, Eucerin, Lubriderm, etc; but keep the skin between your toes dry. *  If you smoke, your wound can not heal properly -- please talk with us when you're ready to quit. *  Be sure to adhere to any recommendations from your PCP about diuretics (water pills), diet, exercise, and maintaining a healthy weight. If you have questions, please ask. *  If you have a compression pump, aim for at least two hours of use daily.           F/U Appointment is in 1 week , on                                   at                       .     Your nurse  is NELSON Edge. If we applied slip-resistant hospital socks today, be sure to remove them at least once a day to inspect your toes or feet, even if you're not changing the wraps or dressings underneath. If you see anything concerning (redness, excess moisture, etc), please call and let us know right away.      Should you experience any significant changes in your wound(s) (including redness, increased warmth, increased pain, increased drainage, odor, or fever) or have questions about your wound care, please contact the [employer] at [phone] Monday-Thursday from 8:00 am - 4:30 pm, or Friday from 8:00 am - 2:30 pm.  If you need help with your wound outside these hours and cannot wait until we are again available, contact your home-care company (if applicable), your PCP, or go to the nearest emergency room

## 2022-11-18 ENCOUNTER — APPOINTMENT (OUTPATIENT)
Dept: GENERAL RADIOLOGY | Age: 87
DRG: 291 | End: 2022-11-18
Payer: MEDICARE

## 2022-11-18 ENCOUNTER — HOSPITAL ENCOUNTER (INPATIENT)
Age: 87
LOS: 5 days | Discharge: NURSING, REHAB OR LONG-TERM CARE | DRG: 291 | End: 2022-11-23
Attending: EMERGENCY MEDICINE | Admitting: INTERNAL MEDICINE
Payer: MEDICARE

## 2022-11-18 ENCOUNTER — NURSE ONLY (OUTPATIENT)
Dept: CARDIOLOGY CLINIC | Age: 87
End: 2022-11-18

## 2022-11-18 ENCOUNTER — APPOINTMENT (OUTPATIENT)
Dept: CT IMAGING | Age: 87
DRG: 291 | End: 2022-11-18
Payer: MEDICARE

## 2022-11-18 ENCOUNTER — NURSE ONLY (OUTPATIENT)
Dept: CARDIOLOGY CLINIC | Age: 87
End: 2022-11-18
Payer: MEDICARE

## 2022-11-18 DIAGNOSIS — R00.1 BRADYCARDIA: ICD-10-CM

## 2022-11-18 DIAGNOSIS — J90 PLEURAL EFFUSION ON RIGHT: ICD-10-CM

## 2022-11-18 DIAGNOSIS — R79.89 ELEVATED BRAIN NATRIURETIC PEPTIDE (BNP) LEVEL: ICD-10-CM

## 2022-11-18 DIAGNOSIS — Z95.0 PACEMAKER: Primary | ICD-10-CM

## 2022-11-18 DIAGNOSIS — I49.5 SICK SINUS SYNDROME (HCC): ICD-10-CM

## 2022-11-18 DIAGNOSIS — J96.01 ACUTE RESPIRATORY FAILURE WITH HYPOXIA (HCC): Primary | ICD-10-CM

## 2022-11-18 LAB
A/G RATIO: 1 (ref 1.1–2.2)
ALBUMIN SERPL-MCNC: 3.5 G/DL (ref 3.4–5)
ALP BLD-CCNC: 105 U/L (ref 40–129)
ALT SERPL-CCNC: 19 U/L (ref 10–40)
ANION GAP SERPL CALCULATED.3IONS-SCNC: 6 MMOL/L (ref 3–16)
AST SERPL-CCNC: 22 U/L (ref 15–37)
BACTERIA: ABNORMAL /HPF
BASE EXCESS VENOUS: -0.2 MMOL/L (ref -3–3)
BASE EXCESS VENOUS: 1.9 MMOL/L (ref -3–3)
BASOPHILS ABSOLUTE: 0 K/UL (ref 0–0.2)
BASOPHILS RELATIVE PERCENT: 0.3 %
BILIRUB SERPL-MCNC: 0.7 MG/DL (ref 0–1)
BILIRUBIN URINE: NEGATIVE
BLOOD, URINE: ABNORMAL
BUN BLDV-MCNC: 24 MG/DL (ref 7–20)
CALCIUM SERPL-MCNC: 8.5 MG/DL (ref 8.3–10.6)
CARBOXYHEMOGLOBIN: 2.3 % (ref 0–1.5)
CARBOXYHEMOGLOBIN: 2.3 % (ref 0–1.5)
CHLORIDE BLD-SCNC: 100 MMOL/L (ref 99–110)
CLARITY: ABNORMAL
CO2: 31 MMOL/L (ref 21–32)
COLOR: YELLOW
COMMENT UA: ABNORMAL
CREAT SERPL-MCNC: 0.8 MG/DL (ref 0.8–1.3)
EOSINOPHILS ABSOLUTE: 0 K/UL (ref 0–0.6)
EOSINOPHILS RELATIVE PERCENT: 0 %
GFR SERPL CREATININE-BSD FRML MDRD: >60 ML/MIN/{1.73_M2}
GLUCOSE BLD-MCNC: 119 MG/DL (ref 70–99)
GLUCOSE URINE: NEGATIVE MG/DL
HCO3 VENOUS: 27 MMOL/L (ref 23–29)
HCO3 VENOUS: 28.5 MMOL/L (ref 23–29)
HCT VFR BLD CALC: 35.6 % (ref 40.5–52.5)
HEMOGLOBIN: 12.1 G/DL (ref 13.5–17.5)
INFLUENZA A: NOT DETECTED
INFLUENZA B: NOT DETECTED
INR BLD: 3.75 (ref 0.87–1.14)
KETONES, URINE: NEGATIVE MG/DL
LACTIC ACID, SEPSIS: 1.3 MMOL/L (ref 0.4–1.9)
LEUKOCYTE ESTERASE, URINE: NEGATIVE
LYMPHOCYTES ABSOLUTE: 0.9 K/UL (ref 1–5.1)
LYMPHOCYTES RELATIVE PERCENT: 14.7 %
MCH RBC QN AUTO: 32.3 PG (ref 26–34)
MCHC RBC AUTO-ENTMCNC: 34 G/DL (ref 31–36)
MCV RBC AUTO: 95 FL (ref 80–100)
METHEMOGLOBIN VENOUS: 0 %
METHEMOGLOBIN VENOUS: 0.3 %
MICROSCOPIC EXAMINATION: YES
MONOCYTES ABSOLUTE: 0.5 K/UL (ref 0–1.3)
MONOCYTES RELATIVE PERCENT: 8.6 %
MUCUS: ABNORMAL /LPF
NEUTROPHILS ABSOLUTE: 4.5 K/UL (ref 1.7–7.7)
NEUTROPHILS RELATIVE PERCENT: 76.4 %
NITRITE, URINE: NEGATIVE
O2 CONTENT, VEN: 13 VOL %
O2 SAT, VEN: 73 %
O2 SAT, VEN: 99 %
O2 THERAPY: ABNORMAL
O2 THERAPY: ABNORMAL
PCO2, VEN: 54.5 MMHG (ref 40–50)
PCO2, VEN: 55.2 MMHG (ref 40–50)
PDW BLD-RTO: 16.6 % (ref 12.4–15.4)
PH UA: 6 (ref 5–8)
PH VENOUS: 7.31 (ref 7.35–7.45)
PH VENOUS: 7.34 (ref 7.35–7.45)
PLATELET # BLD: 36 K/UL (ref 135–450)
PLATELET SLIDE REVIEW: ADEQUATE
PMV BLD AUTO: 9.8 FL (ref 5–10.5)
PO2, VEN: 161.2 MMHG (ref 25–40)
PO2, VEN: 42.3 MMHG (ref 25–40)
POTASSIUM REFLEX MAGNESIUM: 4.3 MMOL/L (ref 3.5–5.1)
PRO-BNP: 6860 PG/ML (ref 0–449)
PROTEIN UA: 100 MG/DL
PROTHROMBIN TIME: 37.1 SEC (ref 11.7–14.5)
RBC # BLD: 3.75 M/UL (ref 4.2–5.9)
RBC UA: >100 /HPF (ref 0–4)
SARS-COV-2 RNA, RT PCR: NOT DETECTED
SLIDE REVIEW: ABNORMAL
SODIUM BLD-SCNC: 137 MMOL/L (ref 136–145)
SPECIFIC GRAVITY UA: >=1.03 (ref 1–1.03)
TCO2 CALC VENOUS: 29 MMOL/L
TCO2 CALC VENOUS: 30 MMOL/L
TOTAL PROTEIN: 6.9 G/DL (ref 6.4–8.2)
TROPONIN: 0.04 NG/ML
URINE REFLEX TO CULTURE: YES
URINE TYPE: ABNORMAL
UROBILINOGEN, URINE: 0.2 E.U./DL
WBC # BLD: 5.9 K/UL (ref 4–11)
WBC UA: ABNORMAL /HPF (ref 0–5)

## 2022-11-18 PROCEDURE — 85025 COMPLETE CBC W/AUTO DIFF WBC: CPT

## 2022-11-18 PROCEDURE — 82803 BLOOD GASES ANY COMBINATION: CPT

## 2022-11-18 PROCEDURE — 36415 COLL VENOUS BLD VENIPUNCTURE: CPT

## 2022-11-18 PROCEDURE — 6360000002 HC RX W HCPCS

## 2022-11-18 PROCEDURE — 74174 CTA ABD&PLVS W/CONTRAST: CPT

## 2022-11-18 PROCEDURE — 94761 N-INVAS EAR/PLS OXIMETRY MLT: CPT

## 2022-11-18 PROCEDURE — 71045 X-RAY EXAM CHEST 1 VIEW: CPT

## 2022-11-18 PROCEDURE — 6360000002 HC RX W HCPCS: Performed by: EMERGENCY MEDICINE

## 2022-11-18 PROCEDURE — 6360000004 HC RX CONTRAST MEDICATION: Performed by: PHYSICIAN ASSISTANT

## 2022-11-18 PROCEDURE — 83605 ASSAY OF LACTIC ACID: CPT

## 2022-11-18 PROCEDURE — 85610 PROTHROMBIN TIME: CPT

## 2022-11-18 PROCEDURE — 80053 COMPREHEN METABOLIC PANEL: CPT

## 2022-11-18 PROCEDURE — 93288 INTERROG EVL PM/LDLS PM IP: CPT | Performed by: INTERNAL MEDICINE

## 2022-11-18 PROCEDURE — 2060000000 HC ICU INTERMEDIATE R&B

## 2022-11-18 PROCEDURE — 2700000000 HC OXYGEN THERAPY PER DAY

## 2022-11-18 PROCEDURE — 51702 INSERT TEMP BLADDER CATH: CPT

## 2022-11-18 PROCEDURE — 96374 THER/PROPH/DIAG INJ IV PUSH: CPT

## 2022-11-18 PROCEDURE — 81003 URINALYSIS AUTO W/O SCOPE: CPT

## 2022-11-18 PROCEDURE — 87040 BLOOD CULTURE FOR BACTERIA: CPT

## 2022-11-18 PROCEDURE — 87086 URINE CULTURE/COLONY COUNT: CPT

## 2022-11-18 PROCEDURE — 99285 EMERGENCY DEPT VISIT HI MDM: CPT

## 2022-11-18 PROCEDURE — 84484 ASSAY OF TROPONIN QUANT: CPT

## 2022-11-18 PROCEDURE — 87636 SARSCOV2 & INF A&B AMP PRB: CPT

## 2022-11-18 PROCEDURE — 83880 ASSAY OF NATRIURETIC PEPTIDE: CPT

## 2022-11-18 PROCEDURE — 93005 ELECTROCARDIOGRAM TRACING: CPT | Performed by: PHYSICIAN ASSISTANT

## 2022-11-18 RX ORDER — FUROSEMIDE 10 MG/ML
40 INJECTION INTRAMUSCULAR; INTRAVENOUS 2 TIMES DAILY
Status: DISCONTINUED | OUTPATIENT
Start: 2022-11-19 | End: 2022-11-22

## 2022-11-18 RX ORDER — ONDANSETRON 4 MG/1
4 TABLET, ORALLY DISINTEGRATING ORAL EVERY 8 HOURS PRN
Status: DISCONTINUED | OUTPATIENT
Start: 2022-11-18 | End: 2022-11-18

## 2022-11-18 RX ORDER — SODIUM CHLORIDE 9 MG/ML
INJECTION, SOLUTION INTRAVENOUS PRN
Status: DISCONTINUED | OUTPATIENT
Start: 2022-11-18 | End: 2022-11-23 | Stop reason: HOSPADM

## 2022-11-18 RX ORDER — SODIUM CHLORIDE 0.9 % (FLUSH) 0.9 %
5-40 SYRINGE (ML) INJECTION PRN
Status: DISCONTINUED | OUTPATIENT
Start: 2022-11-18 | End: 2022-11-23 | Stop reason: HOSPADM

## 2022-11-18 RX ORDER — ONDANSETRON 2 MG/ML
4 INJECTION INTRAMUSCULAR; INTRAVENOUS EVERY 6 HOURS PRN
Status: DISCONTINUED | OUTPATIENT
Start: 2022-11-18 | End: 2022-11-18

## 2022-11-18 RX ORDER — WARFARIN SODIUM 5 MG/1
5 TABLET ORAL DAILY
Status: DISCONTINUED | OUTPATIENT
Start: 2022-11-19 | End: 2022-11-19

## 2022-11-18 RX ORDER — POLYETHYLENE GLYCOL 3350 17 G/17G
17 POWDER, FOR SOLUTION ORAL DAILY PRN
Status: DISCONTINUED | OUTPATIENT
Start: 2022-11-18 | End: 2022-11-23 | Stop reason: HOSPADM

## 2022-11-18 RX ORDER — SODIUM CHLORIDE 0.9 % (FLUSH) 0.9 %
5-40 SYRINGE (ML) INJECTION EVERY 12 HOURS SCHEDULED
Status: DISCONTINUED | OUTPATIENT
Start: 2022-11-18 | End: 2022-11-23 | Stop reason: HOSPADM

## 2022-11-18 RX ORDER — ATROPINE SULFATE 1 MG/ML
INJECTION, SOLUTION INTRAMUSCULAR; INTRAVENOUS; SUBCUTANEOUS
Status: DISPENSED
Start: 2022-11-18 | End: 2022-11-19

## 2022-11-18 RX ORDER — ATORVASTATIN CALCIUM 10 MG/1
20 TABLET, FILM COATED ORAL DAILY
Status: DISCONTINUED | OUTPATIENT
Start: 2022-11-19 | End: 2022-11-23 | Stop reason: HOSPADM

## 2022-11-18 RX ORDER — FUROSEMIDE 10 MG/ML
40 INJECTION INTRAMUSCULAR; INTRAVENOUS ONCE
Status: COMPLETED | OUTPATIENT
Start: 2022-11-18 | End: 2022-11-18

## 2022-11-18 RX ORDER — ACETAMINOPHEN 325 MG/1
650 TABLET ORAL EVERY 6 HOURS PRN
Status: DISCONTINUED | OUTPATIENT
Start: 2022-11-18 | End: 2022-11-23 | Stop reason: HOSPADM

## 2022-11-18 RX ORDER — FINASTERIDE 5 MG/1
5 TABLET, FILM COATED ORAL DAILY
Status: DISCONTINUED | OUTPATIENT
Start: 2022-11-19 | End: 2022-11-23 | Stop reason: HOSPADM

## 2022-11-18 RX ORDER — ACETAMINOPHEN 650 MG/1
650 SUPPOSITORY RECTAL EVERY 6 HOURS PRN
Status: DISCONTINUED | OUTPATIENT
Start: 2022-11-18 | End: 2022-11-23 | Stop reason: HOSPADM

## 2022-11-18 RX ORDER — ATROPINE SULFATE 0.1 MG/ML
INJECTION INTRAVENOUS
Status: COMPLETED
Start: 2022-11-18 | End: 2022-11-18

## 2022-11-18 RX ADMIN — ATROPINE SULFATE 0.5 MG: 0.1 INJECTION, SOLUTION INTRAVENOUS at 19:14

## 2022-11-18 RX ADMIN — IOPAMIDOL 85 ML: 755 INJECTION, SOLUTION INTRAVENOUS at 19:59

## 2022-11-18 RX ADMIN — FUROSEMIDE 40 MG: 10 INJECTION, SOLUTION INTRAMUSCULAR; INTRAVENOUS at 21:14

## 2022-11-18 ASSESSMENT — LIFESTYLE VARIABLES: HOW OFTEN DO YOU HAVE A DRINK CONTAINING ALCOHOL: NEVER

## 2022-11-18 ASSESSMENT — PAIN SCALES - GENERAL: PAINLEVEL_OUTOF10: 5

## 2022-11-18 ASSESSMENT — PAIN - FUNCTIONAL ASSESSMENT: PAIN_FUNCTIONAL_ASSESSMENT: NONE - DENIES PAIN

## 2022-11-19 PROBLEM — I49.5 SSS (SICK SINUS SYNDROME) (HCC): Status: ACTIVE | Noted: 2022-11-19

## 2022-11-19 PROBLEM — J90 PLEURAL EFFUSION ON RIGHT: Status: ACTIVE | Noted: 2022-11-19

## 2022-11-19 PROBLEM — Z95.0 PACEMAKER: Status: ACTIVE | Noted: 2022-11-19

## 2022-11-19 PROBLEM — G93.41 METABOLIC ENCEPHALOPATHY: Status: ACTIVE | Noted: 2022-11-19

## 2022-11-19 PROBLEM — J98.11 ATELECTASIS: Status: ACTIVE | Noted: 2022-11-19

## 2022-11-19 PROBLEM — R00.1 BRADYCARDIA: Status: ACTIVE | Noted: 2022-11-19

## 2022-11-19 LAB
A/G RATIO: 1.2 (ref 1.1–2.2)
ALBUMIN SERPL-MCNC: 3 G/DL (ref 3.4–5)
ALP BLD-CCNC: 85 U/L (ref 40–129)
ALT SERPL-CCNC: 16 U/L (ref 10–40)
ANION GAP SERPL CALCULATED.3IONS-SCNC: 6 MMOL/L (ref 3–16)
ANISOCYTOSIS: ABNORMAL
AST SERPL-CCNC: 20 U/L (ref 15–37)
BASOPHILS ABSOLUTE: 0 K/UL (ref 0–0.2)
BASOPHILS RELATIVE PERCENT: 0.4 %
BILIRUB SERPL-MCNC: 0.5 MG/DL (ref 0–1)
BUN BLDV-MCNC: 23 MG/DL (ref 7–20)
CALCIUM SERPL-MCNC: 9 MG/DL (ref 8.3–10.6)
CHLORIDE BLD-SCNC: 96 MMOL/L (ref 99–110)
CO2: 31 MMOL/L (ref 21–32)
CREAT SERPL-MCNC: 0.9 MG/DL (ref 0.8–1.3)
EKG ATRIAL RATE: 70 BPM
EKG ATRIAL RATE: 83 BPM
EKG DIAGNOSIS: NORMAL
EKG DIAGNOSIS: NORMAL
EKG P-R INTERVAL: 108 MS
EKG P-R INTERVAL: 176 MS
EKG Q-T INTERVAL: 452 MS
EKG Q-T INTERVAL: 474 MS
EKG QRS DURATION: 152 MS
EKG QRS DURATION: 154 MS
EKG QTC CALCULATION (BAZETT): 511 MS
EKG QTC CALCULATION (BAZETT): 531 MS
EKG R AXIS: 100 DEGREES
EKG R AXIS: 99 DEGREES
EKG T AXIS: -74 DEGREES
EKG T AXIS: -83 DEGREES
EKG VENTRICULAR RATE: 70 BPM
EKG VENTRICULAR RATE: 83 BPM
EOSINOPHILS ABSOLUTE: 0 K/UL (ref 0–0.6)
EOSINOPHILS RELATIVE PERCENT: 0 %
GFR SERPL CREATININE-BSD FRML MDRD: >60 ML/MIN/{1.73_M2}
GLUCOSE BLD-MCNC: 96 MG/DL (ref 70–99)
HCT VFR BLD CALC: 31.2 % (ref 40.5–52.5)
HEMOGLOBIN: 10.6 G/DL (ref 13.5–17.5)
INR BLD: 3.95 (ref 0.87–1.14)
INR BLD: 4.47 (ref 0.87–1.14)
LYMPHOCYTES ABSOLUTE: 0.9 K/UL (ref 1–5.1)
LYMPHOCYTES RELATIVE PERCENT: 14.6 %
MCH RBC QN AUTO: 32.3 PG (ref 26–34)
MCHC RBC AUTO-ENTMCNC: 34.1 G/DL (ref 31–36)
MCV RBC AUTO: 94.8 FL (ref 80–100)
MONOCYTES ABSOLUTE: 0.7 K/UL (ref 0–1.3)
MONOCYTES RELATIVE PERCENT: 10.6 %
NEUTROPHILS ABSOLUTE: 4.8 K/UL (ref 1.7–7.7)
NEUTROPHILS RELATIVE PERCENT: 74.4 %
PDW BLD-RTO: 16.5 % (ref 12.4–15.4)
PLATELET # BLD: 60 K/UL (ref 135–450)
PLATELET SLIDE REVIEW: ABNORMAL
PMV BLD AUTO: 14.8 FL (ref 5–10.5)
POIKILOCYTES: ABNORMAL
POTASSIUM REFLEX MAGNESIUM: 4.2 MMOL/L (ref 3.5–5.1)
PROTHROMBIN TIME: 38.7 SEC (ref 11.7–14.5)
PROTHROMBIN TIME: 42.7 SEC (ref 11.7–14.5)
RBC # BLD: 3.29 M/UL (ref 4.2–5.9)
SLIDE REVIEW: ABNORMAL
SODIUM BLD-SCNC: 133 MMOL/L (ref 136–145)
TOTAL PROTEIN: 5.5 G/DL (ref 6.4–8.2)
WBC # BLD: 6.5 K/UL (ref 4–11)

## 2022-11-19 PROCEDURE — 85610 PROTHROMBIN TIME: CPT

## 2022-11-19 PROCEDURE — 80053 COMPREHEN METABOLIC PANEL: CPT

## 2022-11-19 PROCEDURE — 85025 COMPLETE CBC W/AUTO DIFF WBC: CPT

## 2022-11-19 PROCEDURE — 92526 ORAL FUNCTION THERAPY: CPT

## 2022-11-19 PROCEDURE — 99233 SBSQ HOSP IP/OBS HIGH 50: CPT | Performed by: INTERNAL MEDICINE

## 2022-11-19 PROCEDURE — 92610 EVALUATE SWALLOWING FUNCTION: CPT

## 2022-11-19 PROCEDURE — 93010 ELECTROCARDIOGRAM REPORT: CPT | Performed by: INTERNAL MEDICINE

## 2022-11-19 PROCEDURE — 36415 COLL VENOUS BLD VENIPUNCTURE: CPT

## 2022-11-19 PROCEDURE — 99223 1ST HOSP IP/OBS HIGH 75: CPT | Performed by: INTERNAL MEDICINE

## 2022-11-19 PROCEDURE — 2700000000 HC OXYGEN THERAPY PER DAY

## 2022-11-19 PROCEDURE — 94761 N-INVAS EAR/PLS OXIMETRY MLT: CPT

## 2022-11-19 PROCEDURE — 2060000000 HC ICU INTERMEDIATE R&B

## 2022-11-19 PROCEDURE — 2580000003 HC RX 258: Performed by: INTERNAL MEDICINE

## 2022-11-19 PROCEDURE — 6370000000 HC RX 637 (ALT 250 FOR IP): Performed by: INTERNAL MEDICINE

## 2022-11-19 PROCEDURE — 6360000002 HC RX W HCPCS: Performed by: INTERNAL MEDICINE

## 2022-11-19 RX ADMIN — PHYTONADIONE 10 MG: 10 INJECTION, EMULSION INTRAMUSCULAR; INTRAVENOUS; SUBCUTANEOUS at 04:04

## 2022-11-19 RX ADMIN — FUROSEMIDE 40 MG: 10 INJECTION, SOLUTION INTRAMUSCULAR; INTRAVENOUS at 09:24

## 2022-11-19 RX ADMIN — FUROSEMIDE 40 MG: 10 INJECTION, SOLUTION INTRAMUSCULAR; INTRAVENOUS at 18:19

## 2022-11-19 RX ADMIN — ATORVASTATIN CALCIUM 20 MG: 10 TABLET, FILM COATED ORAL at 09:24

## 2022-11-19 RX ADMIN — Medication 10 ML: at 20:39

## 2022-11-19 RX ADMIN — Medication 10 ML: at 09:25

## 2022-11-19 RX ADMIN — FINASTERIDE 5 MG: 5 TABLET, FILM COATED ORAL at 09:24

## 2022-11-19 ASSESSMENT — ENCOUNTER SYMPTOMS
VOMITING: 0
COUGH: 0
ABDOMINAL PAIN: 0
SHORTNESS OF BREATH: 1

## 2022-11-19 NOTE — ED PROVIDER NOTES
[address]        Pt Name: Guillermo Gomez  MRN: [id number]  [place] 7/11/1931  Date of evaluation: 11/18/2022  Provider: Pierce Lipscomb PA-C  PCP: Hermes Guillermo MD    Shared Visit or Autonomous Visit:  I have seen and evaluated this patient with my supervising physician Dr Oscar Suárez       Chief Complaint   Patient presents with    Shortness of Breath     Worse and worse SOB EMS reports sat 79% RA upon arrival. Pt is 96% on 3 liters at triage. HISTORY OF PRESENT ILLNESS   (Location/Symptom, Timing/Onset, Context/Setting, Quality, Duration, Modifying Factors, Severity)  Note limiting factors. Guillermo Gomez is a 80 y.o. male brought in by EMS for shortness of breath, symptoms x10 days, 79% on room air upon EMS arrival and placed on NC oxygen, currently on 3L at 96%. Initially bradycardic in 30s and given atropine with improvement. He has a pacemaker. Denies any chest pain. States noticed abdomen is hard and pulsating the past couple of days. Denies any abdominal pain. Started on antibiotics today for possible UTI. The history is provided by the patient and the EMS personnel. Shortness of Breath  Onset quality:  Gradual  Duration:  10 days  Timing:  Constant  Progression:  Worsening  Chronicity:  New  Context: not URI    Associated symptoms: no abdominal pain, no chest pain, no cough, no fever and no vomiting        Nursing Notes were reviewed    REVIEW OF SYSTEMS    (2-9 systems for level 4, 10 or more for level 5)     Review of Systems   Constitutional:  Negative for fever. Respiratory:  Positive for shortness of breath. Negative for cough. Cardiovascular:  Positive for leg swelling. Negative for chest pain. Gastrointestinal:  Negative for abdominal pain and vomiting. Genitourinary:  Negative for difficulty urinating and dysuria. Musculoskeletal:  Positive for arthralgias (bilateral shoulder pain).    Skin: Positive for wound (chronic leg wound sees wound care). Neurological:  Negative for dizziness and syncope. All other systems reviewed and are negative. Positives and Pertinent negatives as per HPI. PAST MEDICAL HISTORY     Past Medical History:   Diagnosis Date    Aortic valve stenosis     Arthritis     shoulder    Atrial fibrillation (HCC)     Hairy cell leukemia, in remission (HCC)     Hernia, abdominal     Hx of blood clots     Hypertension     Phlebitis     Pneumonia     Vertigo          SURGICAL HISTORY     Past Surgical History:   Procedure Laterality Date    ANOMALOUS PULMONARY VENOUS RETURN REPAIR, TOTAL      INTRACAPSULAR CATARACT EXTRACTION Left 03/27/2019    PHACO EMULSIFICATION OF CATARACT WITH INTRAOCULAR LENS IMPLANT EYE performed by Edna Slade MD at [facility] Right 04/03/2019    PHACO EMULSIFICATION OF CATARACT WITH INTRAOCULAR LENS IMPLANT EYE performed by Edna Slade MD at [facility]      2017    [address] [place] [address]       Current Discharge Medication List        CONTINUE these medications which have NOT CHANGED    Details   psyllium (METAMUCIL) 28 % packet Take 1 packet by mouth as needed      finasteride (PROSCAR) 5 MG tablet Take 5 mg by mouth daily      atorvastatin (LIPITOR) 10 MG tablet Take 20 mg by mouth daily      warfarin (COUMADIN) 5 MG tablet Take 5 mg by mouth daily Monday and Thursday - 5 mg, rest of the days 2.5 mg      Multiple Vitamins-Minerals (THERAPEUTIC MULTIVITAMIN-MINERALS) tablet Take 1 tablet by mouth daily      lisinopril (PRINIVIL;ZESTRIL) 40 MG tablet daily               ALLERGIES     Patient has no known allergies.     FAMILYHISTORY       Family History   Problem Relation Age of Onset    Rheumatologic Disease Mother     Arthritis Mother     Cancer Paternal Grandmother     Anesth Problems Neg Hx     Broken Bones Neg Hx     Clotting Disorder Neg Hx     Collagen Disease Neg Hx     Diabetes Neg Hx     Dislocations Neg Hx     Osteoporosis Neg Hx     Scoliosis Neg Hx     Severe Sprains Neg Hx           SOCIAL HISTORY       Social History     Socioeconomic History    Marital status:      Spouse name: None    Number of children: None    Years of education: None    Highest education level: None   Tobacco Use    Smoking status: Never    Smokeless tobacco: Never   Vaping Use    Vaping Use: Never used   Substance and Sexual Activity    Alcohol use: Never    Drug use: No    Sexual activity: Yes     Partners: Female       SCREENINGS    Jerson Coma Scale  Eye Opening: Spontaneous  Best Verbal Response: Oriented  Best Motor Response: Obeys commands  [place] Coma Scale Score: 15        PHYSICAL EXAM    (up to 7 for level 4, 8 or more for level 5)     ED Triage Vitals [11/18/22 1834]   BP Temp Temp Source Heart Rate Resp SpO2 Height Weight   (!) 155/113 97.5 °F (36.4 °C) Oral (!) 49 22 94 % 5' 9\" (1.753 m) 160 lb (72.6 kg)       Physical Exam  Vitals and nursing note reviewed. Constitutional:       Appearance: He is ill-appearing. Interventions: Nasal cannula in place. HENT:      Head: Normocephalic and atraumatic. Mouth/Throat:      Mouth: Mucous membranes are moist.      Pharynx: Oropharynx is clear. No posterior oropharyngeal erythema. Eyes:      Conjunctiva/sclera: Conjunctivae normal.      Pupils: Pupils are equal, round, and reactive to light. Cardiovascular:      Rate and Rhythm: Regular rhythm. Bradycardia present. Heart sounds: Normal heart sounds. Pulmonary:      Effort: Tachypnea present. Breath sounds: Decreased breath sounds present. No wheezing or rales. Abdominal:      General: Bowel sounds are normal. There is distension. Palpations: Abdomen is soft. Abdomen is not rigid. Tenderness: There is no abdominal tenderness. There is no guarding or rebound.    Musculoskeletal:         General: Normal range of motion. Cervical back: Normal range of motion and neck supple. Right lower leg: Edema present. Left lower leg: Edema present. Skin:     General: Skin is warm and dry. Neurological:      Mental Status: He is alert and oriented to person, place, and time. GCS: GCS eye subscore is 4. GCS verbal subscore is 5. GCS motor subscore is 6. Cranial Nerves: No cranial nerve deficit. Sensory: No sensory deficit. Motor: No abnormal muscle tone. Coordination: Coordination normal.   Psychiatric:         Speech: Speech normal.         Behavior: Behavior normal. Behavior is cooperative. Thought Content:  Thought content normal.       DIAGNOSTIC RESULTS   LABS:    Labs Reviewed   CBC WITH AUTO DIFFERENTIAL - Abnormal; Notable for the following components:       Result Value    RBC 3.75 (*)     Hemoglobin 12.1 (*)     Hematocrit 35.6 (*)     RDW 16.6 (*)     Platelets 36 (*)     Lymphocytes Absolute 0.9 (*)     All other components within normal limits   COMPREHENSIVE METABOLIC PANEL W/ REFLEX TO MG FOR LOW K - Abnormal; Notable for the following components:    Glucose 119 (*)     BUN 24 (*)     Albumin/Globulin Ratio 1.0 (*)     All other components within normal limits   TROPONIN - Abnormal; Notable for the following components:    Troponin 0.04 (*)     All other components within normal limits   BRAIN NATRIURETIC PEPTIDE - Abnormal; Notable for the following components:    Pro-BNP 6,860 (*)     All other components within normal limits   PROTIME-INR - Abnormal; Notable for the following components:    Protime 37.1 (*)     INR 3.75 (*)     All other components within normal limits   URINALYSIS WITH REFLEX TO CULTURE - Abnormal; Notable for the following components:    Clarity, UA SL CLOUDY (*)     Blood, Urine LARGE (*)     Protein,  (*)     All other components within normal limits   BLOOD GAS, VENOUS - Abnormal; Notable for the following components:    pH, Francisco 7.307 (*) pCO2, Francisco 55.2 (*)     pO2, Francisco 42.3 (*)     Carboxyhemoglobin 2.3 (*)     All other components within normal limits   BLOOD GAS, VENOUS - Abnormal; Notable for the following components:    pH, Francisco 7.337 (*)     pCO2, Francisco 54.5 (*)     pO2, Francisco 161.2 (*)     Carboxyhemoglobin 2.3 (*)     All other components within normal limits   MICROSCOPIC URINALYSIS - Abnormal; Notable for the following components:    Mucus, UA 1+ (*)     WBC, UA 10-20 (*)     RBC, UA >100 (*)     Bacteria, UA 1+ (*)     All other components within normal limits   COVID-19 & INFLUENZA COMBO   CULTURE, BLOOD 1   CULTURE, BLOOD 2   CULTURE, URINE   LACTATE, SEPSIS   COMPREHENSIVE METABOLIC PANEL W/ REFLEX TO MG FOR LOW K   CBC WITH AUTO DIFFERENTIAL   PROTIME-INR       Results for orders placed or performed during the hospital encounter of 11/18/22   COVID-19 & Influenza Combo    Specimen: Nasopharyngeal Swab   Result Value Ref Range    SARS-CoV-2 RNA, RT PCR NOT DETECTED NOT DETECTED    INFLUENZA A NOT DETECTED NOT DETECTED    INFLUENZA B NOT DETECTED NOT DETECTED   CBC with Auto Differential   Result Value Ref Range    WBC 5.9 4.0 - 11.0 K/uL    RBC 3.75 (L) 4.20 - 5.90 M/uL    Hemoglobin 12.1 (L) 13.5 - 17.5 g/dL    Hematocrit 35.6 (L) 40.5 - 52.5 %    MCV 95.0 80.0 - 100.0 fL    MCH 32.3 26.0 - 34.0 pg    MCHC 34.0 31.0 - 36.0 g/dL    RDW 16.6 (H) 12.4 - 15.4 %    Platelets 36 (L) 697 - 450 K/uL    MPV 9.8 5.0 - 10.5 fL    PLATELET SLIDE REVIEW Adequate     SLIDE REVIEW see below     Neutrophils % 76.4 %    Lymphocytes % 14.7 %    Monocytes % 8.6 %    Eosinophils % 0.0 %    Basophils % 0.3 %    Neutrophils Absolute 4.5 1.7 - 7.7 K/uL    Lymphocytes Absolute 0.9 (L) 1.0 - 5.1 K/uL    Monocytes Absolute 0.5 0.0 - 1.3 K/uL    Eosinophils Absolute 0.0 0.0 - 0.6 K/uL    Basophils Absolute 0.0 0.0 - 0.2 K/uL   Comprehensive Metabolic Panel w/ Reflex to MG   Result Value Ref Range    Sodium 137 136 - 145 mmol/L    Potassium reflex Magnesium 4.3 3.5 - 5.1 mmol/L    Chloride 100 99 - 110 mmol/L    CO2 31 21 - 32 mmol/L    Anion Gap 6 3 - 16    Glucose 119 (H) 70 - 99 mg/dL    BUN 24 (H) 7 - 20 mg/dL    Creatinine 0.8 0.8 - 1.3 mg/dL    Est, Glom Filt Rate >60 >60    Calcium 8.5 8.3 - 10.6 mg/dL    Total Protein 6.9 6.4 - 8.2 g/dL    Albumin 3.5 3.4 - 5.0 g/dL    Albumin/Globulin Ratio 1.0 (L) 1.1 - 2.2    Total Bilirubin 0.7 0.0 - 1.0 mg/dL    Alkaline Phosphatase 105 40 - 129 U/L    ALT 19 10 - 40 U/L    AST 22 15 - 37 U/L   Troponin   Result Value Ref Range    Troponin 0.04 (H) <0.01 ng/mL   Brain Natriuretic Peptide   Result Value Ref Range    Pro-BNP 6,860 (H) 0 - 449 pg/mL   Protime-INR   Result Value Ref Range    Protime 37.1 (H) 11.7 - 14.5 sec    INR 3.75 (H) 0.87 - 1.14   Urinalysis with Reflex to Culture    Specimen: Urine   Result Value Ref Range    Color, UA Yellow Straw/Yellow    Clarity, UA SL CLOUDY (A) Clear    Glucose, Ur Negative Negative mg/dL    Bilirubin Urine Negative Negative    Ketones, Urine Negative Negative mg/dL    Specific Gravity, UA >=1.030 1.005 - 1.030    Blood, Urine LARGE (A) Negative    pH, UA 6.0 5.0 - 8.0    Protein,  (A) Negative mg/dL    Urobilinogen, Urine 0.2 <2.0 E.U./dL    Nitrite, Urine Negative Negative    Leukocyte Esterase, Urine Negative Negative    Microscopic Examination YES     Urine Type NotGiven     Urine Reflex to Culture Yes    Lactate, Sepsis   Result Value Ref Range    Lactic Acid, Sepsis 1.3 0.4 - 1.9 mmol/L   Blood gas, venous   Result Value Ref Range    pH, Francisco 7.307 (L) 7.350 - 7.450    pCO2, Francisco 55.2 (H) 40.0 - 50.0 mmHg    pO2, Francisco 42.3 (H) 25.0 - 40.0 mmHg    HCO3, Venous 27.0 23.0 - 29.0 mmol/L    Base Excess, Francisco -0.2 -3.0 - 3.0 mmol/L    O2 Sat, Francisco 73 Not Established %    Carboxyhemoglobin 2.3 (H) 0.0 - 1.5 %    MetHgb, Francisco 0.3 <1.5 %    TC02 (Calc), Francisco 29 Not Established mmol/L    O2 Content, Francisco 13 Not Established VOL %    O2 Therapy Unknown    Blood gas, venous   Result Value Ref Range interpreted by the Emergency Department Physician in the absence of a cardiologist.  Please see their note for interpretation of EKG. RADIOLOGY:   Non-plain film images such as CT, Ultrasound and MRI are read by the radiologist. Plain radiographic images are visualized andpreliminarily interpreted by the  ED Provider with the below findings:          Interpretation perthe Radiologist below, if available at the time of this note:    CTA CHEST ABDOMEN PELVIS W CONTRAST   Final Result   No evidence of aortic dissection or aneurysm. There is diffuse calcific atherosclerotic disease particularly, aorto iliac   atherosclerotic disease. 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 There is a large low-attenuation right pleural effusion causing near complete   compressive atelectasis of the right lung. Thoracentesis may be helpful. Airspace disease with volume loss in the lingula and left lower lobe may   represent atelectasis and or pneumonia. Status post TAVR. Mild cardiomegaly. XR CHEST PORTABLE   Final Result   Moderate size right pleural effusion. Shallow lung volumes. XR CHEST PORTABLE    Result Date: 11/18/2022  EXAMINATION: ONE XRAY VIEW OF THE CHEST 11/18/2022 6:48 pm COMPARISON: None. HISTORY: ORDERING SYSTEM PROVIDED HISTORY: shortness of breath TECHNOLOGIST PROVIDED HISTORY: Reason for exam:->shortness of breath Reason for Exam: shortness of breath FINDINGS: The lung volumes are shallow. There is a moderate volume of pleural effusion in the right sub pulmonic region, pulmonary fissures, and right apical region. The right pleural effusion obscures the right heart border therefore the cardiac size cannot be accurately assessed. There is partial atelectasis of both lungs. There is a pacemaker with 2 leads. There is a TAVR. There is no evidence of a pneumothorax. There is no pulmonary consolidation. The left retrocardiac region is under penetrated. There is a benign island of sclerosis in the right scapula. Moderate size right pleural effusion. Shallow lung volumes. CTA CHEST ABDOMEN PELVIS W CONTRAST    Result Date: 11/18/2022  EXAMINATION: CTA OF THE CHEST, ABDOMEN AND PELVIS WITH CONTRAST 11/18/2022 7:57 pm: TECHNIQUE: CTA of the chest, abdomen and pelvis was performed after the administration of intravenous contrast.  Multiplanar reformatted images are provided for review. MIP images are provided for review. Automated exposure control, iterative reconstruction, and/or weight based adjustment of the mA/kV was utilized to reduce the radiation dose to as low as reasonably achievable. COMPARISON: CT chest 05/20/2022 HISTORY: ORDERING SYSTEM PROVIDED HISTORY: r/o dissection TECHNOLOGIST PROVIDED HISTORY: Reason for exam:->r/o dissection Decision Support Exception - unselect if not a suspected or confirmed emergency medical condition->Emergency Medical Condition (MA) Reason for Exam: r/o dissection FINDINGS: CTA CHEST: Thoracic aorta: No evidence of thoracic aortic aneurysm or dissection. No acute abnormality of the aorta. Status post TAVR. Mediastinum: No mediastinal adenopathy. The heart is mildly enlarged. Status post TAVR. There is artifact from cardiac pacing leads. No pericardial effusion. There is a large hiatal hernia. The distal esophagus is dilated and fluid-filled suggesting reflux. Lungs/Pleura: There is a large low-attenuation right pleural effusion causing compressive atelectasis of the right lung. There is trace left pleural fluid. There is airspace disease with volume loss in the lingula and left lower lobe. No pneumothorax. Soft Tissues/Bones: Multilevel degenerative changes in the spine with no evidence of an acute injury. CTA ABDOMEN: Abdominal aorta/Branches: The abdominal aorta is normal in caliber with no evidence of aneurysm or dissection. There is calcific aorto iliac atherosclerotic disease.   Abdominal aortic branch vessels are patent. There are 2 left renal arteries. Organs: The liver and right adrenal gland are grossly. The pancreas is atrophic. Small unchanged left adrenal gland nodule, likely an adenoma. The spleen is apparently surgically absent. GI/Bowel: There is nonobstructive gaseous distention of bowel. Peritoneum/Retroperitoneum: There is no adenopathy, free air or free fluid. Bones/Soft Tissues: Multilevel degenerative changes. No acute bone finding. CTA PELVIS: Aorta/Iliacs: The iliac and femoral arteries are mildly calcified, diffusely ectatic and tortuous. 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 Other: Urinary bladder was not well distended but appeared grossly. Few prostate gland calcifications. Bones/Soft Tissues: Bones are diffusely demineralized. There is moderate bilateral hip joint space narrowing. No acute bone finding. No evidence of aortic dissection or aneurysm. There is diffuse calcific atherosclerotic disease particularly, aorto iliac atherosclerotic disease. 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 There is a large low-attenuation right pleural effusion causing near complete compressive atelectasis of the right lung. Thoracentesis may be helpful. Airspace disease with volume loss in the lingula and left lower lobe may represent atelectasis and or pneumonia. Status post TAVR. Mild cardiomegaly. PROCEDURES   Unless otherwise noted below, none     Procedures    CRITICAL CARE TIME   Critical care provided for this patient of which 15 min were spend on critical care and decision making. 0 min spent on procedures. There was imminent failure of an organ system which required critical intervention to prevent clinically significant progression of life threatening deterioration of the patient's condition to the point of disability or death.       CONSULTS:  IP CONSULT TO CARDIOLOGY  IP CONSULT TO PULMONOLOGY      EMERGENCY DEPARTMENT COURSE and DIFFERENTIAL DIAGNOSIS/MDM:   Vitals:    Vitals:    11/18/22 2119 11/18/22 2217 11/18/22 2315 11/19/22 0119   BP: (!) 141/97 (!) 147/64 (!) 154/85    Pulse: (!) 103 68 60    Resp: 22 16 24    Temp:   97.1 °F (36.2 °C)    TempSrc:       SpO2: 94% 97% 95%    Weight:    162 lb 1.6 oz (73.5 kg)   Height:           Patient was given thefollowing medications:  Medications   atropine 1 MG/ML injection (  Not Given 11/18/22 1914)   warfarin (COUMADIN) tablet 5 mg (has no administration in time range)   atorvastatin (LIPITOR) tablet 20 mg (has no administration in time range)   finasteride (PROSCAR) tablet 5 mg (has no administration in time range)   sodium chloride flush 0.9 % injection 5-40 mL ( IntraVENous Canceled Entry 11/18/22 2329)   sodium chloride flush 0.9 % injection 5-40 mL (has no administration in time range)   0.9 % sodium chloride infusion (has no administration in time range)   polyethylene glycol (GLYCOLAX) packet 17 g (has no administration in time range)   acetaminophen (TYLENOL) tablet 650 mg (has no administration in time range)     Or   acetaminophen (TYLENOL) suppository 650 mg (has no administration in time range)   furosemide (LASIX) injection 40 mg (has no administration in time range)   atropine 1 MG/10ML injection (0.5 mg  Given 11/18/22 1914)   iopamidol (ISOVUE-370) 76 % injection 85 mL (85 mLs IntraVENous Given 11/18/22 1959)   furosemide (LASIX) injection 40 mg (40 mg IntraVENous Given 11/18/22 2114)       Is this patient to be included in the SEP-1 Core Measure due to severe sepsis or septic shock? No   Exclusion criteria - the patient is NOT to be included for SEP-1 Core Measure due to: Infection is not suspected       ED course  Patient brought in by EMS for shortness of breath. Hypoxic 79% on room air was placed on nasal cannula oxygen and at 96% on 3 L, this is new requirement. Diminished breath sounds.   He is tachypneic and bradycardic into the 30s was given 0.5 mg atropine shortly after arrival with improvement HR up to 100 and repeat EKG showing paced rhythm. No acute ischemic change. Cardiology was consulted no further orders, EP to see him tomorrow. CTA chest abdomen pelvis is negative for dissection or aortic aneurysm. 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 large right pleural effusion causing near complete compressive atelectasis of the right lung. Atelectasis vs PNA LLL. He is on antibiotics were started today. Patient is stable. He will be admitted. FINAL IMPRESSION      1. Acute respiratory failure with hypoxia ([address]    2. Pleural effusion on right    3. Elevated brain natriuretic peptide (BNP) level    4. Bradycardia          DISPOSITION/PLAN   DISPOSITION Admitted    PATIENT REFERREDTO:  No follow-up provider specified.     DISCHARGE MEDICATIONS:  Current Discharge Medication List          DISCONTINUED MEDICATIONS:  Current Discharge Medication List                 (Please note that portions ofthis note were completed with a voice recognition program.  Efforts were made to edit the dictations but occasionally words are mis-transcribed.)    Jazz Bentley PA-C (electronically signed)            Raine Brian PA-C  11/19/22 0147

## 2022-11-19 NOTE — ED NOTES
1925 call placed to cardiology on hold 12 min         Ramon Belt  11/18/22 455 Arabella Mcclain  11/18/22 1943    1939 call placed to cardiology     1944 consult completed with call back from Dr. Susanne Swenson  11/18/22 1945

## 2022-11-19 NOTE — CONSULTS
Palliative Care Initial Note  Palliative Care Admit date:11/19/2022  ACP/Palliative Care this date.     Tiara Vitale RN Palliative Care

## 2022-11-19 NOTE — PROGRESS NOTES
Patient found with oxygen off and gown off and patient states he was ready to go home. Redirected after seceral attempts and patient continues to take oxygen off.   (92-95% on room air)

## 2022-11-19 NOTE — CARE COORDINATION
Case Management Assessment  Initial Evaluation      Patient Name: Jesus Cortez  YOB: 1931  Diagnosis: Bradycardia [R00.1]  Pleural effusion on right [J90]  Elevated brain natriuretic peptide (BNP) level [R79.89]  Acute respiratory failure with hypoxia ([address] [J96.01]  Date / Time: 11/18/2022  6:30 PM    Admission status/Date:inpt  Chart Reviewed: Yes      Patient Interviewed: No   Family Interviewed:  Yes - spouse      Hospitalization in the last 30 days:  No      Health Care Decision Maker :   Primary Decision Maker: Sarah Ndiaye - Spouse - [phone]    (CM - must 1st enter selection under Navigator - emergency contact- Health Care Decision Maker Relationship and pick relationship)   Who do you trust or have selected to make healthcare decisions for you      Met with: pt's spouse at [facility]  Interview conducted  (bedside/phone):    Current PCP:   Oksana Murphy MD      Financial  Commercial  Precert required for SNF : Y, N          3 night stay required - Y, N    ADLS  Support Systems/Care Needs: Spouse/Significant Other  Transportation: family    Meal Preparation: family    Housing  Living Arrangements: home with spouse  Steps: none  Intent for return to present living arrangements: to be decided  Identified Issues: meeting with [facility]  Active with [address] [employer] [address] : Yes - [address] [facility],PT/OT Agency:(Services)  Type of Home Care Services: [facility]  Passport/Waiver : No  :                      Phone Number:    Passport/Waiver Services: no          Durable Medical Equiptment   DME Provider: keren  Equipment:   Walker__x_Cane___RTS___ BSC___Shower Chair___Hospital Bed___W/C____Other___electric w/c_____  02 at ____Liter(s)---wears(frequency)_______ HHN ___ CPAP___ BiPap___   N/A____      Home O2 Use :  No    If No for home O2---if presently on O2 during hospitalization:  Yes  if yes CM to follow for potential DC O2 need  Informed of need for care provider to bring portable home O2 tank on day of discharge for nursing to connect prior to leaving:   Not Indicated  Verbalized agreement/Understanding:   Not Indicated    Community Service Affiliation  Dialysis:  No    Agency:  Location:  Dialysis Schedule:  Phone:   Fax: Other Community Services: (ex:PT/OT,Mental Health,Wound Clinic, Cardio/Pul [address])    DISCHARGE PLAN: Explained Case Management role/services. Reviewed chart. Met with pt's spouse at bedside. Pt asleep at this time. Pt from home with spouse and spouse requesting meeting with [facility] today. See PC notes. Follow.

## 2022-11-19 NOTE — H&P
Hospital Medicine History & Physical      PCP: Siddhartha Lugo MD    Date of Admission: 11/18/2022    Date of Service: Pt seen/examined on 11/18/2022    Chief Complaint: Shortness of breath    History Of Present Illness:    80 y.o. male who presented to the hospital with a chief complaint with shortness of breath and hypoxia. The patient was brought in by squad. The patient apparently has been short of breath for the past week. His symptoms progressively got worse and today he felt he could not catch his breath. He was heavy breathing per his wife when EMS got there. He was hypoxic on room air when he was brought to the emergency department. The patient was very somnolent when I saw him and cannot provide history, the wife who was at his bedside was a very poor historian and could not give much history. He was recently treated for UTI by his PCP otherwise she says he has been fine except for this shortness of breath. In emergency department he was hypoxic on presentation and was noted to be bradycardic on the monitor into 30s. He was given a dose of atropine with improvement of his heart rate. Of note the patient has a pacemaker in place which has been interrogated and deemed to be working properly. In emergency department work-up which included a CT of his chest abdomen pelvis was positive for significant right pleural effusion causing almost collapse of the right lung. There was also concern for pneumonitis.   He will be admitted for further medical evaluation and treatment    Past Medical History:          Diagnosis Date    Aortic valve stenosis     Arthritis     shoulder    Atrial fibrillation (HCC)     Hairy cell leukemia, in remission (HCC)     Hernia, abdominal     Hx of blood clots     Hypertension     Phlebitis     Pneumonia     Vertigo        Past Surgical History:          Procedure Laterality Date    ANOMALOUS PULMONARY VENOUS RETURN REPAIR, TOTAL      INTRACAPSULAR CATARACT EXTRACTION Left 03/27/2019    PHACO EMULSIFICATION OF CATARACT WITH INTRAOCULAR LENS IMPLANT EYE performed by Lowell Harley MD at [facility] [facility] Right 04/03/2019    PHACO EMULSIFICATION OF CATARACT WITH INTRAOCULAR LENS IMPLANT EYE performed by Lowell Harley MD at [facility]      2017    SHOULDER SURGERY      SPLENECTOMY      TONSILLECTOMY         Medications Prior to Admission:      Prior to Admission medications    Medication Sig Start Date End Date Taking? Authorizing Provider   psyllium (METAMUCIL) 28 % packet Take 1 packet by mouth as needed    Historical Provider, MD   finasteride (PROSCAR) 5 MG tablet Take 5 mg by mouth daily    Historical Provider, MD   atorvastatin (LIPITOR) 10 MG tablet Take 20 mg by mouth daily    Historical Provider, MD   warfarin (COUMADIN) 5 MG tablet Take 5 mg by mouth daily Monday and Thursday - 5 mg, rest of the days 2.5 mg    Historical Provider, MD   Multiple Vitamins-Minerals (THERAPEUTIC MULTIVITAMIN-MINERALS) tablet Take 1 tablet by mouth daily    Historical Provider, MD   lisinopril (PRINIVIL;ZESTRIL) 40 MG tablet daily 9/9/14   Historical Provider, MD       Allergies:  Patient has no known allergies. Social History:      TOBACCO:   reports that he has never smoked. He has never used smokeless tobacco.  ETOH:   reports no history of alcohol use.       Family History:          Problem Relation Age of Onset    Rheumatologic Disease Mother     Arthritis Mother     Cancer Paternal Grandmother     Anesth Problems Neg Hx     Broken Bones Neg Hx     Clotting Disorder Neg Hx     Collagen Disease Neg Hx     Diabetes Neg Hx     Dislocations Neg Hx     Osteoporosis Neg Hx     Scoliosis Neg Hx     Severe Sprains Neg Hx        REVIEW OF SYSTEMS:   Cannot obtain given patient's mental status    PHYSICAL EXAM:  BP (!) 154/85   Pulse 60   Temp 97.1 °F (36.2 °C)   Resp 24   Ht 5' 9\" (1.753 m)   Wt 160 lb (72.6 kg)   SpO2 95%   BMI 23.63 kg/m²   General appearance: Ill-appearing gentleman, very difficult to arouse  HEENT:  Normal cephalic, atraumatic without obvious deformity. Pupils equal, round, and reactive to light. Extra ocular muscles intact. Conjunctivae/corneas clear. Neck: Supple, with full range of motion. No jugular venous distention. Trachea midline. Respiratory: Decreased breath sounds, diminished on the right, tachypneic. Cardiovascular: Irregular rate and rhythm  Abdomen: Soft, non-tender, non-distended with normal bowel sounds. Musculoskeletal:  No clubbing, cyanosis or edema bilaterally. Full range of motion without deformity. Skin: Skin color, texture, turgor normal.  No rashes or lesions. Neurologic: Could not assess as the patient is difficult to arouse and only responding to physical stimuli. Psychiatric: Could not assess  Capillary Refill: Brisk,< 3 seconds   Peripheral Pulses: +2 palpable, equal bilaterally       Labs:   Recent Labs     11/18/22 1856   WBC 5.9   HGB 12.1*   HCT 35.6*   PLT 36*     Recent Labs     11/18/22 1856      K 4.3      CO2 31   BUN 24*   CREATININE 0.8   CALCIUM 8.5     Recent Labs     11/18/22 1856   AST 22   ALT 19   BILITOT 0.7   ALKPHOS 105     Recent Labs     11/18/22 1856   INR 3.75*     Recent Labs     11/18/22 1856   TROPONINI 0.04*       Urinalysis:   Lab Results   Component Value Date/Time    NITRU Negative 11/18/2022 09:15 PM    WBCUA 10-20 11/18/2022 09:15 PM    BACTERIA 1+ 11/18/2022 09:15 PM    RBCUA >100 11/18/2022 09:15 PM    BLOODU LARGE 11/18/2022 09:15 PM    SPECGRAV >=1.030 11/18/2022 09:15 PM    GLUCOSEU Negative 11/18/2022 09:15 PM       Radiology:   CTA CHEST ABDOMEN PELVIS W CONTRAST   Final Result   No evidence of aortic dissection or aneurysm. There is diffuse calcific atherosclerotic disease particularly, aorto iliac   atherosclerotic disease. 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       There is a large low-attenuation right pleural effusion causing near complete   compressive atelectasis of the right lung. Thoracentesis may be helpful. Airspace disease with volume loss in the lingula and left lower lobe may   represent atelectasis and or pneumonia. Status post TAVR. Mild cardiomegaly. XR CHEST PORTABLE   Final Result   Moderate size right pleural effusion. Shallow lung volumes. ASSESSMENT:  Acute respiratory failure hypoxia  Right pleural effusion with compressive atelectasis  Acute metabolic encephalopathy likely secondary to hypoxia  Volume overload on exam, elevated BNP  Community-acquired pneumonia  Bradycardia  Sick sinus syndrome, status post permanent pacemaker  Aortic stenosis status post TAVR  Permanent atrial fibrillation on Coumadin therapy  BPH    PLAN:  The patient will be admitted for medical treatment. We will start patient on twice daily Lasix therapy, will consult pulmonary for possible thoracentesis over this weekend. We will need to reverse INR prior to any procedures. Hold Coumadin therapy.  -Continue supplemental oxygen therapy  -Empiric antibiotic therapy with Levaquin  -Pulmonary and cardiology consultation  -We will keep n.p.o. till patient is more alert, check VBG  -Vitamin K x1      DVT Prophylaxis: Hold Coumadin therapy  Diet: Diet NPO  Code Status: DNR-CCA    Dispo -admit to [facility]  to patient's spouse who was at bedside, she confirms the patient is a DNR CC and does not want aggressive measures including intubation or chest compressions. Thank you for the opportunity to be involved in this patient's care.       (Please note that portions of this note were completed with a voice recognition program. Efforts were made to edit the dictations but occasionally words are mis-transcribed.)

## 2022-11-19 NOTE — PROGRESS NOTES
Progress Note    Admit Date:  11/18/2022    Acute respiratory failure with hypoxia  Right pleural effusion with compressive atelectasis  Acute metabolic encephalopathy  Bradycardia    Subjective:  Mr. Lenora Lipscomb is alert and oriented x3 today. States he feels improved today. Fatigued and weak but does state his breathing is subjectively better. Objective:   Patient Vitals for the past 4 hrs:   BP Temp Temp src Pulse Resp SpO2   11/19/22 0739 (!) 144/73 97.3 °F (36.3 °C) Oral 56 21 98 %          Intake/Output Summary (Last 24 hours) at 11/19/2022 0818  Last data filed at 11/19/2022 0529  Gross per 24 hour   Intake --   Output 2175 ml   Net -2175 ml       Physical Exam:    Gen: No distress. Fatigued. Chronically ill appearing. Eyes: PERRL. No sclera icterus. No conjunctival injection. ENT: No discharge. Pharynx clear. Neck: No JVD. Trachea midline. Resp: +accessory muscle use. +Diminished breath sounds over the right lung fields. +LLL crackles. No wheezes. No rhonchi. CV: Irregular rate. Irregular rhythm. No murmur. No rub. No edema. Capillary Refill: Brisk,< 3 seconds   Peripheral Pulses: +2 palpable, equal bilaterally   GI: Non-tender. Non-distended. Normal bowel sounds. Skin: Warm and dry. No nodule on exposed extremities. No rash on exposed extremities. M/S: No cyanosis. No joint deformity. No clubbing. Neuro: Awake. Grossly nonfocal    Psych: Oriented x 3. No anxiety or agitation. Sobeida Morales MD have reviewed the chart on [facility] Valladares and personally interviewed and examined patient, reviewed the data (labs and imaging) and after discussion with my PA formulated the plan. Agree with note with the following edits. Physical exam:    BP (!) 144/73   Pulse 56   Temp 97.3 °F (36.3 °C) (Oral)   Resp 21   Ht 5' 9\" (1.753 m)   Wt 162 lb 1.6 oz (73.5 kg)   SpO2 98%   BMI 23.94 kg/m²     Gen: No distress. Alert. Eyes: PERRL. No sclera icterus.  No conjunctival injection. ENT: No discharge. Pharynx clear. Neck: Trachea midline. Normal thyroid. Resp: + accessory muscle use. No crackles. No wheezes. No rhonchi. No dullness on percussion. CV: Regular rate. Regular rhythm. No murmur or rub. No edema. GI: Non-tender. Non-distended. No masses. No organomegaly. Normal bowel sounds. No hernia. Skin: Warm and dry. No nodule on exposed extremities. No rash on exposed extremities. Lymph: No cervical LAD. No supraclavicular LAD. M/S: No cyanosis. No joint deformity. No clubbing. Neuro: Awake. Moves all 4 extremities, non focal  Psych: Oriented x 3. No anxiety or agitation. AMADO Sheets.       Scheduled Meds:   atorvastatin  20 mg Oral Daily    finasteride  5 mg Oral Daily    sodium chloride flush  5-40 mL IntraVENous 2 times per day    furosemide  40 mg IntraVENous BID       Continuous Infusions:   sodium chloride         PRN Meds:  sodium chloride flush, sodium chloride, polyethylene glycol, acetaminophen **OR** acetaminophen      Data:  CBC:   Recent Labs     11/18/22 1856 11/19/22 0424   WBC 5.9 6.5   HGB 12.1* 10.6*   HCT 35.6* 31.2*   MCV 95.0 94.8   PLT 36* 60*     BMP:   Recent Labs     11/18/22 1856 11/19/22 0424    133*   K 4.3 4.2    96*   CO2 31 31   BUN 24* 23*   CREATININE 0.8 0.9     LIVER PROFILE:   Recent Labs     11/18/22 1856 11/19/22 0424   AST 22 20   ALT 19 16   BILITOT 0.7 0.5   ALKPHOS 105 85     PT/INR:   Recent Labs     11/18/22 1856 11/19/22 0424   PROTIME 37.1* 42.7*   INR 3.75* 4.47*       CULTURES  Covid and flu not detected  Blood cx x2 in process  Ucx in process     RADIOLOGY  CTA CHEST ABDOMEN PELVIS W CONTRAST   Final Result   No evidence of aortic dissection or aneurysm. There is diffuse calcific atherosclerotic disease particularly, aorto iliac   atherosclerotic disease. 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       There is a large low-attenuation right pleural effusion causing near complete   compressive atelectasis of the right lung. Thoracentesis may be helpful. Airspace disease with volume loss in the lingula and left lower lobe may   represent atelectasis and or pneumonia. Status post TAVR. Mild cardiomegaly. XR CHEST PORTABLE   Final Result   Moderate size right pleural effusion. Shallow lung volumes. ECHO 2/8/22  Summary: Ao Valve prosthesis mfg is [employer]li. Ao Valve prosthesis measures 29 mm. There is moderate concentric left ventricular hypertrophy. There is marked mitral annular calcification present. A bioprosthetic prosthesis is present in the aortic valve position. There is mild mitral regurgitation. There is a pacemaker lead in the right ventricle. Overall left ventricular ejection fraction is estimated to be 55-60%. The left ventricular wall motion is normal.   Right ventricular systolic pressure is mildly elevated at 35-45 mmHg. Mild - moderate tricuspid regurgitation is present. The gradient is normal for this prosthetic aortic valve. Mild perivalvular aortic regurgitation. The left atrium is mildly dilated. The right atrium is mildly dilated.      Assessment/Plan:  #Acute respiratory failure hypoxia  -no home O2   -requiring 3-5 L NC; seen on 1.5 today  -wean as tolerated    #Right pleural effusion with compressive atelectasis  -likely 2/2 above  -patient is on coumadin therapy for below; holding  -INR supratherapeutic, will need reversal prior to procedure  --INR 3.75 -> 4.47- s/p 1 dose Vit K  -Pulmonology consulted    #Acute metabolic encephalopathy   -likely secondary to hypoxia  -mentation much better today; conversational, A&Ox3     #Elevated BNP  -no hx of CHF; clinically fluid overloaded   -lasix 40 BID  -cardiology consulted  -previous ECHO above    #Bradycardia  -Improved with dose of atropine in the ED  -cardiology consulted    #Sick sinus syndrome, status post permanent pacemaker  -pacemaker interrogated and deemed to be working properly    #Aortic stenosis status post TAVR  #Permanent atrial fibrillation   -on Coumadin therapy; holding with supratherapeutic INR    #Venous insufficiency   -f/w wound care clinic  -wound care consult for RLE wounds    #TCP - chronic  #Hx of hairy cell leukemia; in remission    #BPH  -continue proscar    DVT Prophylaxis: Holding coumadin with supratherapeutic INR  Diet: Diet NPO  Code Status: DNR-CCA    Cornelius Richardson PA-C  11/19/2022 9:25 AM        Agree wit haily Sheets M.D.

## 2022-11-19 NOTE — PROGRESS NOTES
Patient assessed and AM medications given. PA at bedside for assessment. Orders for order SLP if any trouble swallowing. Patient became choked on water during assessment. SLP consult ordered. Diet changed to minced and moist.  Patient A/O x4 today. Updated family. Patient denies any further needs at this time. Call light within reach.

## 2022-11-19 NOTE — PROGRESS NOTES
History:   Diagnosis Date    Aortic valve stenosis     Arthritis     shoulder    Atrial fibrillation (HCC)     Hairy cell leukemia, in remission (HCC)     Hernia, abdominal     Hx of blood clots     Hypertension     Phlebitis     Pneumonia     Vertigo      Past Surgical History:   Procedure Laterality Date    ANOMALOUS PULMONARY VENOUS RETURN REPAIR, TOTAL      INTRACAPSULAR CATARACT EXTRACTION Left 03/27/2019    PHACO EMULSIFICATION OF CATARACT WITH INTRAOCULAR LENS IMPLANT EYE performed by Ann-Marie Solo MD at [address] Right 04/03/2019    PHACO EMULSIFICATION OF CATARACT WITH INTRAOCULAR LENS IMPLANT EYE performed by Ann-Marie Solo MD at [address]      2017    SHOULDER SURGERY      SPLENECTOMY      TONSILLECTOMY       No Known Allergies    DATE ONSET: 11/18/22    Date of Evaluation: 11/19/2022   Evaluating Therapist: YULIANA Edwards    Chart Reviewed: : [x] Yes [] No    Current Diet: ADULT DIET; Dysphagia - Minced and Moist    Recent Chest Radiography: [] Chest XR   [x] CT of Chest  Date: 11/18/22  Impressions  Impression   No evidence of aortic dissection or aneurysm. There is diffuse calcific atherosclerotic disease particularly, aorto iliac   atherosclerotic disease. 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 There is a large low-attenuation right pleural effusion causing near complete   compressive atelectasis of the right lung. Thoracentesis may be helpful. Airspace disease with volume loss in the lingula and left lower lobe may   represent atelectasis and or pneumonia. Status post TAVR. Mild cardiomegaly.        Pain: reports burning pain at site of meds; RN notified    Reason for Referral  Jesus Cortez was referred for a bedside swallow evaluation to assess the efficiency of their swallow function, identify signs and symptoms of aspiration and make recommendations regarding safe dietary consistencies, effective compensatory strategies, and safe eating environment. Assessment    Medical record review/interview: Per MD H&P: \"\"80 y.o. male who presented to the hospital with a chief complaint with shortness of breath and hypoxia. The patient was brought in by squad. The patient apparently has been short of breath for the past week. His symptoms progressively got worse and today he felt he could not catch his breath. He was heavy breathing per his wife when EMS got there. He was hypoxic on room air when he was brought to the emergency department. The patient was very somnolent when I saw him and cannot provide history, the wife who was at his bedside was a very poor historian and could not give much history. He was recently treated for UTI by his PCP otherwise she says he has been fine except for this shortness of breath. In emergency department he was hypoxic on presentation and was noted to be bradycardic on the monitor into 30s. He was given a dose of atropine with improvement of his heart rate. Of note the patient has a pacemaker in place which has been interrogated and deemed to be working properly. In emergency department work-up which included a CT of his chest abdomen pelvis was positive for significant right pleural effusion causing almost collapse of the right lung. There was also concern for pneumonitis.   He will be admitted for further medical evaluation and treatment\"\"    Predisposing dysphagia risk factors: Age  Clinical signs of possible chronic dysphagia: recurrent PNA  Precipitating dysphagia risk factors: increased O2 demands and AMS    Patient Complaints:  Odynophagia: [] Yes [x] No  Globus Sensation: [x] Yes [] No  SOB with PO intake: [] Yes [x] No  Increased WOB with PO intake: [] Yes [x] No  Reflux Sx's: [] Yes [x] No  Weight loss: [] Yes [x] No  Coughing/Choking with PO intake: [x] Yes [] No  Reduced Appetite: [] Yes [x] No    Vitals/labs:   Temp: N/A  SpO2: 98%  RR: 21  BP: 144/73  HR: 56  O2 device: NC    CBC:   Recent Labs     11/19/22 0424   WBC 6.5   HGB 10.6*   PLT 60*      BMP:  Recent Labs     11/19/22 0424   *   K 4.2   CL 96*   CO2 31   BUN 23*   CREATININE 0.9   GLUCOSE 96          Cranial nerve exam:   CN V (trigeminal): ophthalmic, maxillary, and mandibular facial sensation- WFL  CN VII (facial): WFL  CN IX/X (glossopharyngeal/vagus): MPT: WFL; pitch range: WFL; vocal quality: WFL; cough: Strong-perceptually  CN XII (hypoglossal): WFL    Laryngeal function exam:   Secretions/mucous present in oral cavity, RN reports pt coughing up mucous  Vocal quality: See CN exam above  MPT: See CN exam above  Pitch range: See CN exam above  Cough: See CN exam above    Oral Care Status:    [] Oral Care [facility]  [x] Poor oral care status  [] Edentulous  [] Upper Dentures  [] Lower Dentures  [x] Missing/Broken Teeth  [] Evidence of dental cavities/carries    PO trials:   IDDSI 0 (thin):   - via cup x4; pt swished all drinks likely d/t mucous in oral cavity, timely swallow, immediate weak cough x1  - via straw x8; timely swallow, WVQ x1    IDDSI 4 (puree): bites x2; prolonged bolus formation and A/P transit, delayed weak cough x2    IDDSI 5 (minced and moist): bites x3; prolonged bolus formation, A/P transit, mastication, delayed weak cough x3    IDDSI 6 (soft and bite sized): bites x2; prolonged bolus formation, A/P transit, mastication, delayed weak cough x2    3 oz water: PASS    Impressions:  79 y/o admitted d/t acute respiratory failure w/ hypoxia. Danielle Cooper SLP entry. RN reports pt coughing w/ thin this AM. Pt currently on 1.5L O2 via NC. Pt oriented x4. Upon entry, pt with secretions/mucous coating inside of lips, cheeks and lingual surface; SLP provided oral care. RN reports pt coughing up a lot of mucous; pt also reports mucous in throat. Noted pt with strong volitional cough but demonstrated weak coughs w/ PO trials.  Pt reports coughing w/ PO, stating, \"my uvula sometimes swells up and touches the back of my tongue and makes me cough. \" Pt reports this for past 2 weeks. Pt also reports having to take small bites and masticate for awhile to avoid globus sensation; if he does not, he has to cough up bite. During PO trials, pt w/ prolonged oral phase and demonstrated weak cough w/ each food trial, stating coughing is d/t his uvula swelling. W/ thin, pt w/ immediate cough x1 via cup and WVQ x1 via straw. Recommend minced and moist diet with thin liquids. Recommend FEES to fully assess swallow; pt agreeable - FEES vs. MBSS d/t pt c/o mucous. Discussed w/ RN who placed order. Diet recommendation: IDDSI 5 Minced and moist Solids; IDDSI 0 Thin Liquids; Meds whole in puree  Instrumentation: recommend FEES to fully assess swallow; RN placed order  Risk management: upright for all intake, stay upright for at least 30 mins after intake, small bites/sips, distant supervision with intake, oral care 2-3x/day to reduce adverse affects in the event of aspiration, alternate bites/sips, slow rate of intake, general GERD precautions, general aspiration precautions, and hold PO and contact SLP if s/s of aspiration or worsening respiratory status develop.     Prognosis: Guarded - Fair    Recommended Intervention:   [] Dysphagia tx  [] Videostroboscopy                      [] NPO   [] MBS       [] Speech/Cog Eval    [] Therapeutic PO Trials     [] Ice Chips   [] Other:  [x] FEES                                                 Dysphagia Therapeutic Intervention:   []  Bolus control Exercises  []  Oral Motor Exercises  []  [employer] Protocol  []  Thermal Stimulation  []  Oral Care    []  Vital Stim/NMES  []  Laryngeal Exercises  [x]  Patient/Family Education  []  Pharyngeal Exercises  []  Therapeutic PO trials with SLP  [x]  Diet tolerance monitoring  []  Other:     Referrals: N/A  [] ENT    [] PT  [] Pulmonology [] GI  [] Neurology  [] RD  [] OT   []     Goals:  Short

## 2022-11-19 NOTE — PROGRESS NOTES
Patient admitted to room 311 from ED room 10. 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 Patient with difficulty talking. Denies the need for oxygen at home, patient SOB with difficulty completing sentences. Patient with blanchable redness to buttocks and hips    Patient admitted to room 311 from ED. Patient oriented to room, call light, bed rails, phone, lights and bathroom. Patient instructed about the schedule of the day including: vital sign frequency, lab draws, possible tests, frequency of MD and staff rounds, daily weights, I &O's and prescribed diet. Telemetry box in place, patient aware of placement and reason. Bed locked, in lowest position, side rails up 2/4, call light within reach. Recliner Assessment  Patient is not able to demonstrate the ability to move from a reclining position to an upright position within the recliner due to patient states he does not walk and uses a wheelchair. 4 Eyes Skin Assessment     The patient is being assess for   Admission    I agree that 2 RN's have performed a thorough Head to Toe Skin Assessment on the patient. ALL assessment sites listed below have been assessed.       Areas assessed for pressure by both nurses:   [x]   Head, Face, and Ears   [x]   Shoulders, Back, and Chest, Abdomen  [x]   Arms, Elbows, and Hands   [x]   Coccyx, Sacrum, and Ischium  [x]   Legs, Feet, and Heels    Bilateral lower compression stockings on placed by wound care        Skin Assessed Under all Medical Devices by both nurses:  O2 device tubing and ace wrap, compression stockings            All Mepilex Borders were peeled back and area peeked at by both nurses:  Yes  Please list where Mepilex Borders are located:               **SHARE this note so that the co-signing nurse is able to place an eSignature**    Co-signer eSignature: {Esignature:[id number]}    Does the Patient have Skin Breakdown related to pressure?   No     (Insert Photo here)         Robby Prevention initiated:  Yes   Wound Care Orders initiated:  Yes, patient currenty a wound care patient treated BID weekly with home care       [id number]  nurse consulted for Pressure Injury (Stage 3,4, Unstageable, DTI, NWPT, Complex wounds)and New or Established Ostomies:  NA      Primary Nurse eSignature: Electronically signed by Mary Banks RN on 11/18/22 at 11:37 PM EST

## 2022-11-19 NOTE — PROGRESS NOTES
02 sat with difficult to monitor pleth, 02 monitor changed to sticker and right hand, placed on 2 liters 02, sat 90-95%

## 2022-11-19 NOTE — CONSULTS
Patient is being seen at the request of Cristian Larkin MD   for a consultation for hypoxia/effusion      HISTORY OF PRESENT ILLNESS:   80years old presented with shortness of breath. Severe. Worse with exertion and better with resting. Associated with hypoxemia. Duration for past week. No home O2. No cough or sputum. Clear sputum. No hemoptysis. Recent treatment for UTI by PCP. Bradycardia in ED, heart rate in the 30s required a dose of atropine. Work-up in ED revealed right pleural effusion with collapse of the right lung. Patient is on Coumadin with supratherapeutic INR. PAST MEDICAL HISTORY:  Past Medical History:   Diagnosis Date    Aortic valve stenosis     Arthritis     shoulder    Atrial fibrillation (HCC)     Hairy cell leukemia, in remission (HCC)     Hernia, abdominal     Hx of blood clots     Hypertension     Phlebitis     Pneumonia     Vertigo      PAST SURGICAL HISTORY:  Past Surgical History:   Procedure Laterality Date    ANOMALOUS PULMONARY VENOUS RETURN REPAIR, TOTAL      INTRACAPSULAR CATARACT EXTRACTION Left 03/27/2019    PHACO EMULSIFICATION OF CATARACT WITH INTRAOCULAR LENS IMPLANT EYE performed by Ian Baez MD at [facility] Right 04/03/2019    PHACO EMULSIFICATION OF CATARACT WITH INTRAOCULAR LENS IMPLANT EYE performed by Ian Baez MD at [address]      2017    SHOULDER SURGERY      SPLENECTOMY      TONSILLECTOMY         FAMILY HISTORY:  family history includes Arthritis in his mother; Cancer in his paternal grandmother; Rheumatologic Disease in his mother. SOCIAL HISTORY:   reports that he has never smoked.  He has never used smokeless tobacco.    Scheduled Meds:   atorvastatin  20 mg Oral Daily    finasteride  5 mg Oral Daily    sodium chloride flush  5-40 mL IntraVENous 2 times per day    furosemide  40 mg IntraVENous BID     Continuous Infusions:   sodium chloride       PRN Meds:  sodium chloride flush, sodium chloride, polyethylene glycol, acetaminophen **OR** acetaminophen    ALLERGIES:  Patient has No Known Allergies. REVIEW OF SYSTEMS:  Constitutional: Negative for fever  HENT: Negative for sore throat  Eyes: Negative for redness   Respiratory: + dyspnea, cough  Cardiovascular: Negative for chest pain  Gastrointestinal: Negative for vomiting, diarrhea   Genitourinary: Negative for hematuria   Musculoskeletal: Negative for arthralgias   Skin: Negative for rash  Neurological: Negative for syncope  Hematological: Negative for adenopathy  Psychiatric/Behavorial: Negative for anxiety    PHYSICAL EXAM:  Blood pressure (!) 144/73, pulse 56, temperature 97.3 °F (36.3 °C), temperature source Oral, resp. rate 21, height 5' 9\" (1.753 m), weight 162 lb 1.6 oz (73.5 kg), SpO2 98 %.' on 2LPM   Gen: No distress. Eyes: PERRL. No sclera icterus. No conjunctival injection. ENT: No discharge. Pharynx clear. Neck: Trachea midline. No obvious mass. Resp: Mild accessory muscle use. Few crackles. No wheezes. No rhonchi. Right dullness on percussion. CV: Regular rate. Regular rhythm. No murmur or rub. 1+ LE edema. GI: Non-tender. Non-distended. No hernia. Skin: Warm and dry. No nodule on exposed extremities. Lymph: No cervical LAD. No supraclavicular LAD. M/S: No cyanosis. No joint deformity. No clubbing. Neuro: Lethargic. Moves all four extremities. Psych: Oriented x 3. No anxiety.      LABS:  CBC:   Recent Labs     11/18/22 1856 11/19/22 0424   WBC 5.9 6.5   HGB 12.1* 10.6*   HCT 35.6* 31.2*   MCV 95.0 94.8   PLT 36* 60*     BMP:   Recent Labs     11/18/22 1856 11/19/22 0424    133*   K 4.3 4.2    96*   CO2 31 31   BUN 24* 23*   CREATININE 0.8 0.9     LIVER PROFILE:   Recent Labs     11/18/22 1856 11/19/22 0424   AST 22 20   ALT 19 16   BILITOT 0.7 0.5   ALKPHOS 105 85     PT/INR:   Recent Labs     11/18/22 1856 11/19/22 0424   PROTIME 37.1* 42.7*   INR 3.75* 4.47*     APTT: No results for input(s): APTT in the last 72 hours. UA:  Recent Labs     11/18/22  2115   COLORU Yellow   PHUR 6.0   WBCUA 10-20*   RBCUA >100*   MUCUS 1+*   BACTERIA 1+*   CLARITYU SL CLOUDY*   SPECGRAV >=1.030   LEUKOCYTESUR Negative   UROBILINOGEN 0.2   BILIRUBINUR Negative   BLOODU LARGE*   GLUCOSEU Negative     No results for input(s): PHART, NCZ1VYQ, PO2ART in the last 72 hours. CTA 11/18 imaging was reviewed by me and showed   No evidence of aortic dissection or aneurysm. There is diffuse calcific atherosclerotic disease particularly, aorto iliac   atherosclerotic disease. 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 There is a large low-attenuation right pleural effusion causing near complete compressive atelectasis of the right lung. Thoracentesis may be helpful. Airspace disease with volume loss in the lingula and left lower lobe may represent atelectasis and or pneumonia. Status post TAVR. Mild cardiomegaly    ASSESSMENT:  Acute hypoxemic respiratory failure  Large right pleural effusion with compressive atelectasis-suspecting due to CHF  SSS post pacemaker, Aortic stenosis post TAVR, Permanent A. fib on Coumadin  Chronic thrombocytopenia with MDS and history of hairy cell leukemia    PLAN:  Supplemental oxygen to maintain SaO2 >92%; wean as tolerated  Diuresis per internal medicine/cardiology  Diagnostic/therapeutic right-sided thoracentesis on INR is corrected.  Risks and benefits were discussed with patients    Repeat INR in am   D/W patient and he is DNR CCA, not interested in hospice

## 2022-11-19 NOTE — ED PROVIDER NOTES
Emergency Department Attending Provider Note  Location: [place] [address] ED  11/18/2022     Chief Complaint   Patient presents with    Shortness of Breath     Worse and worse SOB EMS reports sat 79% RA upon arrival. Pt is 96% on 3 liters at triage. PATIENT ID:  Angeles Hobson is a 80 y.o. male    Past Medical History:   Diagnosis Date    Aortic valve stenosis     Arthritis     shoulder    Atrial fibrillation (HCC)     Hairy cell leukemia, in remission (HCC)     Hernia, abdominal     Hx of blood clots     Hypertension     Phlebitis     Pneumonia     Vertigo        Angeles Hobson was evaluated in the Emergency Department for concerns of short of breath, arrives emergency department today initially hypoxic to 79% at his home, his abdomen is distended and pulsatile, and recently started on antibiotics by PCP for UTI. Patient says he has been progressively short of breath over the past week, but today he felt like he could not breathe, prompting him to come to ER. Is on warfarin. Patient has a pacemaker. Although initial history and physical exam information was obtained by CHRISTINA Rodriguez (who also dictated a record of this visit), I personally saw the patient and performed a substantive portion of the visit including all aspects of the medical decision making. EKG Interpretation:  Initial EKG obtained today in the emergency department shows a demand pacemaker, ventricular rate 70. Left bundle branch block, morphology is similar to previous EKG. Or atropine; please see below as patient was bradycardiac by pulse and by monitor in the 30s, similar morphology; pacemaker, ventricular rate 83. CTA CHEST ABDOMEN PELVIS W CONTRAST   Final Result   No evidence of aortic dissection or aneurysm. There is diffuse calcific atherosclerotic disease particularly, aorto iliac   atherosclerotic disease. 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       There is a large low-attenuation right pleural effusion causing near complete   compressive atelectasis of the right lung. Thoracentesis may be helpful. Airspace disease with volume loss in the lingula and left lower lobe may   represent atelectasis and or pneumonia. Status post TAVR. Mild cardiomegaly. XR CHEST PORTABLE   Final Result   Moderate size right pleural effusion. Shallow lung volumes.            Results for orders placed or performed during the hospital encounter of 11/18/22   COVID-19 & Influenza Combo     Specimen: Nasopharyngeal Swab   Result Value Ref Range     SARS-CoV-2 RNA, RT PCR NOT DETECTED NOT DETECTED     INFLUENZA A NOT DETECTED NOT DETECTED     INFLUENZA B NOT DETECTED NOT DETECTED   CBC with Auto Differential   Result Value Ref Range     WBC 5.9 4.0 - 11.0 K/uL     RBC 3.75 (L) 4.20 - 5.90 M/uL     Hemoglobin 12.1 (L) 13.5 - 17.5 g/dL     Hematocrit 35.6 (L) 40.5 - 52.5 %     MCV 95.0 80.0 - 100.0 fL     MCH 32.3 26.0 - 34.0 pg     MCHC 34.0 31.0 - 36.0 g/dL     RDW 16.6 (H) 12.4 - 15.4 %     Platelets 36 (L) 256 - 450 K/uL     MPV 9.8 5.0 - 10.5 fL     PLATELET SLIDE REVIEW Adequate       SLIDE REVIEW see below       Neutrophils % 76.4 %     Lymphocytes % 14.7 %     Monocytes % 8.6 %     Eosinophils % 0.0 %     Basophils % 0.3 %     Neutrophils Absolute 4.5 1.7 - 7.7 K/uL     Lymphocytes Absolute 0.9 (L) 1.0 - 5.1 K/uL     Monocytes Absolute 0.5 0.0 - 1.3 K/uL     Eosinophils Absolute 0.0 0.0 - 0.6 K/uL     Basophils Absolute 0.0 0.0 - 0.2 K/uL   Comprehensive Metabolic Panel w/ Reflex to MG   Result Value Ref Range     Sodium 137 136 - 145 mmol/L     Potassium reflex Magnesium 4.3 3.5 - 5.1 mmol/L     Chloride 100 99 - 110 mmol/L     CO2 31 21 - 32 mmol/L     Anion Gap 6 3 - 16     Glucose 119 (H) 70 - 99 mg/dL     BUN 24 (H) 7 - 20 mg/dL     Creatinine 0.8 0.8 - 1.3 mg/dL     Est, Glom Filt Rate >60 >60     Calcium 8.5 8.3 - 10.6 mg/dL     Total Protein 6.9 6.4 - 8.2 g/dL     Albumin 3.5 3.4 - 5.0 g/dL     Albumin/Globulin Ratio 1.0 (L) 1.1 - 2.2     Total Bilirubin 0.7 0.0 - 1.0 mg/dL     Alkaline Phosphatase 105 40 - 129 U/L     ALT 19 10 - 40 U/L     AST 22 15 - 37 U/L   Troponin   Result Value Ref Range     Troponin 0.04 (H) <0.01 ng/mL   Brain Natriuretic Peptide   Result Value Ref Range     Pro-BNP 6,860 (H) 0 - 449 pg/mL   Protime-INR   Result Value Ref Range     Protime 37.1 (H) 11.7 - 14.5 sec     INR 3.75 (H) 0.87 - 1.14   Urinalysis with Reflex to Culture     Specimen: Urine   Result Value Ref Range     Color, UA Yellow Straw/Yellow     Clarity, UA SL CLOUDY (A) Clear     Glucose, Ur Negative Negative mg/dL     Bilirubin Urine Negative Negative     Ketones, Urine Negative Negative mg/dL     Specific Gravity, UA >=1.030 1.005 - 1.030     Blood, Urine LARGE (A) Negative     pH, UA 6.0 5.0 - 8.0     Protein,  (A) Negative mg/dL     Urobilinogen, Urine 0.2 <2.0 E.U./dL     Nitrite, Urine Negative Negative     Leukocyte Esterase, Urine Negative Negative     Microscopic Examination YES       Urine Type NotGiven       Urine Reflex to Culture Yes     Lactate, Sepsis   Result Value Ref Range     Lactic Acid, Sepsis 1.3 0.4 - 1.9 mmol/L   Blood gas, venous   Result Value Ref Range     pH, Francisco 7.307 (L) 7.350 - 7.450     pCO2, Francisco 55.2 (H) 40.0 - 50.0 mmHg     pO2, Francisco 42.3 (H) 25.0 - 40.0 mmHg     HCO3, Venous 27.0 23.0 - 29.0 mmol/L     Base Excess, Francisco -0.2 -3.0 - 3.0 mmol/L     O2 Sat, Francisco 73 Not Established %     Carboxyhemoglobin 2.3 (H) 0.0 - 1.5 %     MetHgb, Francisco 0.3 <1.5 %     TC02 (Calc), Francisco 29 Not Established mmol/L     O2 Content, Francisco 13 Not Established VOL %     O2 Therapy Unknown     Blood gas, venous   Result Value Ref Range     pH, Francisco 7.337 (L) 7.350 - 7.450     pCO2, Francisco 54.5 (H) 40.0 - 50.0 mmHg     pO2, Francisco 161.2 (H) 25.0 - 40.0 mmHg     HCO3, Venous 28.5 23.0 - 29.0 mmol/L     Base Excess, Francisco 1.9 -3.0 - 3.0 mmol/L     O2 Sat, Francisco 99 Not Established % Carboxyhemoglobin 2.3 (H) 0.0 - 1.5 %     MetHgb, Francisco 0.0 <1.5 %     TC02 (Calc), Francisco 30 Not Established mmol/L     O2 Therapy Unknown     Microscopic Urinalysis   Result Value Ref Range     Mucus, UA 1+ (A) None Seen /LPF     WBC, UA 10-20 (A) 0 - 5 /HPF     RBC, UA >100 (A) 0 - 4 /HPF     Bacteria, UA 1+ (A) None Seen /HPF     Urinalysis Comments see below         PLAN:   -Patient seen and evaluated. Relevant records reviewed. Is a 80-year-old male history of pacemaker, presenting the emergency department today with concerns of bradycardia, hypoxia, shortness of breath, abdominal distention. Blood pressure stable. Called immediately in the bedside as patient's heart rate was measuring in the 30s, he is short of breath, and was placed on oxygen. I feel his pulse, and is quite concerning for bradycardia in the 30s. Gave 0.5 mg atropine, goes up to low 100s, repeat EKG shows paced rhythm, 83 bpm.  No obvious ischemic changes, however sinus arrhythmia, PVCs, and wide QTC. Placed a call out to cardiology. Additionally, point-of-care ultrasound obtained of the aorta, wall that is limited secondary to bowel gas, abdominal distention, I do see any free fluid in the abdomen. Above the umbilicus, I see the aorta, does not appear dilated. I cannot see anything infrarenally. 7:48 PM  Spoke with Dr. Tera Ricardo, cardiology, who says that now that we know his pacemaker is functioning correctly, nothing more to do at this time. Electrophysiology see him tomorrow. 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 Was given antibiotics, however likely will need diuresis and management of the effusion.   Admitted per PA documentation. Medications   atropine 1 MG/ML injection (  Not Given 11/18/22 1914)   atropine 1 MG/10ML injection (0.5 mg  Given 11/18/22 1914)         IMPRESSION:    ICD-10-CM    1. Acute respiratory failure with hypoxia (HCC)  J96.01       2. Pleural effusion on right  J90       3. Elevated brain natriuretic peptide (BNP) level  R79.89       4. Bradycardia  R00.1             I, Victoria Steiner DO am the primary clinician of record. I personally saw the patient and I independently provided 65 minutes of non-concurrent critical care out of the total shared critical care time provided. This chart was generated in part by using Dragon Dictation system and may contain errors related to that system including errors in grammar, punctuation, and spelling, as well as words and phrases that may be inappropriate. If there are any questions or concerns please feel free to contact the dictating provider for clarification.      VICTORIA STEINER DO  [facility] HospitalDO hector  11/20/22 1239

## 2022-11-19 NOTE — PROGRESS NOTES
Spoke with family. Patient and family are requesting Hospice/Palliative care information. Ordered Case management consult and palliative care consult.

## 2022-11-19 NOTE — ED NOTES
Pt. Chyna Diggs increasingly lethargic at this time with increased garbled speech. VBG sent. Pt. NC O2 now at 6L.      Carey Condon RN  11/18/22 2120

## 2022-11-19 NOTE — ACP (ADVANCE CARE PLANNING)
Advance Care Planning   The patient has the following advanced directives on file:  Advance Directives       Power of [address] ACP-Advance Directive ACP-Power of     Not on File Not on File Not on File Not on File            The patient has appointed the following active healthcare agents:    Primary Decision Maker: Mignon Barba - [phone]    The Patient has the following current code status:    Code Status: DNR-CCA    Visit Documentation:  I discussed [address] with pt's spouse today which included the patient's choices for care and treatment in the case of a health event that adversely affects decision-making abilities. 1. Goals of medical treatment were reviewed . 2. Patient's spouse stated goal/treatment preference is meeting with [address] [place] [address] today. 3. Others present during the discussion pt's spouse  4. The discussion lasted 45 minutes. Palliative Care Initial Note  Palliative Care Admit date:11/19/22    Advance Directives:pt's spouse would like code status to remain DNR-CCA at this time. Plan of care/goals: Reviewed chart. Pt asleep at this time. Met with the pt's spouse at bedside to discuss goals of care. Per spouse pt is saying \"just let me go\" and tired of fighting chronic illness. Per family pt has been declining over the past 10 weeks,however, was trying to be strong for his son's visit from [place]. Pt's son recently left back to [place], and pt now declining again per spouse. Hospice list provided to pt's spouse and referral called to [address] [place] [address]. Awaiting meeting time. Following. Social/Spiritual: Prayer Support    Plan: referral sent to [facility] meeting time.     Reason for consult:    _x__ Advance Care Planning  _x__ Transition of Care Planning  _x__ Psychosocial/Spiritual Support  _x__ Symptom Management    Andrea Plata RN  11/19/2022

## 2022-11-19 NOTE — PLAN OF CARE
Bedside swallow evaluation completed. Vaishnavi Willis M.A.  [address] [facility]  Speech Language Pathologist

## 2022-11-20 LAB
A/G RATIO: 1.1 (ref 1.1–2.2)
ALBUMIN SERPL-MCNC: 3.1 G/DL (ref 3.4–5)
ALP BLD-CCNC: 94 U/L (ref 40–129)
ALT SERPL-CCNC: 15 U/L (ref 10–40)
ANION GAP SERPL CALCULATED.3IONS-SCNC: 20 MMOL/L (ref 3–16)
ANISOCYTOSIS: ABNORMAL
AST SERPL-CCNC: 24 U/L (ref 15–37)
BASOPHILS ABSOLUTE: 0 K/UL (ref 0–0.2)
BASOPHILS RELATIVE PERCENT: 0.5 %
BILIRUB SERPL-MCNC: 0.6 MG/DL (ref 0–1)
BUN BLDV-MCNC: 26 MG/DL (ref 7–20)
CALCIUM SERPL-MCNC: 8.1 MG/DL (ref 8.3–10.6)
CHLORIDE BLD-SCNC: 101 MMOL/L (ref 99–110)
CO2: 24 MMOL/L (ref 21–32)
CREAT SERPL-MCNC: 1 MG/DL (ref 0.8–1.3)
EOSINOPHILS ABSOLUTE: 0 K/UL (ref 0–0.6)
EOSINOPHILS RELATIVE PERCENT: 0.1 %
GFR SERPL CREATININE-BSD FRML MDRD: >60 ML/MIN/{1.73_M2}
GLUCOSE BLD-MCNC: 74 MG/DL (ref 70–99)
HCT VFR BLD CALC: 31.7 % (ref 40.5–52.5)
HEMOGLOBIN: 11.1 G/DL (ref 13.5–17.5)
INR BLD: 1.63 (ref 0.87–1.14)
LYMPHOCYTES ABSOLUTE: 1.2 K/UL (ref 1–5.1)
LYMPHOCYTES RELATIVE PERCENT: 19.5 %
MCH RBC QN AUTO: 33 PG (ref 26–34)
MCHC RBC AUTO-ENTMCNC: 34.9 G/DL (ref 31–36)
MCV RBC AUTO: 94.6 FL (ref 80–100)
MONOCYTES ABSOLUTE: 0.5 K/UL (ref 0–1.3)
MONOCYTES RELATIVE PERCENT: 8 %
NEUTROPHILS ABSOLUTE: 4.6 K/UL (ref 1.7–7.7)
NEUTROPHILS RELATIVE PERCENT: 71.9 %
PDW BLD-RTO: 16.7 % (ref 12.4–15.4)
PLATELET # BLD: 46 K/UL (ref 135–450)
PLATELET SLIDE REVIEW: ABNORMAL
PMV BLD AUTO: 11 FL (ref 5–10.5)
POIKILOCYTES: ABNORMAL
POTASSIUM REFLEX MAGNESIUM: 4.1 MMOL/L (ref 3.5–5.1)
PROTHROMBIN TIME: 19.2 SEC (ref 11.7–14.5)
RBC # BLD: 3.35 M/UL (ref 4.2–5.9)
SLIDE REVIEW: ABNORMAL
SODIUM BLD-SCNC: 145 MMOL/L (ref 136–145)
TOTAL PROTEIN: 5.8 G/DL (ref 6.4–8.2)
URINE CULTURE, ROUTINE: NORMAL
WBC # BLD: 6.4 K/UL (ref 4–11)

## 2022-11-20 PROCEDURE — 85025 COMPLETE CBC W/AUTO DIFF WBC: CPT

## 2022-11-20 PROCEDURE — 99232 SBSQ HOSP IP/OBS MODERATE 35: CPT | Performed by: INTERNAL MEDICINE

## 2022-11-20 PROCEDURE — 94761 N-INVAS EAR/PLS OXIMETRY MLT: CPT

## 2022-11-20 PROCEDURE — 2700000000 HC OXYGEN THERAPY PER DAY

## 2022-11-20 PROCEDURE — 2060000000 HC ICU INTERMEDIATE R&B

## 2022-11-20 PROCEDURE — 36415 COLL VENOUS BLD VENIPUNCTURE: CPT

## 2022-11-20 PROCEDURE — 99233 SBSQ HOSP IP/OBS HIGH 50: CPT | Performed by: INTERNAL MEDICINE

## 2022-11-20 PROCEDURE — 6370000000 HC RX 637 (ALT 250 FOR IP): Performed by: INTERNAL MEDICINE

## 2022-11-20 PROCEDURE — 85610 PROTHROMBIN TIME: CPT

## 2022-11-20 PROCEDURE — 2580000003 HC RX 258: Performed by: INTERNAL MEDICINE

## 2022-11-20 PROCEDURE — 80053 COMPREHEN METABOLIC PANEL: CPT

## 2022-11-20 PROCEDURE — 6360000002 HC RX W HCPCS: Performed by: INTERNAL MEDICINE

## 2022-11-20 RX ADMIN — Medication 10 ML: at 23:33

## 2022-11-20 RX ADMIN — FUROSEMIDE 40 MG: 10 INJECTION, SOLUTION INTRAMUSCULAR; INTRAVENOUS at 09:00

## 2022-11-20 RX ADMIN — FINASTERIDE 5 MG: 5 TABLET, FILM COATED ORAL at 09:00

## 2022-11-20 RX ADMIN — FUROSEMIDE 40 MG: 10 INJECTION, SOLUTION INTRAMUSCULAR; INTRAVENOUS at 18:18

## 2022-11-20 NOTE — FLOWSHEET NOTE
11/20/22 0345   Vital Signs   Temp 97.5 °F (36.4 °C)   Temp Source Axillary   Heart Rate 75   Heart Rate Source Monitor   Resp 18   BP (!) 128/55   MAP (Calculated) 79   BP Location Right upper arm   BP Method Automatic   Patient Position Semi fowlers   Level of Consciousness 0   MEWS Score 1   Oxygen Therapy   SpO2 94 %   O2 Device Nasal cannula   O2 Flow Rate (L/min) 2 L/min   Vitals obtained, see above. Call light in reach. Bed locked and in lowest position. Bed alarm on. Pt denies any needs at this time.  Cain Ferguson RN

## 2022-11-20 NOTE — PROGRESS NOTES
[address]          Cardiology                                                                Progress Note    Admission date:  2022    Subjective:   CC CHF  HPI pt somnolent, but arousable. Rhythm remains AV paced with frequent PVC's. Vitals:  Blood pressure (!) 134/53, pulse 68, temperature 97.5 °F (36.4 °C), temperature source Axillary, resp. rate 20, height 5' 9\" (1.753 m), weight 163 lb (73.9 kg), SpO2 96 %.   Temp  Av.8 °F (36.6 °C)  Min: 97.5 °F (36.4 °C)  Max: 98.1 °F (36.7 °C)  Pulse  Av.5  Min: 68  Max: 75  BP  Min: 128/55  Max: 135/66  SpO2  Av.8 %  Min: 94 %  Max: 96 %    24 hour I/O    Intake/Output Summary (Last 24 hours) at 2022 0816  Last data filed at 2022 0407  Gross per 24 hour   Intake 222 ml   Output 3775 ml   Net -3553 ml       Objective:     Telemetry monitor: AV paced with PVCs    Physical Exam:  General Appearance:  cachectic  HEENT: pupils equal and reactive, no scleral  icterus  Skin:  Warm and dry  Heart:  Regular with frequent extrasystoles, normal apex, S1 and S2 normal  Lungs:  decreased breath sounds at the right base, no wheezing  Abd: soft, non tender  Extremities:  pressure dressings bilaterally, left foot externally rotated  Psych: normal affect, oriented X 3      Lab Review     Renal Profile:   Lab Results   Component Value Date/Time    CREATININE 1.0 2022 04:39 AM    BUN 26 2022 04:39 AM     2022 04:39 AM    K 4.1 2022 04:39 AM     2022 04:39 AM    CO2 24 2022 04:39 AM     CBC:    Lab Results   Component Value Date/Time    WBC 6.4 2022 04:39 AM    RBC 3.35 2022 04:39 AM    HGB 11.1 2022 04:39 AM    HCT 31.7 2022 04:39 AM    MCV 94.6 2022 04:39 AM    RDW 16.7 2022 04:39 AM    PLT 46 2022 04:39 AM     BNP:  No results found for: BNP  Fasting Lipid Panel:  No results found for: CHOL, HDL, TRIG  Cardiac Enzymes:  CK/MbTroponin  Lab Results   Component Value Date/Time    TROPONINI 0.04 11/18/2022 06:56 PM     PT/ INR   Lab Results   Component Value Date/Time    INR 1.63 11/20/2022 04:39 AM    INR 3.95 11/19/2022 12:53 PM    INR 4.47 11/19/2022 04:24 AM    PROTIME 19.2 11/20/2022 04:39 AM    PROTIME 38.7 11/19/2022 12:53 PM    PROTIME 42.7 11/19/2022 04:24 AM     PTT No results found for: PTT   Lab Results   Component Value Date/Time    MG 1.80 05/22/2022 05:55 AM    No results found for: TSH    Assessment:     Acute respiratory failure  Large right pleural effusion- new since 5/22  SSS- S/P DDD pacemaker implantation. Normal function. Frequent PVCs  PAF- 3% AF burden on last device check 10/14/22  AS- S/P TAVR    Plan:     IV diuretics- fluid balance -5.7 today. Renal function intact, BP ok.   Consider right thoracentesis  Resume warfarin if no procedures are anticipated      GLORIA Szymanski

## 2022-11-20 NOTE — PLAN OF CARE
Problem: Pain  Goal: Verbalizes/displays adequate comfort level or baseline comfort level  11/19/2022 2225 by Hossein Ferguson RN  Outcome: Progressing     Problem: ABCDS Injury Assessment  Goal: Absence of physical injury  11/19/2022 2225 by Hossein Ferguson RN  Outcome: Progressing  Flowsheets (Taken 11/19/2022 2225)  Absence of Physical Injury: Implement safety measures based on patient assessment     Problem: Safety - Adult  Goal: Free from fall injury  11/19/2022 2225 by Hossein Ferguson RN  Outcome: Progressing  Flowsheets (Taken 11/19/2022 2225)  Free From Fall Injury:   Instruct family/caregiver on patient safety   Based on caregiver fall risk screen, instruct family/caregiver to ask for assistance with transferring infant if caregiver noted to have fall risk factors

## 2022-11-20 NOTE — PROGRESS NOTES
Pulmonary Progress Note    CC: Hypoxia pleural effusion    Subjective:   Responding well to diuresis   Less short of breath         IV line: Peripheral IV      Intake/Output Summary (Last 24 hours) at 11/20/2022 0839  Last data filed at 11/20/2022 0407  Gross per 24 hour   Intake 222 ml   Output 3775 ml   Net -3553 ml       Exam:   BP (!) 134/53   Pulse 68   Temp 97.5 °F (36.4 °C) (Axillary)   Resp 20   Ht 5' 9\" (1.753 m)   Wt 163 lb (73.9 kg)   SpO2 96%   BMI 24.07 kg/m²  on 2 L O2  Gen: No distress. Eyes: PERRL. No sclera icterus. No conjunctival injection. ENT: No discharge. Pharynx clear. Neck: Trachea midline. No obvious mass. Resp: Mild accessory muscle use. Few crackles. No wheezes. No rhonchi. Right dullness on percussion. CV: Regular rate. Regular rhythm. No murmur or rub. 1+ LE edema. GI: Non-tender. Non-distended. No hernia. Skin: Warm and dry. No nodule on exposed extremities. Lymph: No cervical LAD. No supraclavicular LAD. M/S: No cyanosis. No joint deformity. No clubbing. Neuro: More alert and awake today. Moves all four extremities. Psych: Oriented x 3.  No anxiety    Scheduled Meds:   atorvastatin  20 mg Oral Daily    finasteride  5 mg Oral Daily    sodium chloride flush  5-40 mL IntraVENous 2 times per day    furosemide  40 mg IntraVENous BID     Continuous Infusions:   sodium chloride       PRN Meds:  sodium chloride flush, sodium chloride, polyethylene glycol, acetaminophen **OR** acetaminophen    Labs:  CBC:   Recent Labs     11/18/22 1856 11/19/22 0424 11/20/22 0439   WBC 5.9 6.5 6.4   HGB 12.1* 10.6* 11.1*   HCT 35.6* 31.2* 31.7*   MCV 95.0 94.8 94.6   PLT 36* 60* 46*     BMP:   Recent Labs     11/18/22 1856 11/19/22 0424 11/20/22 0439    133* 145   K 4.3 4.2 4.1    96* 101   CO2 31 31 24   BUN 24* 23* 26*   CREATININE 0.8 0.9 1.0     LIVER PROFILE:   Recent Labs     11/18/22  1856 11/19/22  0424 11/20/22  0439   AST 22 20 24   ALT 19 16 15 BILITOT 0.7 0.5 0.6   ALKPHOS 105 85 94     PT/INR:   Recent Labs     11/19/22  0424 11/19/22  1253 11/20/22  0439   PROTIME 42.7* 38.7* 19.2*   INR 4.47* 3.95* 1.63*     APTT: No results for input(s): APTT in the last 72 hours. UA:  Recent Labs     11/18/22  2115   COLORU Yellow   PHUR 6.0   WBCUA 10-20*   RBCUA >100*   MUCUS 1+*   BACTERIA 1+*   CLARITYU SL CLOUDY*   SPECGRAV >=1.030   LEUKOCYTESUR Negative   UROBILINOGEN 0.2   BILIRUBINUR Negative   BLOODU LARGE*   GLUCOSEU Negative     No results for input(s): PHART, HBY7OQW, PO2ART in the last 72 hours. Cultures:   11/15 BC NGTD  11/18 COVID-19 not detected    Films:  CTA 11/18 imaging was reviewed by me and showed   No evidence of aortic dissection or aneurysm. There is diffuse calcific atherosclerotic disease particularly, aorto iliac   atherosclerotic disease. 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 There is a large low-attenuation right pleural effusion causing near complete compressive atelectasis of the right lung. Thoracentesis may be helpful. Airspace disease with volume loss in the lingula and left lower lobe may represent atelectasis and or pneumonia. Status post TAVR. Mild cardiomegaly     ASSESSMENT:  Acute hypoxemic respiratory failure  Large right pleural effusion with compressive atelectasis-suspecting due to CHF  SSS post pacemaker, Aortic stenosis post TAVR, Permanent A. 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 Will need platelet transfusion if he elected to pursue diagnostic/therapeutic thoracentesis. Risks, benefits, alternatives were discussed with patient.   D/W patient and he is DNR CCA and noticed that he is meeting with hospice today. Coumadin on hold given thrombocytopenia for possible thoracentesis   Discussed with wife at the bedside.

## 2022-11-20 NOTE — CONSULTS
[address]                 [address] [address]                                  CONSULTATION    PATIENT NAME: Kate Marie               :        1931  MED REC NO:   [id number]                          ROOM:         ACCOUNT NO:   [de-identified]                           ADMIT DATE: 2022  PROVIDER:     Benjamin Sheppard MD    CONSULT DATE:  2022    REASON FOR CONSULTATION:  Arrhythmia. HISTORY OF PRESENT ILLNESS:  The patient is a 60-year-old man with  history of aortic valve disease status post TAVR, mildly dysplastic  syndrome, and complete AV block, who is admitted for worsening shortness  of breath. History is difficult to obtain, but the record indicates  that he has been short of breath for perhaps one to two weeks. I cannot  elicit any history of chest discomfort. At the time of admission,  cardiac rhythm is AV pacing  with frequent PVCs. Device interrogation  demonstrates normal functional  dual chamber pacemaker, which was  implanted on 2017. The patient  currently is about 30% atrial  pacing and 88% ventricular pacing. Atrial fibrillation is about 3%. He  did have an episode of atrial fibrillation on 2022, which was  about 2.5 hours in duration. He has been taking warfarin for stroke  prophylaxis. The platelet count on admission was 36,000. Chest CT on  admission demonstrates a large right pleural effusion causing  compression of the right lung. Laboratory evaluation is unremarkable  for a proBNP level of 6860. The troponin level is mildly elevated at  0.04. Most recent echo from [facility] on 2022 demonstrates  preserved left ventricular systolic function with ejection fraction of  55-60% and normal function of the aortic bioprosthesis. PAST MEDICAL HISTORY:  1. Complete AV block. 2.  Paroxysmal atrial fibrillation. 3.  Mildly dysplastic syndrome.   4.  Aortic stenosis, status post _____. 5.  Thrombocytopenia. 6.  Hairy cell leukemia, in remission. 7.  Status post splenectomy. MEDICATIONS:  At the time of admission include Lipitor 10 mg p.o. daily,  Proscar 5 mg p.o. daily, lisinopril 40 mg p.o. daily, and warfarin as  directed. ALLERGIES:  None known. SOCIAL HISTORY:  The patient does not smoke cigarettes. He does not  consume alcohol at this time. FAMILY HISTORY:  Remarkable for arthritis in the mother. There is no  known family history of cardiac rhythm disturbance. REVIEW OF SYSTEMS:  Difficult to obtain as the patient is somnolent at  this time. PHYSICAL EXAMINATION:  VITAL SIGNS:  Blood pressure is 144/73, heart rate is 60 beats per  minute and irregular. GENERAL:  The patient is arousable, but somnolent. He appeared  cachectic. HEENT:  Exam demonstrates normocephalic, atraumatic head. NECK:  Supple, without thyromegaly. LUNGS:  Demonstrates absent breath sounds at the right base. CARDIOVASCULAR:  Exam reveals a regular rhythm with frequent with  frequent _____. S1 and S2 are normal.  There is an audible 2/6  ejection-type systolic murmur at the left sternal border. The jugular  venous pressure is difficult to assess. ABDOMEN:  Lean, soft, nontender. EXTREMITIES:  Bilateral compression stockings. The left lower extremity  is externally rotated. DIAGNOSTIC DATA:  A 12-lead ECG from 11/18/2022 demonstrates sinus  rhythm with atrial sinus ventricular pacing and frequent PVC, which are  left bundle inferior in morphology. IMPRESSION:  1. Large right pleural effusion. 2.  Complete AV block with normal function dual-chamber pacemaker. 3.  Frequent PVCs. 4.  Elevated proBNP level. 5.  Elevated troponin level. 6.  Thrombocytopenia. The patient presents with shortness of breath for one to two weeks.   At  the time of admission, he found to have a large right pleural effusion,  which was not present on the previous chest x-ray of 05/20/2022. The  cardiac rhythm is sinus with ventricular pacing, frequent PVCs. It is  likely that the large right pleural effusion is approximate cause of his  shortness of breath. The contribution of frequent PVCs cannot be  excluded with certainty. A 2D echo will be benefited to evaluate left  ventricular systolic function. In view of his age and multiple  comorbidities, I would not pursue an invasive evaluation for the  elevated troponin level. RECOMMENDATIONS:  1. We would consider right-sided thoracentesis. 2.  Proceed with cautious diuresis. 3.  Discontinue warfarin in view of thrombocytopenia. 4.  A 2D echo on 11/21/2022.  5.  We would not pursue aggressive invasive evaluation with elevated  troponin level. Thank you for the opportunity to assist in the care of the patient. Please contact me if your have any questions regarding his evaluation.         Rajesh Perez MD    D: 11/19/2022 9:04:18       T: 11/20/2022 10:07:04     [email or web address]  Job#: [id number]     Doc#: [id number]    CC:

## 2022-11-20 NOTE — PROGRESS NOTES
Progress Note    Admit Date:  11/18/2022    Acute respiratory failure with hypoxia  Right pleural effusion with compressive atelectasis  Acute metabolic encephalopathy  Bradycardia    Subjective:  Mr. Elke Mcdaniel is alert and oriented x3 today. States he feels improved today. Fatigued and weak but does state his breathing is subjectively better. Down to 2 L of O2    Objective:   Patient Vitals for the past 4 hrs:   BP Temp Temp src Pulse Resp SpO2   11/20/22 0710 (!) 134/53 97.5 °F (36.4 °C) Axillary 68 20 96 %            Intake/Output Summary (Last 24 hours) at 11/20/2022 0829  Last data filed at 11/20/2022 0407  Gross per 24 hour   Intake 222 ml   Output 3775 ml   Net -3553 ml         Physical Exam:    Gen: No distress. Fatigued. Chronically ill appearing. Eyes: PERRL. No sclera icterus. No conjunctival injection. ENT: No discharge. Pharynx clear. Neck: No JVD. Trachea midline. Resp: +accessory muscle use. +Diminished breath sounds over the right lung fields. +LLL crackles. No wheezes. No rhonchi. CV: Irregular rate. Irregular rhythm. No murmur. No rub. No edema. Capillary Refill: Brisk,< 3 seconds   Peripheral Pulses: +2 palpable, equal bilaterally   GI: Non-tender. Non-distended. Normal bowel sounds. Skin: Warm and dry. No nodule on exposed extremities. No rash on exposed extremities. M/S: No cyanosis. No joint deformity. No clubbing. Neuro: Awake. Grossly nonfocal    Psych: Oriented x 3. No anxiety or agitation.           Scheduled Meds:   atorvastatin  20 mg Oral Daily    finasteride  5 mg Oral Daily    sodium chloride flush  5-40 mL IntraVENous 2 times per day    furosemide  40 mg IntraVENous BID       Continuous Infusions:   sodium chloride         PRN Meds:  sodium chloride flush, sodium chloride, polyethylene glycol, acetaminophen **OR** acetaminophen      Data:  CBC:   Recent Labs     11/18/22  1856 11/19/22  0424 11/20/22  0439   WBC 5.9 6.5 6.4   HGB 12.1* 10.6* 11.1*   HCT 35.6* 31.2* 31.7*   MCV 95.0 94.8 94.6   PLT 36* 60* 46*       BMP:   Recent Labs     11/18/22  1856 11/19/22  0424 11/20/22  0439    133* 145   K 4.3 4.2 4.1    96* 101   CO2 31 31 24   BUN 24* 23* 26*   CREATININE 0.8 0.9 1.0       LIVER PROFILE:   Recent Labs     11/18/22  1856 11/19/22  0424 11/20/22  0439   AST 22 20 24   ALT 19 16 15   BILITOT 0.7 0.5 0.6   ALKPHOS 105 85 94       PT/INR:   Recent Labs     11/19/22  0424 11/19/22  1253 11/20/22  0439   PROTIME 42.7* 38.7* 19.2*   INR 4.47* 3.95* 1.63*         CULTURES  Covid and flu not detected  Blood cx x2 in process  Ucx in process     RADIOLOGY  CTA CHEST ABDOMEN PELVIS W CONTRAST   Final Result   No evidence of aortic dissection or aneurysm. There is diffuse calcific atherosclerotic disease particularly, aorto iliac   atherosclerotic disease. 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 There is a large low-attenuation right pleural effusion causing near complete   compressive atelectasis of the right lung. Thoracentesis may be helpful. Airspace disease with volume loss in the lingula and left lower lobe may   represent atelectasis and or pneumonia. Status post TAVR. Mild cardiomegaly. XR CHEST PORTABLE   Final Result   Moderate size right pleural effusion. Shallow lung volumes. ECHO 2/8/22  Summary: Ao Valve prosthesis mfg is Kohli. Ao Valve prosthesis measures 29 mm. There is moderate concentric left ventricular hypertrophy. There is marked mitral annular calcification present. A bioprosthetic prosthesis is present in the aortic valve position. There is mild mitral regurgitation. There is a pacemaker lead in the right ventricle. Overall left ventricular ejection fraction is estimated to be 55-60%. The left ventricular wall motion is normal.   Right ventricular systolic pressure is mildly elevated at 35-45 mmHg. Mild - moderate tricuspid regurgitation is present.    The gradient is normal for this prosthetic aortic valve. Mild perivalvular aortic regurgitation. The left atrium is mildly dilated. The right atrium is mildly dilated. Assessment/Plan:  #Acute respiratory failure hypoxia  -no home O2   -requiring 3-5 L NC; seen on 1.5 today  -wean as tolerated    #Right pleural effusion with compressive atelectasis  -likely 2/2 above  -patient is on coumadin therapy for below; holding  -INR supratherapeutic, will need reversal prior to procedure  --INR 3.75 -> 4.47- s/p 1 dose Vit K--> 1.63  -Pulmonology consulted  I am not keen on getting a thoracentesis done because he also has low platelets    #Acute metabolic encephalopathy   -likely secondary to hypoxia  -mentation much better today; conversational, A&Ox3     #Elevated BNP  -no hx of CHF; clinically fluid overloaded   -lasix 40 BID  -cardiology consulted  -previous ECHO above    #Bradycardia  -Improved with dose of atropine in the ED  -cardiology consulted    #Sick sinus syndrome, status post permanent pacemaker  -pacemaker interrogated and deemed to be working properly    #Aortic stenosis status post TAVR  #Permanent atrial fibrillation   -on Coumadin therapy; holding with supratherapeutic INR    #Venous insufficiency   -f/w wound care clinic  -wound care consult for RLE wounds    #TCP - chronic  #Hx of hairy cell leukemia; in remission  Platelet 46 today    #BPH  -continue proscar    DVT Prophylaxis: Holding coumadin with supratherapeutic INR  Diet: ADULT DIET; Dysphagia - Minced and Moist  Code Status: DNR-MYKEL Sheets M.D.

## 2022-11-20 NOTE — FLOWSHEET NOTE
11/19/22 2015   Vital Signs   Temp 97.9 °F (36.6 °C)   Temp Source Axillary   Heart Rate 68   Heart Rate Source Monitor   Resp 20   /67   MAP (Calculated) 87   BP Location Right upper arm   BP Method Automatic   Patient Position Semi fowlers   Level of Consciousness 0   MEWS Score 1   Oxygen Therapy   SpO2 94 %   O2 Device Nasal cannula   O2 Flow Rate (L/min) 2 L/min   Pt awake and alert in bed. Vitals obtained. Oriented times 4. Shift assessment completed, see flow sheet. Pt denies any pain at this time. Pt ate a few bites of pudding but stated he has not ate for days and is concerned he will upset his stomach. Pt denies any further needs at this time. Call light in reach.    Jade Toth RN

## 2022-11-20 NOTE — PROGRESS NOTES
Patient assessed and AM medication given. Updated family on improvement. Patient eating and drinking without assistance today. Much more alert than yesterday. Patient denies any further needs at this time. Call light within reach.

## 2022-11-21 PROBLEM — R79.89 ELEVATED BRAIN NATRIURETIC PEPTIDE (BNP) LEVEL: Status: ACTIVE | Noted: 2022-11-21

## 2022-11-21 LAB
A/G RATIO: 1.1 (ref 1.1–2.2)
ALBUMIN SERPL-MCNC: 3.1 G/DL (ref 3.4–5)
ALP BLD-CCNC: 88 U/L (ref 40–129)
ALT SERPL-CCNC: 14 U/L (ref 10–40)
ANION GAP SERPL CALCULATED.3IONS-SCNC: 8 MMOL/L (ref 3–16)
AST SERPL-CCNC: 17 U/L (ref 15–37)
BASOPHILS ABSOLUTE: 0 K/UL (ref 0–0.2)
BASOPHILS RELATIVE PERCENT: 0.4 %
BILIRUB SERPL-MCNC: 0.7 MG/DL (ref 0–1)
BUN BLDV-MCNC: 31 MG/DL (ref 7–20)
CALCIUM SERPL-MCNC: 8.7 MG/DL (ref 8.3–10.6)
CHLORIDE BLD-SCNC: 95 MMOL/L (ref 99–110)
CO2: 39 MMOL/L (ref 21–32)
CREAT SERPL-MCNC: 0.9 MG/DL (ref 0.8–1.3)
EOSINOPHILS ABSOLUTE: 0 K/UL (ref 0–0.6)
EOSINOPHILS RELATIVE PERCENT: 0.1 %
GFR SERPL CREATININE-BSD FRML MDRD: >60 ML/MIN/{1.73_M2}
GLUCOSE BLD-MCNC: 98 MG/DL (ref 70–99)
HCT VFR BLD CALC: 34.7 % (ref 40.5–52.5)
HEMOGLOBIN: 11.8 G/DL (ref 13.5–17.5)
INR BLD: 1.19 (ref 0.87–1.14)
LYMPHOCYTES ABSOLUTE: 1.2 K/UL (ref 1–5.1)
LYMPHOCYTES RELATIVE PERCENT: 17.5 %
MAGNESIUM: 1.8 MG/DL (ref 1.8–2.4)
MCH RBC QN AUTO: 32.2 PG (ref 26–34)
MCHC RBC AUTO-ENTMCNC: 33.8 G/DL (ref 31–36)
MCV RBC AUTO: 95.3 FL (ref 80–100)
MONOCYTES ABSOLUTE: 0.7 K/UL (ref 0–1.3)
MONOCYTES RELATIVE PERCENT: 10 %
NEUTROPHILS ABSOLUTE: 5.1 K/UL (ref 1.7–7.7)
NEUTROPHILS RELATIVE PERCENT: 72 %
PDW BLD-RTO: 17 % (ref 12.4–15.4)
PLATELET # BLD: ABNORMAL K/UL (ref 135–450)
PMV BLD AUTO: 13.2 FL (ref 5–10.5)
POTASSIUM REFLEX MAGNESIUM: 3.3 MMOL/L (ref 3.5–5.1)
PROTHROMBIN TIME: 14.9 SEC (ref 11.7–14.5)
RBC # BLD: 3.65 M/UL (ref 4.2–5.9)
SLIDE REVIEW: ABNORMAL
SODIUM BLD-SCNC: 142 MMOL/L (ref 136–145)
TOTAL PROTEIN: 5.8 G/DL (ref 6.4–8.2)
WBC # BLD: 7.1 K/UL (ref 4–11)

## 2022-11-21 PROCEDURE — 85025 COMPLETE CBC W/AUTO DIFF WBC: CPT

## 2022-11-21 PROCEDURE — 93308 TTE F-UP OR LMTD: CPT

## 2022-11-21 PROCEDURE — 97530 THERAPEUTIC ACTIVITIES: CPT

## 2022-11-21 PROCEDURE — 94761 N-INVAS EAR/PLS OXIMETRY MLT: CPT

## 2022-11-21 PROCEDURE — 2580000003 HC RX 258: Performed by: INTERNAL MEDICINE

## 2022-11-21 PROCEDURE — 36415 COLL VENOUS BLD VENIPUNCTURE: CPT

## 2022-11-21 PROCEDURE — 6370000000 HC RX 637 (ALT 250 FOR IP): Performed by: INTERNAL MEDICINE

## 2022-11-21 PROCEDURE — 85610 PROTHROMBIN TIME: CPT

## 2022-11-21 PROCEDURE — 92612 ENDOSCOPY SWALLOW (FEES) VID: CPT

## 2022-11-21 PROCEDURE — 92526 ORAL FUNCTION THERAPY: CPT

## 2022-11-21 PROCEDURE — 83735 ASSAY OF MAGNESIUM: CPT

## 2022-11-21 PROCEDURE — 97165 OT EVAL LOW COMPLEX 30 MIN: CPT

## 2022-11-21 PROCEDURE — 99233 SBSQ HOSP IP/OBS HIGH 50: CPT | Performed by: INTERNAL MEDICINE

## 2022-11-21 PROCEDURE — 80053 COMPREHEN METABOLIC PANEL: CPT

## 2022-11-21 PROCEDURE — 99233 SBSQ HOSP IP/OBS HIGH 50: CPT | Performed by: NURSE PRACTITIONER

## 2022-11-21 PROCEDURE — 6360000002 HC RX W HCPCS: Performed by: INTERNAL MEDICINE

## 2022-11-21 PROCEDURE — 2700000000 HC OXYGEN THERAPY PER DAY

## 2022-11-21 PROCEDURE — 51702 INSERT TEMP BLADDER CATH: CPT

## 2022-11-21 PROCEDURE — 99232 SBSQ HOSP IP/OBS MODERATE 35: CPT | Performed by: INTERNAL MEDICINE

## 2022-11-21 PROCEDURE — 2060000000 HC ICU INTERMEDIATE R&B

## 2022-11-21 PROCEDURE — 97161 PT EVAL LOW COMPLEX 20 MIN: CPT

## 2022-11-21 RX ORDER — POTASSIUM CHLORIDE 20 MEQ/1
20 TABLET, EXTENDED RELEASE ORAL ONCE
Status: COMPLETED | OUTPATIENT
Start: 2022-11-21 | End: 2022-11-21

## 2022-11-21 RX ADMIN — Medication 10 ML: at 08:33

## 2022-11-21 RX ADMIN — FUROSEMIDE 40 MG: 10 INJECTION, SOLUTION INTRAMUSCULAR; INTRAVENOUS at 08:33

## 2022-11-21 RX ADMIN — ATORVASTATIN CALCIUM 20 MG: 10 TABLET, FILM COATED ORAL at 08:33

## 2022-11-21 RX ADMIN — FINASTERIDE 5 MG: 5 TABLET, FILM COATED ORAL at 08:33

## 2022-11-21 RX ADMIN — FUROSEMIDE 40 MG: 10 INJECTION, SOLUTION INTRAMUSCULAR; INTRAVENOUS at 18:25

## 2022-11-21 RX ADMIN — POTASSIUM CHLORIDE 20 MEQ: 1500 TABLET, EXTENDED RELEASE ORAL at 12:16

## 2022-11-21 RX ADMIN — Medication 10 ML: at 21:26

## 2022-11-21 ASSESSMENT — ENCOUNTER SYMPTOMS
GASTROINTESTINAL NEGATIVE: 1
SHORTNESS OF BREATH: 1

## 2022-11-21 NOTE — PROGRESS NOTES
Report given to [facility] RN. Pt stable, care transferred at this time.  John Juan RN

## 2022-11-21 NOTE — PROGRESS NOTES
[address]  Cardiology  Progress Note    Admission date:  2022    Reason for follow up visit: Reported bradycardia    HPI/CC: Eliel Hernandez is a 80 y.o. male who presented 2022 for SOB. Reported to be bradycardic HR 30s and received atropine. Pacemaker interrogation showed normal function. He has also been treated for respiratory failure and pleural effusion. Rhythm has been paced with PVCs. Subjective: Feels better. Shortness of breath improved, no chest pain. Some lightheadedness upon standing. Vitals:  Blood pressure 138/69, pulse 64, temperature 97.7 °F (36.5 °C), temperature source Axillary, resp. rate 18, height 5' 9\" (1.753 m), weight 139 lb 2 oz (63.1 kg), SpO2 97 %.   Temp  Av.5 °F (36.4 °C)  Min: 97.3 °F (36.3 °C)  Max: 97.7 °F (36.5 °C)  Pulse  Av  Min: 62  Max: 67  BP  Min: 126/71  Max: 138/69  SpO2  Av.3 %  Min: 94 %  Max: 97 %    24 hour I/O    Intake/Output Summary (Last 24 hours) at 2022 1330  Last data filed at 2022 0263  Gross per 24 hour   Intake 300 ml   Output 2400 ml   Net -2100 ml     Current Facility-Administered Medications   Medication Dose Route Frequency Provider Last Rate Last Admin    perflutren lipid microspheres (DEFINITY) injection 1.5 mL  1.5 mL IntraVENous ONCE PRN JULIETTE Ordonez - CNP        atorvastatin (LIPITOR) tablet 20 mg  20 mg Oral Daily Alise Tam MD   20 mg at 22 8904    finasteride (PROSCAR) tablet 5 mg  5 mg Oral Daily Alise Tam MD   5 mg at 22 7555    sodium chloride flush 0.9 % injection 5-40 mL  5-40 mL IntraVENous 2 times per day Alise Tam MD   10 mL at 22 9876    sodium chloride flush 0.9 % injection 5-40 mL  5-40 mL IntraVENous PRN Alise Tam MD        0.9 % sodium chloride infusion   IntraVENous PRN Alise Tam MD        polyethylene glycol (GLYCOLAX) packet 17 g  17 g Oral Daily PRN Alise Tam MD        acetaminophen (TYLENOL) tablet 650 mg 650 mg Oral Q6H PRN Laina Larsen MD        Or    acetaminophen (TYLENOL) suppository 650 mg  650 mg Rectal Q6H PRN Laina Larsen MD        furosemide (LASIX) injection 40 mg  40 mg IntraVENous BID Laina Larsen MD   40 mg at 11/21/22 4993     Review of Systems   Constitutional:  Positive for fatigue. Respiratory:  Positive for shortness of breath. Cardiovascular:  Positive for leg swelling. Negative for chest pain. Gastrointestinal: Negative. Neurological:  Positive for light-headedness. Objective:     Telemetry monitor: Paced with PVCs    Physical Exam:  Constitutional:  Comfortable and alert, NAD, appears stated age, fatigued  Eyes: PERRL, sclera nonicteric  Neck:  Supple, no masses, no thyroidmegaly, no significant JVD  Skin:  Warm and dry; no rash or lesions  Heart:  Irregular, normal apex, S1 and S2 normal, no M/G/R  Lungs:  Mildly increased respiratory effort; decreased  Abdomen: soft, non tender, + bowel sounds  Extremities:  +BLE compression stockings  Neuro: alert and oriented, moves legs and arms equally, normal mood and affect    Data Reviewed:    Echo 2/2022: Ao Valve prosthesis mfg is Kohli. Ao Valve prosthesis measures 29 mm. There is moderate concentric left ventricular hypertrophy. There is marked mitral annular calcification present. A bioprosthetic prosthesis is present in the aortic valve position. There is mild mitral regurgitation. There is a pacemaker lead in the right ventricle. Overall left ventricular ejection fraction is estimated to be 55-60%. The left ventricular wall motion is normal.   Right ventricular systolic pressure is mildly elevated at 35-45 mmHg. Mild - moderate tricuspid regurgitation is present. The gradient is normal for this prosthetic aortic valve. Mild perivalvular aortic regurgitation. The left atrium is mildly dilated. The right atrium is mildly dilated.      Lab Reviewed:     Renal Profile:  Lab Results   Component Value Date/Time    CREATININE 0.9 11/21/2022 05:04 AM    BUN 31 11/21/2022 05:04 AM     11/21/2022 05:04 AM    K 3.3 11/21/2022 05:04 AM    CL 95 11/21/2022 05:04 AM    CO2 39 11/21/2022 05:04 AM     CBC:    Lab Results   Component Value Date/Time    WBC 7.1 11/21/2022 05:04 AM    RBC 3.65 11/21/2022 05:04 AM    HGB 11.8 11/21/2022 05:04 AM    HCT 34.7 11/21/2022 05:04 AM    MCV 95.3 11/21/2022 05:04 AM    RDW 17.0 11/21/2022 05:04 AM    PLT see below 11/21/2022 05:04 AM     BNP:    Lab Results   Component Value Date/Time    PROBNP 6,860 11/18/2022 06:56 PM     Fasting Lipid Panel:  No results found for: CHOL, HDL, TRIG  Cardiac Enzymes:  CK/MbTroponin  Lab Results   Component Value Date/Time    TROPONINI 0.04 11/18/2022 06:56 PM     PT/ INR   Lab Results   Component Value Date/Time    INR 1.19 11/21/2022 05:05 AM    INR 1.63 11/20/2022 04:39 AM    INR 3.95 11/19/2022 12:53 PM    PROTIME 14.9 11/21/2022 05:05 AM    PROTIME 19.2 11/20/2022 04:39 AM    PROTIME 38.7 11/19/2022 12:53 PM     PTT No results found for: PTT   Lab Results   Component Value Date/Time    MG 1.80 11/21/2022 05:04 AM    No results found for: TSH    All labs and imaging reviewed today    Assessment:  Elevated troponin: 0.04; not symptoms concerning for angina  Frequent PVCs  Elevated BNP  Complete heart block s/p PPM 2017  Paroxysmal atrial fibrillation: stable   - [id number] score >2 on coumadin as outpatient  S/p TAVR 2020  Pleural effusion  Thrombocytopenia  BLE edema    Plan:   1. Limited echo for elevated troponin/EF  2. IV diuresis for pleural effusion; he declined thoracentesis  3. Monitor renal function  4.  Coumadin held due to thrombocytopenia; may need to discuss long term anticoagulation safety with primary cardiologist    Follows with Dr. Amanda Gallardo, [address] [address]  [phone]

## 2022-11-21 NOTE — PROGRESS NOTES
Progress Note    Admit Date:  11/18/2022    Acute respiratory failure with hypoxia  Right pleural effusion with compressive atelectasis  Acute metabolic encephalopathy  Bradycardia    Subjective:  Mr. Bryanna Monteiro is alert and oriented x3 today. States he feels improved today. Fatigued and weak but does state his breathing is subjectively better. Down to 2 L of O2    Objective:   Patient Vitals for the past 4 hrs:   BP Temp Temp src Pulse Resp SpO2   11/21/22 0709 138/69 97.7 °F (36.5 °C) Axillary 64 18 97 %            Intake/Output Summary (Last 24 hours) at 11/21/2022 0855  Last data filed at 11/21/2022 0224  Gross per 24 hour   Intake 300 ml   Output 2400 ml   Net -2100 ml         Physical Exam:    Gen: No distress. Fatigued. Chronically ill appearing. Eyes: PERRL. No sclera icterus. No conjunctival injection. ENT: No discharge. Pharynx clear. Neck: No JVD. Trachea midline. Resp: +accessory muscle use. +Diminished breath sounds over the right lung fields. +LLL crackles. No wheezes. No rhonchi. CV: Irregular rate. Irregular rhythm. No murmur. No rub. No edema. Capillary Refill: Brisk,< 3 seconds   Peripheral Pulses: +2 palpable, equal bilaterally   GI: Non-tender. Non-distended. Normal bowel sounds. Skin: Warm and dry. No nodule on exposed extremities. No rash on exposed extremities. M/S: No cyanosis. No joint deformity. No clubbing. Neuro: Awake. Grossly nonfocal    Psych: Oriented x 3. No anxiety or agitation.           Scheduled Meds:   atorvastatin  20 mg Oral Daily    finasteride  5 mg Oral Daily    sodium chloride flush  5-40 mL IntraVENous 2 times per day    furosemide  40 mg IntraVENous BID       Continuous Infusions:   sodium chloride         PRN Meds:  sodium chloride flush, sodium chloride, polyethylene glycol, acetaminophen **OR** acetaminophen      Data:  CBC:   Recent Labs     11/19/22  0424 11/20/22  0439 11/21/22  0504   WBC 6.5 6.4 7.1   HGB 10.6* 11.1* 11.8*   HCT 31.2* 31.7* 34.7*   MCV 94.8 94.6 95.3   PLT 60* 46* see below       BMP:   Recent Labs     11/19/22  0424 11/20/22  0439 11/21/22  0504   * 145 142   K 4.2 4.1 3.3*   CL 96* 101 95*   CO2 31 24 39*   BUN 23* 26* 31*   CREATININE 0.9 1.0 0.9       LIVER PROFILE:   Recent Labs     11/19/22  0424 11/20/22  0439 11/21/22  0504   AST 20 24 17   ALT 16 15 14   BILITOT 0.5 0.6 0.7   ALKPHOS 85 94 88       PT/INR:   Recent Labs     11/19/22  1253 11/20/22  0439 11/21/22  0505   PROTIME 38.7* 19.2* 14.9*   INR 3.95* 1.63* 1.19*         CULTURES  Covid and flu not detected  Blood cx x2 in process  Ucx in process     RADIOLOGY  CTA CHEST ABDOMEN PELVIS W CONTRAST   Final Result   No evidence of aortic dissection or aneurysm. There is diffuse calcific atherosclerotic disease particularly, aorto iliac   atherosclerotic disease. 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 There is a large low-attenuation right pleural effusion causing near complete   compressive atelectasis of the right lung. Thoracentesis may be helpful. Airspace disease with volume loss in the lingula and left lower lobe may   represent atelectasis and or pneumonia. Status post TAVR. Mild cardiomegaly. XR CHEST PORTABLE   Final Result   Moderate size right pleural effusion. Shallow lung volumes. ECHO 2/8/22  Summary: Ao Valve prosthesis mfg is Kohli. Ao Valve prosthesis measures 29 mm. There is moderate concentric left ventricular hypertrophy. There is marked mitral annular calcification present. A bioprosthetic prosthesis is present in the aortic valve position. There is mild mitral regurgitation. There is a pacemaker lead in the right ventricle. Overall left ventricular ejection fraction is estimated to be 55-60%. The left ventricular wall motion is normal.   Right ventricular systolic pressure is mildly elevated at 35-45 mmHg. Mild - moderate tricuspid regurgitation is present. The gradient is normal for this prosthetic aortic valve. Mild perivalvular aortic regurgitation. The left atrium is mildly dilated. The right atrium is mildly dilated. Assessment/Plan:  #Acute respiratory failure hypoxia  -no home O2   -requiring 3-5 L NC; seen on 1.5 today  -wean as tolerated    #Right pleural effusion with compressive atelectasis  -likely 2/2 above  -patient is on coumadin therapy for below; holding  -INR supratherapeutic, will need reversal prior to procedure  --INR 3.75 -> 4.47- s/p 1 dose Vit K--> 1.63  -Pulmonology consulted  Patient is not  keen on getting a thoracentesis     #Acute metabolic encephalopathy   -likely secondary to hypoxia  -mentation much better today; conversational, A&Ox3     #Elevated BNP  -no hx of CHF; clinically fluid overloaded   -lasix 40 BID  -cardiology consulted  -previous ECHO above    #Bradycardia  -Improved with dose of atropine in the ED  -cardiology consulted    #Sick sinus syndrome, status post permanent pacemaker  -pacemaker interrogated and deemed to be working properly    #Aortic stenosis status post TAVR  #Permanent atrial fibrillation   -on Coumadin therapy; holding with supratherapeutic INR    #Venous insufficiency   -f/w wound care clinic  -wound care consult for RLE wounds    #TCP - chronic  #Hx of hairy cell leukemia; in remission  Platelet 46 today    #BPH  -continue proscar    DVT Prophylaxis: Holding coumadin with supratherapeutic INR  Diet: ADULT DIET; Dysphagia - Minced and Moist  Code Status: DNR-CCA    Ot/pt      AMADO Sheets.

## 2022-11-21 NOTE — CONSULTS
Palliative Care Progress Note:11/21/2022    12:20 PM Parul with [address] met with the pt and pt's spouse and they are not interested in hospice services at this time. Plan is SNF for rehab. KAUSHIK Myles following for SNF referrals and d/c POC.     Faith Ladd RN Palliative Care

## 2022-11-21 NOTE — PROGRESS NOTES
Pulmonary Progress Note    CC: Hypoxia pleural effusion    Subjective:   Still declines thoracentesis      Intake/Output Summary (Last 24 hours) at 11/21/2022 1714  Last data filed at 11/21/2022 1342  Gross per 24 hour   Intake 600 ml   Output 2400 ml   Net -1800 ml         Exam:   /69   Pulse 66   Temp 97.2 °F (36.2 °C) (Oral)   Resp 18   Ht 5' 9\" (1.753 m)   Wt 139 lb 2 oz (63.1 kg)   SpO2 96%   BMI 20.55 kg/m²  on 2 L O2  Gen: No distress. Eyes: PERRL. No sclera icterus. No conjunctival injection. ENT: No discharge. Pharynx clear. Neck: Trachea midline. No obvious mass. Resp: Mild accessory muscle use. Few crackles. No wheezes. No rhonchi. Right dullness on percussion. CV: Regular rate. Regular rhythm. No murmur or rub. 1+ LE edema. GI: Non-tender. Non-distended. M/S: No cyanosis. No joint deformity. No clubbing. Neuro: More alert and awake today. Moves all four extremities. Psych: Oriented x 3.  No anxiety    Scheduled Meds:   atorvastatin  20 mg Oral Daily    finasteride  5 mg Oral Daily    sodium chloride flush  5-40 mL IntraVENous 2 times per day    furosemide  40 mg IntraVENous BID     Continuous Infusions:   sodium chloride       PRN Meds:  perflutren lipid microspheres, sodium chloride flush, sodium chloride, polyethylene glycol, acetaminophen **OR** acetaminophen    Labs:  CBC:   Recent Labs     11/19/22 0424 11/20/22 0439 11/21/22  0504   WBC 6.5 6.4 7.1   HGB 10.6* 11.1* 11.8*   HCT 31.2* 31.7* 34.7*   MCV 94.8 94.6 95.3   PLT 60* 46* see below       BMP:   Recent Labs     11/19/22 0424 11/20/22 0439 11/21/22  0504   * 145 142   K 4.2 4.1 3.3*   CL 96* 101 95*   CO2 31 24 39*   BUN 23* 26* 31*   CREATININE 0.9 1.0 0.9       LIVER PROFILE:   Recent Labs     11/19/22 0424 11/20/22 0439 11/21/22  0504   AST 20 24 17   ALT 16 15 14   BILITOT 0.5 0.6 0.7   ALKPHOS 85 94 88       PT/INR:   Recent Labs     11/19/22  1253 11/20/22  0439 11/21/22  0505   PROTIME 38.7* 19.2* 14.9*   INR 3.95* 1.63* 1.19*       APTT: No results for input(s): APTT in the last 72 hours. UA:  Recent Labs     11/18/22 2115   COLORU Yellow   PHUR 6.0   WBCUA 10-20*   RBCUA >100*   MUCUS 1+*   BACTERIA 1+*   CLARITYU SL CLOUDY*   SPECGRAV >=1.030   LEUKOCYTESUR Negative   UROBILINOGEN 0.2   BILIRUBINUR Negative   BLOODU LARGE*   GLUCOSEU Negative       No results for input(s): PHART, HXT6DYS, PO2ART in the last 72 hours. Cultures:   11/15 BC NGTD  11/18 COVID-19 not detected    Films:  CTA 11/18 imaging was reviewed by me and showed   No evidence of aortic dissection or aneurysm. There is diffuse calcific atherosclerotic disease particularly, aorto iliac   atherosclerotic disease. 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 There is a large low-attenuation right pleural effusion causing near complete compressive atelectasis of the right lung. Thoracentesis may be helpful. Airspace disease with volume loss in the lingula and left lower lobe may represent atelectasis and or pneumonia. Status post TAVR. Mild cardiomegaly     ASSESSMENT:  Acute hypoxemic respiratory failure  Large right pleural effusion with compressive atelectasis-suspecting due to CHF  SSS post pacemaker, Aortic stenosis post TAVR, Permanent A. fib on Coumadin  Chronic thrombocytopenia with MDS and history of hairy cell leukemia     PLAN:  Supplemental oxygen to maintain SaO2 >92%; wean as tolerated  Diuresis per internal medicine/cardiology-patient responding well with clinical improvement  Patient still declined thoracentesis today and would like to think about it. Will need platelet transfusion if he elected to pursue diagnostic/therapeutic thoracentesis. Risks, benefits, alternatives were discussed with patient. Coumadin on hold   Discussed with family at the bedside.

## 2022-11-21 NOTE — PROGRESS NOTES
Inpatient Physical Therapy Evaluation and Treatment    Unit: PCU  Date:  11/21/2022  Patient Name:    Robert Conteh  Admitting diagnosis:  Bradycardia [R00.1]  Pleural effusion on right [J90]  Elevated brain natriuretic peptide (BNP) level [R79.89]  Acute respiratory failure with hypoxia ([address] [J96.01]  Admit Date:  11/18/2022  Precautions/Restrictions/WB Status/ Lines/ Wounds/ Oxygen: Fall risk, Bed/chair alarm, Lines -IV, Supplemental O2 (2L), and Huang catheter, Telemetry, and Continuous pulse oximetry    Treatment Time:  11:20 - 12:00  Treatment Number:  1   Timed Code Treatment Minutes: 30 minutes  Total Treatment Minutes:  40  minutes    Patient Goals for Therapy: \" go home \"          Discharge Recommendations: SNF  DME needs for discharge: defer to facility       Therapy recommendation for EMS Transport: requires transport by cot due to requires lift equipment for transfers    Therapy recommendations for staff:   Assist of 2 with use of MONICA STEDY and gait belt for all transfers to/from [facility]  to/from [facility]    History of Present Illness:   Per Dr. Chappell Speaks: 80 y.o. male who presented to the hospital with a chief complaint with shortness of breath and hypoxia. The patient was brought in by squad. The patient apparently has been short of breath for the past week. His symptoms progressively got worse and today he felt he could not catch his breath. He was heavy breathing per his wife when EMS got there. He was hypoxic on room air when he was brought to the emergency department. The patient was very somnolent when I saw him and cannot provide history, the wife who was at his bedside was a very poor historian and could not give much history. He was recently treated for UTI by his PCP otherwise she says he has been fine except for this shortness of breath. In emergency department he was hypoxic on presentation and was noted to be bradycardic on the monitor into 30s.   He was given a dose of atropine with improvement of his heart rate. Of note the patient has a pacemaker in place which has been interrogated and deemed to be working properly. In emergency department work-up which included a CT of his chest abdomen pelvis was positive for significant right pleural effusion causing almost collapse of the right lung. There was also concern for pneumonitis. He will be admitted for further medical evaluation and treatment    Home Health S4 Level Recommendation:  NA  AM-PAC Mobility Score       AM-PAC Inpatient Mobility without Stair Climbing Raw Score : 8    Preadmission Environment    Pt. Lives with spouse  Home environment:  one story home (condo)  Steps to enter first floor: No steps  Steps to second floor: N/A  Bathroom: walk in shower, comfort height toilet, and grab bars  Equipment owned: RW, wc (manual and electric scooter), shower chair/bench, SPC, lift chair, and hospital bed    Preadmission Status:  Pt. Able to drive: No  Pt Fully independent with ADLs: No -  aide for bathing   Pt. Required assistance from family for: Bathing, Cleaning, and Laundry   Pt. independent for transfers and gait and walked with Rollator short distances bed to scooter or scooter to toilet  History of falls Yes - in July, fell out of recliner    [facility],  2x/month    Pain   Yes  Location: RLE  Rating: mild /10  Pain Medicine Status: No request made    Cognition    A&O x4   Able to follow 2 step commands    Subjective  Patient lying supine in bed with spouse present. Pt agreeable to this PT eval & tx. Upper Extremity ROM/Strength  Please see OT evaluation. Lower Extremity ROM / Strength   AROM WFL: No  ROM limitations: limited 2/2 weakness    BLE strength impaired, but not formally assessed with MMT.   BLE grossly 3/5    Lower Extremity Sensation    WFL    Lower Extremity Proprioception:   NT    Coordination and Tone  NT    Balance  Sitting:  Fair ; CGA and Min A   Comments: EOB, varying levels of assist    Standing: Poor; Min A  and 2 persons  Comments: with RW    Bed Mobility   Supine to Sit:    Mod A  and 2 persons  Sit to Supine:   Not Tested  Rolling:   Not Tested  Scooting in sitting: Mod A  and 2 persons  Scooting in supine:  Not Tested    Transfer Training     Sit to stand:   Min A  and 2 persons to RW     Mod A x 1 into STEDY  Stand to sit:   Mod A  from STEDY  Bed to Chair:   Total A and 2 persons with use of gait belt and MONICA STEDY    Gait gait deferred due to difficulty with transfers; pt ambulated 0 ft. Stair Training deferred, pt does not have stairs in home environment    Activity Tolerance   Pt completed therapy session with SOB noted with transfer    Supine at rest:  SpO2: 95% on 2L  HR: 81 bpm    Positioning Needs   Pt reclined in chair, alarm set, positioned in proper neutral alignment and pressure relief provided. Call light provided and all needs within reach    Exercises Initiated  Blue deferred secondary to treatment focus on functional mobility  NA    Other  None. Patient/Family Education   Pt educated on role of inpatient PT, POC, importance of continued activity, DC recommendations, safety awareness, transfer techniques, pacing activity, and calling for assist with mobility. Assessment  Pt seen for Physical Therapy evaluation in acute care setting. Pt demonstrated decreased Activity tolerance, Balance, ROM, Safety, and Strength as well as decreased independence with Ambulation, Bed Mobility , and Transfers. Recommending SNF upon discharge as patient functioning well below baseline, demonstrates good rehab potential and unable to return home due to burden of care beyond caregiver ability, home environment not conducive to patient recovery, and limited safety awareness. Goals : To be met in 3 visits:  1). Independent with LE Ex x 10 reps    To be met in 6 visits:  1). Supine to/from sit: Min A   2). Sit to/from stand: Min A   3).   Bed to chair: Min A 4).  Gait: Ambulate  10 ft.   with  Min A  and use of gait belt and rolling walker (RW)  5). Tolerate B LE exercises 3 sets of 10-15 reps    Rehabilitation Potential: Fair  Strengths for achieving goals include:   PLOF, Family Support, and Pt cooperative   Barriers to achieving goals include:    Weakness    Plan    To be seen 3-5 x / week  while in acute care setting for therapeutic exercises, bed mobility, transfers, progressive gait training, balance training, and family/patient education. Signature: Martha Duke PT, DPT, OMT-C  #[id number]      If patient discharges from this facility prior to next visit, this note will serve as the Discharge Summary.

## 2022-11-21 NOTE — CONSULTS
Pt is followed by [facility]. RLE has Compri 2 compression wrap in place. Pt was scheduled for [facility] visit tomorrow. Will change dressing tomorrow as originally scheduled per [facility] orders.   Discussed with wife and staff rn

## 2022-11-21 NOTE — PROGRESS NOTES
Inpatient Occupational Therapy  Evaluation and Treatment    Unit: PCU  Date:  11/21/2022  Patient Name:    Angeles Hobson  Admitting diagnosis:  Bradycardia [R00.1]  Pleural effusion on right [J90]  Elevated brain natriuretic peptide (BNP) level [R79.89]  Acute respiratory failure with hypoxia ([address] [J96.01]  Admit Date:  11/18/2022  Precautions/Restrictions/WB Status/ Lines/ Wounds/ Oxygen: Fall risk, Bed/chair alarm, Lines -IV, Supplemental O2 (2L), and Huang catheter, Telemetry, and Continuous pulse oximetry    Treatment Time:  11:20-12:00  Treatment Number: 1     Billable Treatment Time: 30 minutes   Total Treatment Time:   40   minutes    Patient Goals for Therapy:  \" to go home \"      Discharge Recommendations: SNF  DME needs for discharge: defer to facility       Therapy recommendations for staff:   Assist of 2 with use of MONICA STEDY for all transfers to/from BSC/chair    History of Present Illness: Per Dr. Roberts Cocks: 80 y.o. male who presented to the hospital with a chief complaint with shortness of breath and hypoxia. The patient was brought in by squad. The patient apparently has been short of breath for the past week. His symptoms progressively got worse and today he felt he could not catch his breath. He was heavy breathing per his wife when EMS got there. He was hypoxic on room air when he was brought to the emergency department. The patient was very somnolent when I saw him and cannot provide history, the wife who was at his bedside was a very poor historian and could not give much history. He was recently treated for UTI by his PCP otherwise she says he has been fine except for this shortness of breath. In emergency department he was hypoxic on presentation and was noted to be bradycardic on the monitor into 30s. He was given a dose of atropine with improvement of his heart rate. Of note the patient has a pacemaker in place which has been interrogated and deemed to be working properly. In emergency department work-up which included a CT of his chest abdomen pelvis was positive for significant right pleural effusion causing almost collapse of the right lung. There was also concern for pneumonitis. He will be admitted for further medical evaluation and treatment    Home Health S4 Level Recommendation:  NA  AM-PAC Score: AM-PAC Inpatient Daily Activity Raw Score: 13    Preadmission Environment    Pt. Lives with spouse  Home environment:    one story home (condo)  Steps to enter first floor: No steps  Steps to second floor: N/A  Bathroom: walk in shower, comfort height toilet, and grab bars  Equipment owned: RW, wc (manual and electric scooter), shower chair/bench, SPC, lift chair, and hospital bed     Preadmission Status:  Pt. Able to drive: No  Pt Fully independent with ADLs: No - HH aide for bathing   Pt. Required assistance from family for: Bathing, Cleaning, and Laundry   Pt. independent for transfers and gait and walked with Rollator short distances bed to scooter or scooter to toilet  History of falls Yes - in July, fell out of recliner     Home OT/PT and [place] aides,  2x/month     Pain   Yes  Location: RLE  Rating: mild /10  Pain Medicine Status: No request made    Cognition    A&O x4   Able to follow 2 step commands    Subjective  Patient lying supine in bed with no family present   Pt agreeable to this OT eval & tx. Upper Extremity ROM:    B shoulder flexion <90 degrees    Upper Extremity Strength:    BUE strength impaired but not formally assessed w/ MMT    Upper Extremity Sensation    WFL    Upper Extremity Proprioception:  Impaired    Coordination and Tone  Impaired    Balance  Functional Sitting Balance:  Diminished- SBA  Functional Standing Balance:Impaired- Mod A    Bed mobility:    Supine to sit:   Mod A  Sit to supine:   Not Tested  Rolling:    Min A  Scooting in sitting:   Mod A  Scooting to head of bed:   Not Tested    Bridging:   Not Tested    Transfers:    Sit to stand:  Min A of 2 from EOB to RW. Mod A of 1 from EOB to STEDY  Stand to sit:  Mod A  Bed to chair:   Total A via Stedy  Standard toilet: Not Tested  Bed to [facility]:  Not Tested    Dressing:      UE:   Not Tested  LE:    Not Tested    Bathing:    UE:  Not Tested  LE:  Not Tested    Eating:   Not Tested    Toileting:  Not Tested    Grooming: Not Tested    Activity Tolerance   Pt completed therapy session with SOB noted with transfer     Supine at rest:  SpO2: 95% on 2L  HR: 81 bpm    Positioning Needs:   Reclined in chair with call light and needs in reach. Alarm Set    Exercise / Activities Initiated:   N/A    Patient/Family Education:   Role of OT  Recommendations for DC  Safe RW use/hand placement    Assessment of Deficits: Pt seen for Occupational therapy evaluation in acute care setting. Pt demonstrated decreased Activity tolerance, ADLs, IADLs, Balance , Bathing, Bed mobility, Dressing, ROM, Safety Awareness, Strength, Transfers, Cognition, and Coping Skills. Pt functioning below baseline and will likely benefit from skilled occupational therapy services to maximize safety and independence. Goal(s) : To be met in 3 Visits:  1). Bed to toilet/BSC: Min A and with use of RW    To be met in 5 Visits:  1). Supine to/from Sit:  Min A  2). Upper Body Bathing:   Min A  3). Lower Body Bathing:   Max A  4). Upper Body Dressing:  Min A  5). Lower Body Dressing:  Max A  6). Pt to demonstrate UE exs x 15 reps with minimal cues    Rehabilitation Potential:  Fair for goals listed above. Strengths for achieving goals include: Pt motivated, PLOF, and Pt cooperative  Barriers to achieving goals include:  Complexity of condition     Plan: To be seen 3-5 x/wk while in acute care setting for therapeutic exercises, bed mobility, transfers, dressing, bathing, family/patient education, ADL/IADL retraining, energy conservation training.        HENRI Galindo/L #[id number]      If patient discharges from this facility prior to next visit, this note will serve as the Discharge Summary

## 2022-11-21 NOTE — PROGRESS NOTES
[facility] and chart review complete. . Assured the family we will be available to them when they make their decision, but at this time they are not interested in Hospice care. I have updated Michele Oro RN and [place] [facility] .         Any questions please call [phone]      Thank you for letting us be part of the patient's care team      Guiseppe REBOLLAR, [facility]  [phone]

## 2022-11-21 NOTE — FLOWSHEET NOTE
11/21/22 0330   Vital Signs   Temp 97.3 °F (36.3 °C)   Temp Source Oral   Heart Rate 63   Heart Rate Source Monitor   Resp 18   /64   MAP (Calculated) 85   BP Location Right upper arm   BP Method Automatic   Patient Position Semi fowlers   Level of Consciousness 0   MEWS Score 1   Oxygen Therapy   SpO2 95 %   O2 Device Nasal cannula   O2 Flow Rate (L/min) 2 L/min   Vitals obtained see above. Pt resting in bed with eyes closed. Bed locked and in lowest position. Call light in reach.  Jodee Coleman RN

## 2022-11-21 NOTE — PROGRESS NOTES
Pt awake and alert in bed, but states he is very tired and wants to sleep . Oriented times 4. Shift assessment completed, see flow sheet. Pt denies any pain at this time. No meds due at this time. Pt denies any further needs at this time. Call light in reach.    Kathy Smith RN

## 2022-11-21 NOTE — PLAN OF CARE
Problem: ABCDS Injury Assessment  Goal: Absence of physical injury  11/20/2022 2347 by Aquiles Ryder RN  Outcome: Progressing     Problem: Safety - Adult  Goal: Free from fall injury  11/20/2022 2347 by Aquiles Ryder RN  Outcome: Progressing

## 2022-11-21 NOTE — PROGRESS NOTES
Speech Language Pathology  THE [facility] THERAPY  Fiberoptic Endoscopic Evaluation of Swallowing  (FEES)        RECOMMENDATIONS: Continue Minced and moist (IDDSI 5) diet with thin liquid (IDDSI 0), meds whole in puree. Consider ENT consult in the future to assess vocal fold integrity and function. NAME: Heena Gift [facility]  YOB: 1931  GENDER: male  MRN: [id number]  ACCOUNT #: [de-identified]  REFERRING PHYSICIAN: [facility]  DIAGNOSIS: acute respiratory failure with hypoxia  Onset Date: Pt admitted to [facility] on 11/18/22  PAIN: no c/o pain    EXAM DATE: 11/21/22  EXAM LOCATION: [facility] [place]    CASE HISTORY:  Per MD H&P, \"80 y.o. male who presented to the hospital with a chief complaint with shortness of breath and hypoxia. The patient was brought in by squad. The patient apparently has been short of breath for the past week. His symptoms progressively got worse and today he felt he could not catch his breath. He was heavy breathing per his wife when EMS got there. He was hypoxic on room air when he was brought to the emergency department. The patient was very somnolent when I saw him and cannot provide history, the wife who was at his bedside was a very poor historian and could not give much history. He was recently treated for UTI by his PCP otherwise she says he has been fine except for this shortness of breath. In emergency department he was hypoxic on presentation and was noted to be bradycardic on the monitor into 30s. He was given a dose of atropine with improvement of his heart rate. Of note the patient has a pacemaker in place which has been interrogated and deemed to be working properly. In emergency department work-up which included a CT of his chest abdomen pelvis was positive for significant right pleural effusion causing almost collapse of the right lung. There was also concern for pneumonitis.   He will be admitted for further medical evaluation and treatment\". Per BSE completed 11/19/22, \"81 y/o admitted d/t acute respiratory failure w/ hypoxia. Le Langford SLP entry. RN reports pt coughing w/ thin this AM. Pt currently on 1.5L O2 via NC. Pt oriented x4. Upon entry, pt with secretions/mucous coating inside of lips, cheeks and lingual surface; SLP provided oral care. RN reports pt coughing up a lot of mucous; pt also reports mucous in throat. Noted pt with strong volitional cough but demonstrated weak coughs w/ PO trials. Pt reports coughing w/ PO, stating, \"my uvula sometimes swells up and touches the back of my tongue and makes me cough. \" Pt reports this for past 2 weeks. Pt also reports having to take small bites and masticate for awhile to avoid globus sensation; if he does not, he has to cough up bite. During PO trials, pt w/ prolonged oral phase and demonstrated weak cough w/ each food trial, stating coughing is d/t his uvula swelling. W/ thin, pt w/ immediate cough x1 via cup and WVQ x1 via straw. Recommend minced and moist diet with thin liquids. Recommend FEES to fully assess swallow; pt agreeable - FEES vs. MBSS d/t pt c/o mucous. Discussed w/ RN who placed order\". FLEXIBLE ENDOSCOPIC EXAM: Endoscope initially placed in pt's R nare with significant narrowing of nasal passages noted. Endoscope then removed, placed in pt's L nare, and passed without difficulty. TESTING POSITION: upright in bed. Pt currently on 1.5 L O2 NC    ORAL MOTOR FUNCTION:  WFL    BASE OF TONGUE RANGE OF MOTION: WFL    LATERAL PHARYNGEAL WALL RANGE OF MOTION: WFL    VELOPHARYNGEAL FUNCTION: adequate. Elongated uvula noted.      TRUE VOCAL FOLDS: mobile bilaterally; small round protrusions noted along posterior aspect of bilateral TVFs, as well as, anterior glottal gap    FALSE VOCAL FOLDS: WFL    ARYTENOID CARTILAGE: mild edema, L > R     ARYEPIGLOTTIC FOLDS: WFL    INTERARYTENOID SPACE: WFL    BASELINE SECRETIONS: n/a     EPIGLOTTIC RIM RESTING POSITION: off BOT     VOCAL PITCH: reduced    VOCAL INTENSITY: reduced    VOCAL QUALITY: weak, hoarse    RESPIRATORY SUPPORT FOR PHONATION: somewhat reduced    SUSTAINED PHONATION: ~3 seconds    COUGH ABILITY (VOLITIONAL / SPONTANEOUS): adequate    SALIVA SWALLOW ABILITY (VOLITIONAL / SPONTANEOUS): somewhat reduced    THROAT CLEAR ON CUE: adequate    THROAT DISCOMFORT: none reported    SPEECH INTELLIGIBILITY: WFL            PO TRIALS    THIN LIQUID (STRAW): premature spillage to the valleculae where swallow triggered. No penetration/aspiration. No significant pharyngeal residue noted post-swallow. PUREE FOOD TSP: grossly timely swallow initiation without significant premature spillage noted. No penetration/aspiration. No significant pharyngeal residue noted post-swallow. MECHANICAL SOFT (bites of jello): grossly timely swallow initiation without significant premature spillage noted. No penetration/aspiration. No significant pharyngeal residue noted post-swallow. REGULAR SOLIDS: mildly prolonged mastication time required with small bite of deangelo cracker. No significant premature spillage noted, no penetration/aspiration, no significant pharyngeal residue observed post-swallow.      BACKFLOW OF SECRETIONS: n/a    COMPENSATORY STRATEGIES / POSTURAL CHANGES TRIALED: small bites/sips, upright positioning with PO intake, alternate bites/sips, double swallow      PENETRATION-ASPIRATION SCALE (PAS)  [] 8 Material enters the airway, passes below the vocal folds, and no effort is made to eject  [] 7 Material enters the airway, passes below the vocal folds, and is not ejected from the trachea despite effort  [] 6 Material enters the airway, passes below the vocal folds, and is ejected into the larynx or out of the airway  [] 5 Material enters the airway, contacts the vocal folds, and is not ejected into the airway  [] 4 Material enters the airway, contacts the vocal folds, and is ejected from the airway  [] 3 Material enters the airway, remains above the vocal folds, and is not ejected from airway  [] 2 Material enters the airway, remains above the vocal folds, and is ejected from airway  [x] 1 Material does not enter the airway      IMPRESSION:  Trace white-tinged secretions noted along false vocal folds (cleared w/ cued cough) and bilateral pyriforms upon endoscope entry. Elongated uvula noted. True vocal folds mobile bilaterally, although small round protrusions noted along posterior aspect of bilateral TVFs, as well as, anterior glottal gap. No penetration/aspiration noted with PO trials during study, no significant pharyngeal residue noted post-swallow with PO.  Mildly prolonged mastication time required for small bite of regular solid. No instances of bolus-colored backflow or backflow of secretions noted at UES. Dysphagia Treatment Goals  Short Term Goals:  Timeframe for Short Term Goals: (5 days 11/24/22)  Goal 1: The patient will tolerate recommended diet with no clinical s/s of aspiration 5/5  Goal 2: The patient/caregiver will demonstrate understanding of compensatory swallow strategies, for improved swallow safety  Goal 3: The patient will tolerate instrumental assessment when able. GOAL MET; FEES completed 10/21  Long Term Goals:   Timeframe for Long Term Goals: (7 days 11/26/22)  Goal 1: The patient will tolerate least restrictive diet with no clinical s/s of aspiration or worsening respiratory/pulmonary status      ENDOSCOPE REMOVAL: Endoscope was removed without incident, no adverse reactions. No bleeding.    PRE TEST HR: 58  PRE TEST O2: 95%  POST TEST HR: 60  POST TEST O2: 100%    RECOMMENDED COMPENSATORY STRATEGIES / POSTURAL CHANGES: HOB 90* and 30\" after meals; small bites/sips; alternate solids/liquids every 3-5 bites; oral care after every meal      EDUCATION:  Reviewed results and recommendations of this evaluation, signs, symptoms, and risk of aspiration, as well as diet recommendations and strategies. Reviewed and discussed goals and plan of care with pt and RN.       TREATMENT TIME:  8684-0523 (40 minutes); FEES procedure and dysphagia tx    ELECTRONIC SIGNATURE:  Nikita Dasilva M.S. [id number] [facility]  Speech-language pathologist  [phone]

## 2022-11-21 NOTE — PROGRESS NOTES
Managing EP:  [facility]  [address]  [place], [address] [facility]  [phone]    Additional Notes:  Patient Name: Rose Mary Carlisle  Reason for Check: short of breath  DEVICE ASSESSMENT:  Available daily battery/lead measurements within expected range. Lead trends stable. ARRHYTHMIA SUMMARY:  Since 11Nov2020  Based on programmed zones, device detected/treated:  8 monitored VT episodes most recent [id number]  118 monitored AT/AF episodes most recent [id number]  See Cardiac Compass trends for AT/AF burden. See rate histograms for ventricular rates during AT/AF. See attached episode list/stored EGMs for details. Recommend review of stored EGMs for rhythm  determination.   OBSERVATIONS:  Current EGM: atrial sensing and pacing/ventricular pacing with noted possible ectopy

## 2022-11-21 NOTE — CARE COORDINATION
INTERDISCIPLINARY PLAN OF CARE CONFERENCE    Date/Time: 11/21/2022 2:22 PM  Completed by: Chu Gallardo RN, Case Management      Patient Name:  Dwayne Blackman  YOB: 1931  Admitting Diagnosis: Bradycardia [R00.1]  Pleural effusion on right [J90]  Elevated brain natriuretic peptide (BNP) level [R79.89]  Acute respiratory failure with hypoxia ([facility] [address] [J96.01]     Admit Date/Time:  11/18/2022  6:30 PM    Chart reviewed. Interdisciplinary team contacted or reviewed plan related to patient progress and discharge plans. Disciplines included Case Management, Nursing, and Dietitian. Current Status: IP 11/18/2022  PT/OT recommendation for discharge plan of care: [facility]  Discharge Recommendations: SNF  DME needs for discharge: defer to facility  Expected D/C Disposition:  Home  Confirmed plan with patient and spouse at bedside  Discharge Plan Comments: Chart reviewed. Met with pt and spouse at bedside and explained the role of the CM. Plans to go to rehab. Wants #1 [facility] (not in network) or #2 [facility]- referral sent for review. Will need pre-cert when accepted.  +CM following      Home O2 in place on admit: No  Pt informed of need to bring portable home O2 tank on day of discharge for nursing to connect prior to leaving:  Not Indicated  Verbalized agreement/Understanding:  Not Indicated

## 2022-11-22 ENCOUNTER — HOSPITAL ENCOUNTER (OUTPATIENT)
Dept: WOUND CARE | Age: 87
Discharge: HOME | End: 2022-11-22

## 2022-11-22 DIAGNOSIS — L97.212 VENOUS STASIS ULCER OF RIGHT CALF WITH FAT LAYER EXPOSED WITHOUT VARICOSE VEINS (HCC): Primary | ICD-10-CM

## 2022-11-22 DIAGNOSIS — I87.2 VENOUS STASIS ULCER OF RIGHT CALF WITH FAT LAYER EXPOSED WITHOUT VARICOSE VEINS (HCC): Primary | ICD-10-CM

## 2022-11-22 LAB — BLOOD CULTURE, ROUTINE: NORMAL

## 2022-11-22 PROCEDURE — 2700000000 HC OXYGEN THERAPY PER DAY

## 2022-11-22 PROCEDURE — 2060000000 HC ICU INTERMEDIATE R&B

## 2022-11-22 PROCEDURE — 6370000000 HC RX 637 (ALT 250 FOR IP): Performed by: INTERNAL MEDICINE

## 2022-11-22 PROCEDURE — 2580000003 HC RX 258: Performed by: INTERNAL MEDICINE

## 2022-11-22 PROCEDURE — 99232 SBSQ HOSP IP/OBS MODERATE 35: CPT | Performed by: NURSE PRACTITIONER

## 2022-11-22 PROCEDURE — 99232 SBSQ HOSP IP/OBS MODERATE 35: CPT | Performed by: INTERNAL MEDICINE

## 2022-11-22 PROCEDURE — 94761 N-INVAS EAR/PLS OXIMETRY MLT: CPT

## 2022-11-22 PROCEDURE — 6360000002 HC RX W HCPCS: Performed by: INTERNAL MEDICINE

## 2022-11-22 PROCEDURE — 92526 ORAL FUNCTION THERAPY: CPT

## 2022-11-22 RX ORDER — WARFARIN SODIUM 5 MG/1
5 TABLET ORAL
Status: COMPLETED | OUTPATIENT
Start: 2022-11-22 | End: 2022-11-22

## 2022-11-22 RX ORDER — FUROSEMIDE 40 MG/1
40 TABLET ORAL DAILY
Status: DISCONTINUED | OUTPATIENT
Start: 2022-11-22 | End: 2022-11-23 | Stop reason: HOSPADM

## 2022-11-22 RX ADMIN — Medication 10 ML: at 07:59

## 2022-11-22 RX ADMIN — ATORVASTATIN CALCIUM 20 MG: 10 TABLET, FILM COATED ORAL at 07:59

## 2022-11-22 RX ADMIN — FINASTERIDE 5 MG: 5 TABLET, FILM COATED ORAL at 07:59

## 2022-11-22 RX ADMIN — FUROSEMIDE 40 MG: 10 INJECTION, SOLUTION INTRAMUSCULAR; INTRAVENOUS at 07:59

## 2022-11-22 RX ADMIN — FUROSEMIDE 40 MG: 40 TABLET ORAL at 17:43

## 2022-11-22 RX ADMIN — WARFARIN SODIUM 5 MG: 5 TABLET ORAL at 17:43

## 2022-11-22 ASSESSMENT — ENCOUNTER SYMPTOMS
GASTROINTESTINAL NEGATIVE: 1
SHORTNESS OF BREATH: 1

## 2022-11-22 NOTE — PROGRESS NOTES
Pharmacy Note  Warfarin Consult  Dx: Afib  Goal INR range 2-3   Home Warfarin dose:5mg M/Th and 2.5mg on all other days      Date  INR  Warfarin  11/22             1.19                    5mg  Recommend Warfarin 5mg tonight x1. Daily INR ordered. Rx will continue to manage therapy per consult order.   Antonio Felix Pharm D 11/22/202211:53 AM  .

## 2022-11-22 NOTE — PROGRESS NOTES
Occupational Therapy    Attempted PT/OT treatment this afternoon. Pt was found sleeping. Declined ADL session and UE there ex. Will follow-up tomorrow as pt status allows.     Cherie Davis [address], OTR/L  #[id number]

## 2022-11-22 NOTE — PROGRESS NOTES
[facility]  Cardiology  Progress Note    Admission date:  2022    Reason for follow up visit: Reported bradycardia    HPI/CC: Janae Goode is a 80 y.o. male who presented 2022 for SOB. Reported to be bradycardic HR 30s and received atropine. Pacemaker interrogation showed normal function. He has also been treated for respiratory failure and pleural effusion. Echo showed EF 40-45%. Rhythm has been paced with PVCs. Subjective: Feels better. Shortness of breath improved, no chest pain. Vitals:  Blood pressure (!) 142/59, pulse 62, temperature 97.5 °F (36.4 °C), temperature source Axillary, resp. rate 16, height 5' 9\" (1.753 m), weight 141 lb 8 oz (64.2 kg), SpO2 98 %.   Temp  Av.5 °F (36.4 °C)  Min: 97.2 °F (36.2 °C)  Max: 97.7 °F (36.5 °C)  Pulse  Av.3  Min: 54  Max: 66  BP  Min: 123/61  Max: 142/59  SpO2  Av.8 %  Min: 91 %  Max: 98 %    24 hour I/O    Intake/Output Summary (Last 24 hours) at 2022 1239  Last data filed at 2022 0516  Gross per 24 hour   Intake 300 ml   Output 1000 ml   Net -700 ml       Current Facility-Administered Medications   Medication Dose Route Frequency Provider Last Rate Last Admin    furosemide (LASIX) tablet 40 mg  40 mg Oral Daily Verito Huynh MD        warfarin placeholder: dosing by pharmacy   Other Selene Judge MD        warfarin (COUMADIN) tablet 5 mg  5 mg Oral Once Verito Huynh MD        perflutren lipid microspheres (DEFINITY) injection 1.5 mL  1.5 mL IntraVENous ONCE PRN JULIETTE Garay - RADHA        atorvastatin (LIPITOR) tablet 20 mg  20 mg Oral Daily Maricruz Yadav MD   20 mg at 22 075    finasteride (PROSCAR) tablet 5 mg  5 mg Oral Daily Maricruz Yadav MD   5 mg at 22 0759    sodium chloride flush 0.9 % injection 5-40 mL  5-40 mL IntraVENous 2 times per day Maricruz Yadav MD   10 mL at 22 0759    sodium chloride flush 0.9 % injection 5-40 mL  5-40 mL IntraVENous PRN Cruz Carrasco MD        0.9 % sodium chloride infusion   IntraVENous PRN Cruz Carrasco MD        polyethylene glycol (GLYCOLAX) packet 17 g  17 g Oral Daily PRN Cruz Carrasco MD        acetaminophen (TYLENOL) tablet 650 mg  650 mg Oral Q6H PRN Cruz Carrasco MD        Or    acetaminophen (TYLENOL) suppository 650 mg  650 mg Rectal Q6H PRN Cruz Carrasco MD         Review of Systems   Constitutional:  Positive for fatigue. Respiratory:  Positive for shortness of breath. Cardiovascular:  Positive for leg swelling. Negative for chest pain. Gastrointestinal: Negative. Neurological:  Positive for light-headedness. Objective:     Telemetry monitor: Paced with PVCs    Physical Exam:  Constitutional:  Comfortable and alert, NAD, appears stated age, fatigued  Eyes: PERRL, sclera nonicteric  Neck:  Supple, no masses, no thyroidmegaly, no significant JVD  Skin:  Warm and dry; no rash or lesions  Heart:  Irregular, normal apex, S1 and S2 normal, no M/G/R  Lungs:  Mildly increased respiratory effort; decreased  Abdomen: soft, non tender, + bowel sounds  Extremities:  +BLE compression stockings  Neuro: alert and oriented, moves legs and arms equally, normal mood and affect    Data Reviewed:    Echo 11/2022: This is a limited study for elevated troponin. Left ventricular systolic function is reduced with ejection fraction   estimated at 40-45%. Wall motion is hard to assess due to ventricular paced   and ectopy. There is mild global hypokinesis and dysynchrony of the inferoseptal and   anteroseptal wall segments. Pacemaker / ICD lead was visualized in the right atrium and right ventricle. 2-8-222 [facility], 55-60%, normal LV systolic fuction. Echo 2/2022: Ao Valve prosthesis mfg is [employer]. Ao Valve prosthesis measures 29 mm. There is moderate concentric left ventricular hypertrophy. There is marked mitral annular calcification present.    A bioprosthetic prosthesis is present in the aortic valve position. There is mild mitral regurgitation. There is a pacemaker lead in the right ventricle. Overall left ventricular ejection fraction is estimated to be 55-60%. The left ventricular wall motion is normal.   Right ventricular systolic pressure is mildly elevated at 35-45 mmHg. Mild - moderate tricuspid regurgitation is present. The gradient is normal for this prosthetic aortic valve. Mild perivalvular aortic regurgitation. The left atrium is mildly dilated. The right atrium is mildly dilated.      Lab Reviewed:     Renal Profile:  Lab Results   Component Value Date/Time    CREATININE 0.9 11/21/2022 05:04 AM    BUN 31 11/21/2022 05:04 AM     11/21/2022 05:04 AM    K 3.3 11/21/2022 05:04 AM    CL 95 11/21/2022 05:04 AM    CO2 39 11/21/2022 05:04 AM     CBC:    Lab Results   Component Value Date/Time    WBC 7.1 11/21/2022 05:04 AM    RBC 3.65 11/21/2022 05:04 AM    HGB 11.8 11/21/2022 05:04 AM    HCT 34.7 11/21/2022 05:04 AM    MCV 95.3 11/21/2022 05:04 AM    RDW 17.0 11/21/2022 05:04 AM    PLT see below 11/21/2022 05:04 AM     BNP:    Lab Results   Component Value Date/Time    PROBNP 6,860 11/18/2022 06:56 PM     Fasting Lipid Panel:  No results found for: CHOL, HDL, TRIG  Cardiac Enzymes:  CK/MbTroponin  Lab Results   Component Value Date/Time    TROPONINI 0.04 11/18/2022 06:56 PM     PT/ INR   Lab Results   Component Value Date/Time    INR 1.19 11/21/2022 05:05 AM    INR 1.63 11/20/2022 04:39 AM    INR 3.95 11/19/2022 12:53 PM    PROTIME 14.9 11/21/2022 05:05 AM    PROTIME 19.2 11/20/2022 04:39 AM    PROTIME 38.7 11/19/2022 12:53 PM     PTT No results found for: PTT   Lab Results   Component Value Date/Time    MG 1.80 11/21/2022 05:04 AM    No results found for: TSH    All labs and imaging reviewed today    Assessment:  Elevated troponin: 0.04; not symptoms concerning for angina  Frequent PVCs  Elevated BNP  Cardiomyopathy, etiology unknown: EF 40-45% on echo 11/2022  Complete heart block s/p PPM 2017  Paroxysmal atrial fibrillation: stable   - [id number] score >2 on coumadin as outpatient  S/p TAVR 2020  Pleural effusion: declined thoracentesis   Thrombocytopenia  BLE edema    Plan:   1. Discussed echo results and new mild cardiomyopathy with patient/family member. Given his age/wishes, would not recommend ischemic evaluation, medical management only. 2. Continue home lisinopril; would add toprol for PVCs and cardiomyopathy (he has ppm) however patient declines additional medications currently, seems he wants to focus on comfort care only. Has met with hospice but did not pursue. Plan is to discharge to SNF. 3. IV lasix changed to po  4. Monitor renal function  5. Coumadin held initially due to thrombocytopenia; may need to discuss long term anticoagulation safety with primary cardiologist  6. Cardiology will sign off.  He will follow up with primary cardiologist.     Clif Richardson with Dr. Maxine Ozuna, [address] [address]  [phone]

## 2022-11-22 NOTE — PROGRESS NOTES
Speech Language Pathology  Facility/Department: [facility] PCU TELEMETRY  Dysphagia Daily Treatment Note    NAME: Kristen Whelan  : 1931  MRN: [id number]    Patient Diagnosis(es):   Patient Active Problem List    Diagnosis Date Noted    Elevated brain natriuretic peptide (BNP) level 2022    Pacemaker-[employer]  2022    Pleural effusion on right     Metabolic encephalopathy     SSS (sick sinus syndrome) ([facility] [address] 2022    Bradycardia 2022    Atelectasis 2022    Acute respiratory failure with hypoxia ([address] 2022    Venous stasis ulcer of right calf with fat layer exposed without varicose veins (HCC) 2022    Closed head injury     Elbow injury, right, initial encounter     Skin tear of right elbow without complication     Anticoagulated     Chronic anemia     Pulmonary infiltrates     Unable to ambulate 06/15/2021    Elbow effusion, left     Supratherapeutic INR     Atrial fibrillation (HCC)     S/P TAVR (transcatheter aortic valve replacement)     Thrombocytopenia (HCC)     Essential hypertension     S/P splenectomy     Hairy cell leukemia, in remission ([facility] [address]      Subjective: Pt reports he has an occasional cough, but that it is improved today. Described his swallowing as \"fine\" today, ate cream of wheat for morning meal and ordered soup for lunch. Stated his desire to continue the \"softer\" menu for ease of mastication    Pain: coccyx area pain, improved with repositioning    Current Diet: ADULT DIET; Dysphagia - Minced and Moist, Thin liquids    Diet Tolerance:  Patient tolerating current diet level without signs/symptoms of penetration / aspiration. Per RN, pt having difficulty swallowing pills whole with puree and requested to swallow them with water    P.O. Trials:   Thin   X By cup    Nectar / Mildly Thick       Honey / Moderately Thick       Pudding / Extremely Thick       Puree   X Cream soup   Solid   X Crushed crackers into soup consistent with minced and moist texture     Dysphagia Treatment and Impressions:  Per FEES 11/21: TVF mobile bilaterally; small round protrusions noted along posterior aspect of bilateral TVFs, as well as, anterior glottal gap. Discussed rec for ENT referral. Pt endorsed that his hoarseness is new. Explained rationale for ENT assessment of hoarseness, to which pt stated his understanding, but also \"I don't need to know about any more problems\"  On 2L O2 per NC, Sp02 98%, denied dyspnea  Set up for toothbrushing prior to PO trials, which pt completed efficiently  Pt observed swallowing minced and moist solids and thin liquids with no aspiration symptoms. Provided education re importance of upright positioning with PO to reduce reflux and promote esophageal clearance as pt with hx hiatal hernia and remote hx regurgitation with food. Pt stated his understanding and intention to \"keeping being careful\" by taking small bites. Provided education re impressions from FEES yesterday and importance of oral hygiene for oral infection control. Pt stated his appreciation for education provided and asked appropriate questions. Dysphagia Goals:  Timeframe for Long-term Goals: 7 days (11/26/22)  Goal 1: The patient will tolerate least restrictive diet with no clinical s/s of aspiration or worsening respiratory/pulmonary status 11/22: ongoing, progressing     Short-term Goals  Timeframe for Short-term Goals: 5 days (11/24/22)  The patient will tolerate recommended diet without observed clinical signs of aspiration 11/22: ongoing, progressing  The patient/caregiver will demonstrate understanding of compensatory strategies for improved swallowing safety.  11/22: ongoing, progressing  The patient will tolerate instrumental swallowing procedure 11/21 MET, FEES completed    Speech/Language/Cog Goals:  N/a      Recommendations:  Solid Consistency: IDDSI 5 Minced and Moist solids  Liquid Consistency: IDDSI 0 Thin liquids  Medication: whole with puree or with thin liquid per pt preference    Patient/Family/Caregiver Education: completed, as above    Compensatory Strategies: Sit up for all meals and thereafter for 30 minutes and Alternate solids with liquids, toothbrushing after meals/before bedtime    Plan:    Continued Dysphagia treatment 1-2x/wk with goals per plan of care. Discharge Recommendations: no further ST anticipated after discharge  If pt discharges from hospital prior to Speech/Swallowing discharge, this note serves as tx and discharge summary. Total Treatment Time / Charges     Time in Time out Total Time / units   Cognitive Tx         Speech Tx      Dysphagia Tx 1150 1220 30 min/ 1 unit tx     Signature: Hayde Engel MS CCC-SLP  Speech Language Pathologist        Calvin Wynne, SLP

## 2022-11-22 NOTE — CARE COORDINATION
INTERDISCIPLINARY PLAN OF CARE CONFERENCE    Date/Time: 11/22/2022 8:48 AM  Completed by: Norma Cobian RN, Case Management      Patient Name:  Micha Mclean  YOB: 1931  Admitting Diagnosis: Bradycardia [R00.1]  Pleural effusion on right [J90]  Elevated brain natriuretic peptide (BNP) level [R79.89]  Acute respiratory failure with hypoxia ([address] [J96.01]     Admit Date/Time:  11/18/2022  6:30 PM    Chart reviewed. Interdisciplinary team contacted or reviewed plan related to patient progress and discharge plans. Disciplines included Case Management, Nursing, and Dietitian. Current Status: I)P 11/18/2022  PT/OT recommendation for discharge plan of care: [facility] 8  Discharge Recommendations: SNF  DME needs for discharge: defer to facility  Expected D/C Disposition:  Skilled nursing facility    Discharge Plan Comments: Chart reviewed. Multiple referral out for SNF. [facility]- out of network  [facility] - No STR beds  [facility]- Not in [address] [place] [address] Street - no bed  [facility] - cannot meet needs  [facility]- Accepted. [phone] submitted.  Reference #[id number]

## 2022-11-22 NOTE — CONSULTS
Palliative Care Progress Note:  11/22/2022  Plan continues SNF for rehab. See CM notes,possible d/c tomorrow. PC will sign off unless consulted.     [facility], RN Palliative Care

## 2022-11-22 NOTE — CONSULTS
[facility] Wound Ostomy Continence Nurse  Consult Note       NAME:  49402Mahendra Cole RECORD NUMBER:  [id number]  AGE: 80 y.o. GENDER: male  : 1931  TODAY'S DATE:  2022    Subjective  Pt is alert and oriented, keeping eyes closed during dressing change but conversing. Reason for WOCN Evaluation and Assessment: RLE      Asmita Swain is a 80 y.o. male referred by:   [x] Physician  [x] Nursing  [] Other:     Wound Identification:  Wound Type: venous  Contributing Factors: edema and venous stasis    Pt seen for wound care to the [facility]. Pt is followed in the [address] [place] [address] by Dr Jacqueline Alicea weekly. Last seen last Tuesday by NP when Dr Jacqueline Alicea out of town. Will continue current orders from [address] [place] [address].  2 layer Compression wraps obtained from [address] [place] [address],[address]     Patient Goal of Care:  [x] Wound Healing  [] Odor Control  [] Palliative Care  [] Pain Control   [] Other:         PAST MEDICAL HISTORY        Diagnosis Date    Aortic valve stenosis     Arthritis     shoulder    Atrial fibrillation (HCC)     Hairy cell leukemia, in remission (HCC)     Hernia, abdominal     Hx of blood clots     Hypertension     Phlebitis     Pneumonia     Vertigo        PAST SURGICAL HISTORY    Past Surgical History:   Procedure Laterality Date    ANOMALOUS PULMONARY VENOUS RETURN REPAIR, TOTAL      INTRACAPSULAR CATARACT EXTRACTION Left 2019    PHACO EMULSIFICATION OF CATARACT WITH INTRAOCULAR LENS IMPLANT EYE performed by Salvador Jones MD at [facility] Right 2019    PHACO EMULSIFICATION OF CATARACT WITH INTRAOCULAR LENS IMPLANT EYE performed by Salvador Jones MD at [facility]      2017    SHOULDER SURGERY      SPLENECTOMY      TONSILLECTOMY         FAMILY HISTORY    Family History   Problem Relation Age of Onset    Rheumatologic Disease Mother     Arthritis Mother     Cancer Paternal Grandmother     Anesth Problems Neg Hx     Broken Bones Neg Hx     Clotting Disorder Neg Hx Collagen Disease Neg Hx     Diabetes Neg Hx     Dislocations Neg Hx     Osteoporosis Neg Hx     Scoliosis Neg Hx     Severe Sprains Neg Hx        SOCIAL HISTORY    Social History     Tobacco Use    Smoking status: Never    Smokeless tobacco: Never   Vaping Use    Vaping Use: Never used   Substance Use Topics    Alcohol use: Never    Drug use: No       ALLERGIES    No Known Allergies    MEDICATIONS    No current facility-administered medications on file prior to encounter.      Current Outpatient Medications on File Prior to Encounter   Medication Sig Dispense Refill    psyllium (METAMUCIL) 28 % packet Take 1 packet by mouth as needed      finasteride (PROSCAR) 5 MG tablet Take 5 mg by mouth daily      atorvastatin (LIPITOR) 10 MG tablet Take 20 mg by mouth daily      warfarin (COUMADIN) 5 MG tablet Take 5 mg by mouth daily Monday and Thursday - 5 mg, rest of the days 2.5 mg      Multiple Vitamins-Minerals (THERAPEUTIC MULTIVITAMIN-MINERALS) tablet Take 1 tablet by mouth daily      lisinopril (PRINIVIL;ZESTRIL) 40 MG tablet daily         Objective    BP (!) 119/56   Pulse 67   Temp 97.3 °F (36.3 °C) (Oral)   Resp 16   Ht 5' 9\" (1.753 m)   Wt 141 lb 8 oz (64.2 kg)   SpO2 98%   BMI 20.90 kg/m²     LABS:  WBC:    Lab Results   Component Value Date/Time    WBC 7.1 11/21/2022 05:04 AM     H/H:    Lab Results   Component Value Date/Time    HGB 11.8 11/21/2022 05:04 AM    HCT 34.7 11/21/2022 05:04 AM     PTT:  No results found for: APTT, PTT[APTT}  PT/INR:    Lab Results   Component Value Date/Time    PROTIME 14.9 11/21/2022 05:05 AM    INR 1.19 11/21/2022 05:05 AM     HgBA1c:  No results found for: LABA1C    Assessment   Robby Risk Score: Robby Scale Score: 16    Patient Active Problem List   Diagnosis Code    Unable to ambulate R26.2    Elbow effusion, left M25.422    Supratherapeutic INR R79.1    Atrial fibrillation (HCC) I48.91    S/P TAVR (transcatheter aortic valve replacement) Z95.2    Thrombocytopenia ([facility] [address] D69.6    Essential hypertension I10    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    Hairy cell leukemia, in remission ([facility] [address] C91.41    Closed head injury S09.90XA    Elbow injury, right, initial encounter S59.901A    Skin tear of right elbow without complication V90.814T    Anticoagulated Z79.01    Chronic anemia D64.9    Pulmonary infiltrates R91.8    Venous stasis ulcer of right calf with fat layer exposed without varicose veins ([facility]) I87.2, L97.212    Acute respiratory failure with hypoxia ([facility]) J96.01    Pacemaker-MEDTRONIC  Z95.0    Pleural effusion on right H93    Metabolic encephalopathy S01.44    SSS (sick sinus syndrome) ([facility]) I49.5    Bradycardia R00.1    Atelectasis J98.11    Elevated brain natriuretic peptide (BNP) level R79.89       Measurements:  Wound 11/02/22 #1, Right Pretib Cluster, Venous, Full Thickness, Onset 10/2022 (Active)   Wound Image   11/02/22 1442   Wound Etiology Venous 11/22/22 0815   Dressing Status Clean;Dry; Intact 11/22/22 0815   Wound Cleansed Other (Comment) 11/22/22 0815   Dressing/Treatment Other (comment) 11/22/22 0815   Wound Length (cm) 2.5 cm 11/16/22 1346   Wound Width (cm) 2 cm 11/16/22 1346   Wound Depth (cm) 0.2 cm 11/16/22 1346   Wound Surface Area (cm^2) 5 cm^2 11/16/22 1346   Change in Wound Size % (l*w) 60.6 11/16/22 1346   Wound Volume (cm^3) 1 cm^3 11/16/22 1346   Wound Healing % 61 11/16/22 1346   Post-Procedure Length (cm) 2.5 cm 11/09/22 1520   Post-Procedure Width (cm) 4.5 cm 11/09/22 1520   Post-Procedure Depth (cm) 0.2 cm 11/09/22 1520   Post-Procedure Surface Area (cm^2) 11.25 cm^2 11/09/22 1520   Post-Procedure Volume (cm^3) 2.25 cm^3 11/09/22 1520   Distance Tunneling (cm) 0 cm 11/09/22 1520   Undermining Maxium Distance (cm) 0 11/09/22 1520   Wound Assessment Pink/red 11/16/22 1346   Drainage Amount Small 11/16/22 1346   Drainage Description Serosanguinous 11/16/22 1346   Odor None 11/16/22 1346   Lesly-wound Assessment Hyperpigmented 11/16/22 1346   Number of days: 20 Response to treatment:  Well tolerated by patient. Pain Assessment:  Severity:  0 / 10  Quality of pain: N/A  Wound Pain Timing/Severity: none  Premedicated: N/A    Plan @ layer compression wrap to RLE weekly per Wound care RN while inpatient. If pt is discharged, he is to follow up in the [address] [place] [address],[address] for dressing changes. Legs cleansed with foam cleanser, moisturizer applied to dry skin  Wound cleansed with Vashe and patted dry. Triad and PSO to wound and periwound skin. Foam to anterior ankles and lateral right foot redness. 2 layer wrap applied, medigrip used to hold in place. Size F medigrip compression stocking placed to LLE. Plan of Care: Wound 11/02/22 #1, Right Pretib Cluster, Venous, Full Thickness, Onset 10/2022-Dressing/Treatment: Other (comment) (changed today via wound care RN)    Specialty Bed Required : N/A   [] Low Air Loss   [] Pressure Redistribution  [] Fluid Immersion  [] Bariatric  [] Total Pressure Relief  [] Other:     Current Diet: ADULT DIET;  Dysphagia - Minced and Moist  Dietician consult:  Yes    Discharge Plan:  Placement for patient upon discharge: SNF  Patient appropriate for Outpatient [address]: Yes    Referrals:  [x]   [] [address] [facility]  [] Supplies  [] Other    Patient/Caregiver Teaching:  Level of patient/caregiver understanding able to:   [] Indicates understanding       [x] Needs reinforcement  [] Unsuccessful      [x] Verbal Understanding  [] Demonstrated understanding       [] No evidence of learning  [] Refused teaching         [] N/A       Electronically signed by Jose Alejandro Merritt RN, CWOCN on 11/22/2022 at 2:18 PM

## 2022-11-22 NOTE — FLOWSHEET NOTE
11/21/22 2115   Vital Signs   Temp 97.7 °F (36.5 °C)   Temp Source Oral   Heart Rate 54   Heart Rate Source Monitor   Resp 16   /61   MAP (Calculated) 82   BP Location Right upper arm   BP Method Automatic   Patient Position Left side   Level of Consciousness 0   MEWS Score 1   Oxygen Therapy   SpO2 94 %   O2 Device Nasal cannula   O2 Flow Rate (L/min) 1.5 L/min   Pt awake and alert in bed. Vitals obtained and stable. Oriented times 4. Pt appears lethargic and states he is very tired. Shift assessment completed, see flow sheet. Pt denies any pain at this time. Scheduled meds given, see MAR. Pt denies any further needs at this time. Call light in reach.

## 2022-11-22 NOTE — FLOWSHEET NOTE
11/22/22 0345   Vital Signs   Temp 97.5 °F (36.4 °C)   Temp Source Oral   Heart Rate 59   Heart Rate Source Monitor   Resp 16   /70   MAP (Calculated) 90   BP Location Right upper arm   BP Method Automatic   Patient Position Semi fowlers   Level of Consciousness 0   MEWS Score 1   Oxygen Therapy   SpO2 91 %   O2 Device Nasal cannula   O2 Flow Rate (L/min) 1.5 L/min   Vitals obtained, see above. Bed alarm on, call light in reach. Pt resting in bed with eyes closed.  Bed alarm on Pershing Carrel, RN

## 2022-11-22 NOTE — PROGRESS NOTES
Bedside report and transfer of care given to NELSON Paz. Pt currently resting in bed with the call light within reach. Pt denies any other care needs at this time. Pt stable at this time.

## 2022-11-22 NOTE — PROGRESS NOTES
Progress Note    Admit Date:  11/18/2022    Acute respiratory failure with hypoxia  Right pleural effusion with compressive atelectasis  Acute metabolic encephalopathy  Bradycardia    Subjective:  Mr. Lizett Quintero is alert and oriented x3 today. States he feels improved today. Fatigued and weak but does state his breathing is subjectively better. Down to 2 L of O2    Objective:   Patient Vitals for the past 4 hrs:   BP Temp Temp src Pulse Resp SpO2   11/22/22 0755 (!) 142/59 97.5 °F (36.4 °C) Axillary 62 16 98 %            Intake/Output Summary (Last 24 hours) at 11/22/2022 0925  Last data filed at 11/22/2022 0516  Gross per 24 hour   Intake 300 ml   Output 1000 ml   Net -700 ml         Physical Exam:    Gen: No distress. Fatigued. Chronically ill appearing. Eyes: PERRL. No sclera icterus. No conjunctival injection. ENT: No discharge. Pharynx clear. Neck: No JVD. Trachea midline. Resp: +accessory muscle use. +Diminished breath sounds over the right lung fields. +LLL crackles. No wheezes. No rhonchi. CV: Irregular rate. Irregular rhythm. No murmur. No rub. No edema. Capillary Refill: Brisk,< 3 seconds   Peripheral Pulses: +2 palpable, equal bilaterally   GI: Non-tender. Non-distended. Normal bowel sounds. Skin: Warm and dry. No nodule on exposed extremities. No rash on exposed extremities. M/S: No cyanosis. No joint deformity. No clubbing. Neuro: Awake. Grossly nonfocal    Psych: Oriented x 3. No anxiety or agitation.           Scheduled Meds:   atorvastatin  20 mg Oral Daily    finasteride  5 mg Oral Daily    sodium chloride flush  5-40 mL IntraVENous 2 times per day    furosemide  40 mg IntraVENous BID       Continuous Infusions:   sodium chloride         PRN Meds:  perflutren lipid microspheres, sodium chloride flush, sodium chloride, polyethylene glycol, acetaminophen **OR** acetaminophen      Data:  CBC:   Recent Labs     11/20/22  0439 11/21/22  0504   WBC 6.4 7.1   HGB 11.1* 11.8*   HCT 31.7* 34.7*   MCV 94.6 95.3   PLT 46* see below       BMP:   Recent Labs     11/20/22  0439 11/21/22  0504    142   K 4.1 3.3*    95*   CO2 24 39*   BUN 26* 31*   CREATININE 1.0 0.9       LIVER PROFILE:   Recent Labs     11/20/22  0439 11/21/22  0504   AST 24 17   ALT 15 14   BILITOT 0.6 0.7   ALKPHOS 94 88       PT/INR:   Recent Labs     11/19/22  1253 11/20/22  0439 11/21/22  0505   PROTIME 38.7* 19.2* 14.9*   INR 3.95* 1.63* 1.19*         CULTURES  Covid and flu not detected  Blood cx x2 in process  Ucx in process     RADIOLOGY  CTA CHEST ABDOMEN PELVIS W CONTRAST   Final Result   No evidence of aortic dissection or aneurysm. There is diffuse calcific atherosclerotic disease particularly, aorto iliac   atherosclerotic disease. 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 There is a large low-attenuation right pleural effusion causing near complete   compressive atelectasis of the right lung. Thoracentesis may be helpful. Airspace disease with volume loss in the lingula and left lower lobe may   represent atelectasis and or pneumonia. Status post TAVR. Mild cardiomegaly. XR CHEST PORTABLE   Final Result   Moderate size right pleural effusion. Shallow lung volumes. ECHO 2/8/22  Summary: Ao Valve prosthesis mfg is Kohli. Ao Valve prosthesis measures 29 mm. There is moderate concentric left ventricular hypertrophy. There is marked mitral annular calcification present. A bioprosthetic prosthesis is present in the aortic valve position. There is mild mitral regurgitation. There is a pacemaker lead in the right ventricle. Overall left ventricular ejection fraction is estimated to be 55-60%. The left ventricular wall motion is normal.   Right ventricular systolic pressure is mildly elevated at 35-45 mmHg. Mild - moderate tricuspid regurgitation is present. The gradient is normal for this prosthetic aortic valve.    Mild perivalvular aortic regurgitation. The left atrium is mildly dilated. The right atrium is mildly dilated. Assessment/Plan:  #Acute respiratory failure hypoxia  -no home O2   -requiring 3-5 L NC; seen on 1.5 today  -wean as tolerated    #Right pleural effusion with compressive atelectasis  -likely 2/2 above  -patient is on coumadin therapy for below; holding  -INR supratherapeutic, will need reversal prior to procedure  --INR 3.75 -> 4.47- s/p 1 dose Vit K--> 1.63  -Pulmonology consulted  Patient is not  keen on getting a thoracentesis     #Acute metabolic encephalopathy   -likely secondary to hypoxia  -mentation much better today; conversational, A&Ox3     #Elevated BNP  -no hx of CHF; clinically fluid overloaded   -lasix 40 BID IV --> oral lasix 40 mg daily   -cardiology consulted  ECHO- EF 40- 45%   On ACEI  No beta blockers 2 to zina    #Bradycardia  -Improved with dose of atropine in the ED  -cardiology consulted    #Sick sinus syndrome, status post permanent pacemaker  -pacemaker interrogated and deemed to be working properly    #Aortic stenosis status post TAVR  #Permanent atrial fibrillation   -on Coumadin therapy; holding with supratherapeutic INR    #Venous insufficiency   -f/w wound care clinic  -wound care consult for RLE wounds    #TCP - chronic  #Hx of hairy cell leukemia; in remission    #BPH  -continue proscar    DVT Prophylaxis: Holding coumadin with supratherapeutic INR  Diet: ADULT DIET; Dysphagia - Minced and Moist  Code Status: DNR-CCA    Ot/pt    Needs SNF       MISHA Salcedo M.D.

## 2022-11-23 VITALS
TEMPERATURE: 97.7 F | HEIGHT: 69 IN | RESPIRATION RATE: 12 BRPM | HEART RATE: 45 BPM | BODY MASS INDEX: 20.96 KG/M2 | OXYGEN SATURATION: 96 % | SYSTOLIC BLOOD PRESSURE: 120 MMHG | DIASTOLIC BLOOD PRESSURE: 67 MMHG | WEIGHT: 141.5 LBS

## 2022-11-23 LAB
CULTURE, BLOOD 2: NORMAL
INR BLD: 1.14 (ref 0.87–1.14)
PROTHROMBIN TIME: 14.5 SEC (ref 11.7–14.5)

## 2022-11-23 PROCEDURE — 94761 N-INVAS EAR/PLS OXIMETRY MLT: CPT

## 2022-11-23 PROCEDURE — 6370000000 HC RX 637 (ALT 250 FOR IP): Performed by: INTERNAL MEDICINE

## 2022-11-23 PROCEDURE — 36415 COLL VENOUS BLD VENIPUNCTURE: CPT

## 2022-11-23 PROCEDURE — 2580000003 HC RX 258: Performed by: INTERNAL MEDICINE

## 2022-11-23 PROCEDURE — 99239 HOSP IP/OBS DSCHRG MGMT >30: CPT | Performed by: INTERNAL MEDICINE

## 2022-11-23 PROCEDURE — 2700000000 HC OXYGEN THERAPY PER DAY

## 2022-11-23 PROCEDURE — 85610 PROTHROMBIN TIME: CPT

## 2022-11-23 RX ORDER — WARFARIN SODIUM 5 MG/1
5 TABLET ORAL
Status: DISCONTINUED | OUTPATIENT
Start: 2022-11-23 | End: 2022-11-23 | Stop reason: HOSPADM

## 2022-11-23 RX ORDER — FUROSEMIDE 40 MG/1
40 TABLET ORAL DAILY
Qty: 30 TABLET | Refills: 0 | Status: SHIPPED | OUTPATIENT
Start: 2022-11-23

## 2022-11-23 RX ADMIN — FUROSEMIDE 40 MG: 40 TABLET ORAL at 10:14

## 2022-11-23 RX ADMIN — Medication 10 ML: at 10:22

## 2022-11-23 RX ADMIN — FINASTERIDE 5 MG: 5 TABLET, FILM COATED ORAL at 10:14

## 2022-11-23 NOTE — PLAN OF CARE
Problem: Discharge Planning  Goal: Discharge to home or other facility with appropriate resources  11/23/2022 1224 by Keri Rascon RN  Outcome: Adequate for Discharge  11/23/2022 1223 by Keri Rascon RN  Outcome: Progressing     Problem: Pain  Goal: Verbalizes/displays adequate comfort level or baseline comfort level  11/23/2022 1224 by Keri Rascon RN  Outcome: Adequate for Discharge  11/23/2022 1223 by Keri Rascon RN  Outcome: Progressing     Problem: Skin/Tissue Integrity  Goal: Absence of new skin breakdown  Description: 1. Monitor for areas of redness and/or skin breakdown  2. Assess vascular access sites hourly  3. Every 4-6 hours minimum:  Change oxygen saturation probe site  4. Every 4-6 hours:  If on nasal continuous positive airway pressure, respiratory therapy assess nares and determine need for appliance change or resting period.   11/23/2022 1224 by Keri Rascon RN  Outcome: Adequate for Discharge  11/23/2022 1223 by Keri Rascon RN  Outcome: Progressing     Problem: ABCDS Injury Assessment  Goal: Absence of physical injury  11/23/2022 1224 by Keri Rascon RN  Outcome: Adequate for Discharge  11/23/2022 1223 by Keri Rascon RN  Outcome: Progressing     Problem: Safety - Adult  Goal: Free from fall injury  11/23/2022 1224 by Keri Rascon RN  Outcome: Adequate for Discharge  11/23/2022 1223 by Keri Rascon RN  Outcome: Progressing

## 2022-11-23 NOTE — PROGRESS NOTES
Bedside report and transfer of care given to [facility] . Pt currently resting in bed with the call light within reach. Pt denies any other care needs at this time. Pt stable at this time.

## 2022-11-23 NOTE — FLOWSHEET NOTE
11/23/22 1000   Vital Signs   Temp 97.7 °F (36.5 °C)   Temp Source Axillary   Heart Rate (!) 45   Heart Rate Source Monitor   Resp 12   /67   MAP (Calculated) 85   BP Location Right upper arm   BP Method Automatic   Patient Position Supine   Level of Consciousness 0   MEWS Score 1   Pain Assessment   Pain Assessment None - Denies Pain   Oxygen Therapy   SpO2 96 %   O2 Device Nasal cannula   Pt sleeping upon entry into room,awake when told him wife in ER,helped him call his wife to speak with her ,shift assessment complete

## 2022-11-23 NOTE — DISCHARGE INSTR - COC
Continuity of Care Form    Patient Name: Derek Johnson   :  1931  MRN:  [id number]    Admit date:  2022  Discharge date:  22    Code Status Order: DNR-CCA   Advance Directives:     Admitting Physician:  Kelsea Garrett MD  PCP: Jomar Majano MD    Discharging Nurse: Kathy Higuera Unit/Room#: /1722-58  Discharging Unit Phone Number: [phone]    Emergency Contact:   Extended Emergency Contact Information  Primary Emergency Contact: [facility]  Address: NOR[place] Hernandez [address] [place] [address]  Mobile Phone: [phone]  Relation: Spouse    Past Surgical History:  Past Surgical History:   Procedure Laterality Date    ANOMALOUS PULMONARY VENOUS RETURN REPAIR, TOTAL      INTRACAPSULAR CATARACT EXTRACTION Left 2019    PHACO EMULSIFICATION OF CATARACT WITH INTRAOCULAR LENS IMPLANT EYE performed by Radha William MD at [place] [address] Right 2019    PHACO EMULSIFICATION OF CATARACT WITH INTRAOCULAR LENS IMPLANT EYE performed by Radha William MD at [address] [place] [address] [address]      2017    SHOULDER SURGERY      SPLENECTOMY      TONSILLECTOMY         Immunization History:   Immunization History   Administered Date(s) Administered    Tdap (Boostrix, Adacel) 2022       Active Problems:  Patient Active Problem List   Diagnosis Code    Unable to ambulate R26.2    Elbow effusion, left M25.422    Supratherapeutic INR R79.1    Atrial fibrillation (HCC) I48.91    S/P TAVR (transcatheter aortic valve replacement) Z95.2    Thrombocytopenia (HCC) D69.6    Essential hypertension I10    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    Hairy cell leukemia, in remission ([facility] C91.41    Closed head injury S09.90XA    Elbow injury, right, initial encounter S59.901A    Skin tear of right elbow without complication L80.812X    Anticoagulated Z79.01    Chronic anemia D64.9    Pulmonary infiltrates R91.8    Venous stasis ulcer of right calf with fat layer exposed without varicose veins ([facility]) I87.2, L97.212    Acute respiratory failure with hypoxia ([facility]) J96.01    Pacemaker-MEDTRONIC  Z95.0    Pleural effusion on right K00    Metabolic encephalopathy M53.98    SSS (sick sinus syndrome) ([facility]) I49.5    Bradycardia R00.1    Atelectasis J98.11    Elevated brain natriuretic peptide (BNP) level R79.89       Isolation/Infection:   Isolation            No Isolation          Patient Infection Status       Infection Onset Added Last Indicated Last Indicated By Review Planned Expiration Resolved Resolved By    None active    Resolved    COVID-19 (Rule Out) 22 COVID-19 & Influenza Combo (Ordered)   22 Rule-Out Test Resulted    COVID-19 22 COVID-19, Rapid   22             Nurse Assessment:  Last Vital Signs: /66   Pulse 66   Temp 97.7 °F (36.5 °C) (Oral)   Resp 16   Ht 5' 9\" (1.753 m)   Wt 141 lb 8 oz (64.2 kg)   SpO2 92%   BMI 20.90 kg/m²     Last documented pain score (0-10 scale): Pain Level: 5  Last Weight:   Wt Readings from Last 1 Encounters:   22 141 lb 8 oz (64.2 kg)     Mental Status:  oriented    IV Access:  - None    Nursing Mobility/ADLs:  Walking   Assisted  Transfer  Assisted  Bathing  Assisted  Dressing  Assisted  Toileting  Assisted  Feeding  Assisted  Med Admin  Assisted  Med Delivery   none    Wound Care Documentation and Therapy:  Wound 22 #1, Right Pretib Cluster, Venous, Full Thickness, Onset 10/2022 (Active)   Wound Image   22 1442   Wound Etiology Venous 22   Dressing Status Clean;Dry; Intact 22   Wound Cleansed Other (Comment) 22 040   Dressing/Treatment Other (comment) 22 040   Wound Length (cm) 2.5 cm 22 1346   Wound Width (cm) 2 cm 22 1346   Wound Depth (cm) 0.2 cm 22 1346   Wound Surface Area (cm^2) 5 cm^2 22 1346   Change in Wound Size % (l*w) 60.6 22 1346   Wound Volume (cm^3) 1 cm^3 11/16/22 1346   Wound Healing % 61 11/16/22 1346   Post-Procedure Length (cm) 2.5 cm 11/09/22 1520   Post-Procedure Width (cm) 4.5 cm 11/09/22 1520   Post-Procedure Depth (cm) 0.2 cm 11/09/22 1520   Post-Procedure Surface Area (cm^2) 11.25 cm^2 11/09/22 1520   Post-Procedure Volume (cm^3) 2.25 cm^3 11/09/22 1520   Distance Tunneling (cm) 0 cm 11/09/22 1520   Undermining Maxium Distance (cm) 0 11/09/22 1520   Wound Assessment Pink/red 11/16/22 1346   Drainage Amount Small 11/16/22 1346   Drainage Description Serosanguinous 11/16/22 1346   Odor None 11/16/22 1346   Lesly-wound Assessment Hyperpigmented 11/16/22 1346   Number of days: 20        Elimination:  Continence: Bowel: No  Bladder:external catheter  Urinary Catheter: None   Colostomy/Ileostomy/Ileal Conduit: No       Date of Last BM:     Intake/Output Summary (Last 24 hours) at 11/23/2022 0853  Last data filed at 11/23/2022 0426  Gross per 24 hour   Intake 180 ml   Output 450 ml   Net -270 ml     I/O last 3 completed shifts: In: 180 [P.O.:180]  Out: 1450 [Urine:1450]    Safety Concerns:     None    Impairments/Disabilities:      None    Nutrition Therapy:  Current Nutrition Therapy:   - Oral Diet:  General    Routes of Feeding: Oral  Liquids: No Restrictions  Daily Fluid Restriction: no  Last Modified Barium Swallow with Video (Video Swallowing Test): 11/21    Treatments at the Time of Hospital Discharge:   Respiratory Treatments:   Oxygen Therapy:  is on oxygen at 3 L/min per nasal cannula.   Ventilator:    - No ventilator support    Rehab Therapies: Physical Therapy and Occupational Therapy  Weight Bearing Status/Restrictions: No weight bearing restrictions  Other Medical Equipment (for information only, NOT a DME order):  walker  Other Treatments:     Patient's personal belongings (please select all that are sent with patient):  None    RN SIGNATURE:  Electronically signed by Nivia Mayer RN on 11/23/22 at 12:31 PM EST    CASE MANAGEMENT/SOCIAL WORK SECTION    Inpatient Status Date: 11/23/22    Readmission Risk Assessment Score:  Readmission Risk              Risk of Unplanned Readmission:  16           Discharging to Facility/ Agency   Name:   Address:  Phone:  Fax:    Dialysis Facility (if applicable)   Name:  Address:  Dialysis Schedule:  Phone:  Fax:    / signature: {Esignature:[id number]}    PHYSICIAN SECTION    Prognosis: Fair    Condition at Discharge: Stable    Rehab Potential (if transferring to Rehab): Fair    Recommended Labs or Other Treatments After Discharge: none    Physician Certification: I certify the above information and transfer of Hardeep Kohli  is necessary for the continuing treatment of the diagnosis listed and that he requires [facility] for less 30 days.      Update Admission H&P: No change in H&P    PHYSICIAN SIGNATURE:  Electronically signed by Wing Timmy MD on 11/23/22 at 8:53 AM EST

## 2022-11-23 NOTE — PROGRESS NOTES
Progress Note    Admit Date:  11/18/2022    Acute respiratory failure with hypoxia  Right pleural effusion with compressive atelectasis  Acute metabolic encephalopathy  Bradycardia    Subjective:  Mr. Lizett Quintero is alert and oriented x3 today. States he feels improved today. Fatigued and weak but does state his breathing is subjectively better. Down to 2 L of O2    Objective:   No data found. Intake/Output Summary (Last 24 hours) at 11/23/2022 0847  Last data filed at 11/23/2022 0426  Gross per 24 hour   Intake 180 ml   Output 450 ml   Net -270 ml         Physical Exam:    Gen: No distress. Fatigued. Chronically ill appearing. Eyes: PERRL. No sclera icterus. No conjunctival injection. ENT: No discharge. Pharynx clear. Neck: No JVD. Trachea midline. Resp: +accessory muscle use. +Diminished breath sounds over the right lung fields. +LLL crackles. No wheezes. No rhonchi. CV: Irregular rate. Irregular rhythm. No murmur. No rub. No edema. Capillary Refill: Brisk,< 3 seconds   Peripheral Pulses: +2 palpable, equal bilaterally   GI: Non-tender. Non-distended. Normal bowel sounds. Skin: Warm and dry. No nodule on exposed extremities. No rash on exposed extremities. M/S: No cyanosis. No joint deformity. No clubbing. Neuro: Awake. Grossly nonfocal    Psych: Oriented x 3. No anxiety or agitation.           Scheduled Meds:   furosemide  40 mg Oral Daily    warfarin placeholder: dosing by pharmacy   Other RX Placeholder    atorvastatin  20 mg Oral Daily    finasteride  5 mg Oral Daily    sodium chloride flush  5-40 mL IntraVENous 2 times per day       Continuous Infusions:   sodium chloride         PRN Meds:  perflutren lipid microspheres, sodium chloride flush, sodium chloride, polyethylene glycol, acetaminophen **OR** acetaminophen      Data:  CBC:   Recent Labs     11/21/22  0504   WBC 7.1   HGB 11.8*   HCT 34.7*   MCV 95.3   PLT see below       BMP:   Recent Labs     11/21/22  0504      K 3.3* CL 95*   CO2 39*   BUN 31*   CREATININE 0.9       LIVER PROFILE:   Recent Labs     11/21/22  0504   AST 17   ALT 14   BILITOT 0.7   ALKPHOS 88       PT/INR:   Recent Labs     11/21/22  0505 11/23/22  0420   PROTIME 14.9* 14.5   INR 1.19* 1.14         CULTURES  Covid and flu not detected  Blood cx x2 in process  Ucx in process     RADIOLOGY  CTA CHEST ABDOMEN PELVIS W CONTRAST   Final Result   No evidence of aortic dissection or aneurysm. There is diffuse calcific atherosclerotic disease particularly, aorto iliac   atherosclerotic disease. 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 There is a large low-attenuation right pleural effusion causing near complete   compressive atelectasis of the right lung. Thoracentesis may be helpful. Airspace disease with volume loss in the lingula and left lower lobe may   represent atelectasis and or pneumonia. Status post TAVR. Mild cardiomegaly. XR CHEST PORTABLE   Final Result   Moderate size right pleural effusion. Shallow lung volumes. ECHO 2/8/22  Summary: Ao Valve prosthesis mfg is Kohli. Ao Valve prosthesis measures 29 mm. There is moderate concentric left ventricular hypertrophy. There is marked mitral annular calcification present. A bioprosthetic prosthesis is present in the aortic valve position. There is mild mitral regurgitation. There is a pacemaker lead in the right ventricle. Overall left ventricular ejection fraction is estimated to be 55-60%. The left ventricular wall motion is normal.   Right ventricular systolic pressure is mildly elevated at 35-45 mmHg. Mild - moderate tricuspid regurgitation is present. The gradient is normal for this prosthetic aortic valve. Mild perivalvular aortic regurgitation. The left atrium is mildly dilated. The right atrium is mildly dilated.      Assessment/Plan:  #Acute respiratory failure hypoxia  -no home O2   -requiring 3-5 L NC; seen on 1.5 today  -wean as tolerated    #Right pleural effusion with compressive atelectasis  -likely 2/2 above  -patient is on coumadin therapy for below; holding  -INR supratherapeutic, will need reversal prior to procedure  --INR 3.75 -> 4.47- s/p 1 dose Vit K--> 1.63  -Pulmonology consulted  Patient is not  keen on getting a thoracentesis     #Acute metabolic encephalopathy   -likely secondary to hypoxia  -mentation much better today; conversational, A&Ox3     #Elevated BNP  -no hx of CHF; clinically fluid overloaded   -lasix 40 BID IV --> oral lasix 40 mg daily   -cardiology consulted  ECHO- EF 40- 45%   On ACEI  No beta blockers 2 to zina    #Bradycardia  -Improved with dose of atropine in the ED  -cardiology consulted    #Sick sinus syndrome, status post permanent pacemaker  -pacemaker interrogated and deemed to be working properly    #Aortic stenosis status post TAVR  #Permanent atrial fibrillation   -on Coumadin therapy; holding with supratherapeutic INR    #Venous insufficiency   -f/w wound care clinic  -wound care consult for RLE wounds    #TCP - chronic  #Hx of hairy cell leukemia; in remission    #BPH  -continue proscar    DVT Prophylaxis: Holding coumadin with supratherapeutic INR  Diet: ADULT DIET; Dysphagia - Minced and Moist  Code Status: DNR-CCA    Ot/pt    Needs SNF       MISHA Salcedo M.D.

## 2022-11-23 NOTE — PROGRESS NOTES
Pharmacy Note  Warfarin Consult  Dx: Afib  Goal INR range 2-3   Home Warfarin dose:5mg M/Th and 2.5mg on all other days      Date  INR  Warfarin  11/22             1.19                    5mg  11/23              1.14                   5mg  Recommend Warfarin 5mg tonight x1. Daily INR ordered. Rx will continue to manage therapy per consult order. Sarah Mesa Pharm D 11/23/202211:03 AM  .      .

## 2022-11-23 NOTE — PROGRESS NOTES
Patient educated on discharge instructions as well as new medications use, dosage, administration and possible side effects. Patient verified knowledge. IV removed without difficulty and dry dressing in place. Telemetry monitor removed and returned to [address] [place] [address]. Pt left facility in stable condition to [facility] with all of their personal belongings.  Report called to [facility].

## 2022-11-23 NOTE — CARE COORDINATION
INTERDISCIPLINARY PLAN OF CARE CONFERENCE    Date/Time: 11/23/2022 9:05 AM  Completed by: Corrinne Cola, RN, Case Management      Patient Name:  Hardeep Kohli  YOB: 1931  Admitting Diagnosis: Bradycardia [R00.1]  Pleural effusion on right [J90]  Elevated brain natriuretic peptide (BNP) level [R79.89]  Acute respiratory failure with hypoxia ([facility] [address] [J96.01]     Admit Date/Time:  11/18/2022  6:30 PM    Chart reviewed. Interdisciplinary team contacted or reviewed plan related to patient progress and discharge plans. Disciplines included Case Management, Nursing, and Dietitian. Current Status:IP 11/16/2022    Clinicals requested by [facility] for pre-cert. Re faxed to [phone].   [id number] # [id number]

## 2022-11-28 NOTE — PROGRESS NOTES
Physician Progress Note      PATIENT:               Ronel Ramirez  CSN #:                  [id number]  :                       1931  ADMIT DATE:       2022 6:30 PM  [address] [place] DATE:        2022 1:37 PM  RESPONDING  PROVIDER #:        Lori Ford MD          QUERY TEXT:    Patient admitted with rt pleural effusion, noted to have elevated bnp, no hx   of CHF and clinically fluid overloaded. Echo EF 40-45%. The pulmonary   consult note states- Large right pleural effusion with compressive   atelectasis-suspecting due to CHF. Cardiology notes new cardiomyopathy. If   possible, please document in progress notes and d/c summary further   specificity regarding the type/underlying cause of pleural effusion: The medical record reflects the following:  Risk Factors: advanced age, SSS-pacemaker, aortic stenosis, afib,   cardiomyopathy  Clinical Indicators: [id number], echo: Left ventricular systolic function is   reduced with ejection fraction  estimated at 40-45%. Wall motion is hard to assess due to ventricular paced    and ectopy. Treatment: bnp, echo, pulmonology/cardiology consult, declined thoracentesis,   IV lasix, ACEI, no beta blockers 2 to zina    Thank you for your assistance,  Casey Gale RN,BSN,CCDS,CRCR  Options provided:  -- Pleural effusion due to acute systolic CHF  -- Pleural effusion not related to CHF but due to, please specify the   underlying cause. -- Other - I will add my own diagnosis  -- Disagree - Not applicable / Not valid  -- Disagree - Clinically unable to determine / Unknown  -- Refer to Clinical Documentation Reviewer    PROVIDER RESPONSE TEXT:    Patient has pleural effusion due to acute systolic CHF.     Query created by: Ruba Sparks on 2022 9:34 AM      Electronically signed by:  Lori Ford MD 2022 1:04 PM

## 2022-11-28 NOTE — DISCHARGE SUMMARY
Name:  Jim Nieves  Room:  /8377-23  MRN:    [id number]    Discharge Summary      This discharge summary is in conjunction with a complete physical exam done on the day of discharge. Discharging Physician: Sha Vela MD      Admit: 11/18/2022  Discharge:  11/23/2022    Diagnoses this Admission    Principal Problem:    Acute respiratory failure with hypoxia (HCC)  Active Problems:    Pleural effusion on right    Metabolic encephalopathy    SSS (sick sinus syndrome) (HCC)    Bradycardia    Atelectasis    Elevated brain natriuretic peptide (BNP) level  Resolved Problems:    * No resolved hospital problems. *          Procedures (Please Review Full Report for Details)      Consults    IP CONSULT TO CARDIOLOGY  IP CONSULT TO PULMONOLOGY  IP CONSULT TO CASE MANAGEMENT  IP CONSULT TO PALLIATIVE CARE  IP CONSULT TO PALLIATIVE CARE  IP CONSULT TO PHARMACY      HPI:  80 y.o. male who presented to the hospital with a chief complaint with shortness of breath and hypoxia. The patient was brought in by squad. The patient apparently has been short of breath for the past week. His symptoms progressively got worse and today he felt he could not catch his breath. He was heavy breathing per his wife when EMS got there. He was hypoxic on room air when he was brought to the emergency department. The patient was very somnolent when I saw him and cannot provide history, the wife who was at his bedside was a very poor historian and could not give much history. He was recently treated for UTI by his PCP otherwise she says he has been fine except for this shortness of breath. In emergency department he was hypoxic on presentation and was noted to be bradycardic on the monitor into 30s. He was given a dose of atropine with improvement of his heart rate. Of note the patient has a pacemaker in place which has been interrogated and deemed to be working properly.        In emergency department work-up which included a CT of his chest abdomen pelvis was positive for significant right pleural effusion causing almost collapse of the right lung. There was also concern for pneumonitis. He will be admitted for further medical evaluation and treatment        Hospital Course    #Acute respiratory failure hypoxia  -no home O2   -requiring 3-5 L NC; seen on 1.5 today  -wean as tolerated     #Right pleural effusion with compressive atelectasis  -likely 2/2 above  -patient is on coumadin therapy for below; holding  -INR supratherapeutic, will need reversal prior to procedure  --INR 3.75 -> 4.47- s/p 1 dose Vit K--> 1.63  -Pulmonology consulted  Patient idid not want to get a thoracentesis      #Acute metabolic encephalopathy   -likely secondary to hypoxia  -mentation much better today; conversational, A&Ox3      #Elevated BNP  -no hx of CHF; clinically fluid overloaded   -lasix 40 BID IV --> oral lasix 40 mg daily   -cardiology consulted  ECHO- EF 40- 45%   On ACEI  No beta blockers 2 to zina     #Bradycardia  -Improved with dose of atropine in the ED  -cardiology consulted     #Sick sinus syndrome, status post permanent pacemaker  -pacemaker interrogated and deemed to be working properly     #Aortic stenosis status post TAVR  #Permanent atrial fibrillation   -on Coumadin therapy; holding with supratherapeutic INR     #Venous insufficiency   -f/w wound care clinic  -wound care consult for RLE wounds     #TCP - chronic  #Hx of hairy cell leukemia; in remission     #BPH  -continue proscar           CTA CHEST ABDOMEN PELVIS W CONTRAST   Final Result   No evidence of aortic dissection or aneurysm. There is diffuse calcific atherosclerotic disease particularly, aorto iliac   atherosclerotic disease. 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 There is a large low-attenuation right pleural effusion causing near complete   compressive atelectasis of the right lung. Thoracentesis may be helpful. Airspace disease with volume loss in the lingula and left lower lobe may   represent atelectasis and or pneumonia. Status post TAVR. Mild cardiomegaly. XR CHEST PORTABLE   Final Result   Moderate size right pleural effusion. Shallow lung volumes. Discharge Medications     Medication List        START taking these medications      furosemide 40 MG tablet  Commonly known as: LASIX  Take 1 tablet by mouth daily            CONTINUE taking these medications      atorvastatin 10 MG tablet  Commonly known as: LIPITOR     finasteride 5 MG tablet  Commonly known as: PROSCAR     Metamucil 28 % packet  Generic drug: psyllium     therapeutic multivitamin-minerals tablet     warfarin 5 MG tablet  Commonly known as: COUMADIN            STOP taking these medications      lisinopril 40 MG tablet  Commonly known as: PRINIVIL;ZESTRIL               Where to Get Your Medications        These medications were sent to [facility] [id number] [place] [address] [address] - P [phone] F [phone]  [address] , [place]      Phone: [phone]   furosemide 40 MG tablet           Discharge Condition/Location: Stable to NH    Follow Up: Follow up with PCP.     Total time spent on discharge is 35 minutes      Sean Pool MD 11/28/2022 1:46 PM

## 2022-11-29 PROBLEM — R60.0 LOWER EXTREMITY EDEMA: Status: ACTIVE | Noted: 2022-11-29

## 2022-11-29 NOTE — PROGRESS NOTES
[facility] Progress Note    Tessa Herring [facility]     : 1931    DATE OF VISIT:  2022    Subjective:     Hermann Palomares is a 80 y.o. male who has a venous ulcer located on the right, anterior lower leg. Significant symptoms or pertinent wound history since last visit: feeling ok overall, tolerated being wrapped for the week, minor pain, less RLE swelling, no fever, not a lot of drainage, mild pruritus. Went back to wearing Spandagrip for a bit of mild compression on the left side. Additional ulcer(s) noted? no.      His current medication list consists of atorvastatin, finasteride, furosemide, psyllium, therapeutic multivitamin-minerals, and warfarin. Allergies: Patient has no known allergies. Objective:     Vitals:    22 1436   BP: (!) 167/99   Pulse: 66   Resp: 20   Temp: 97.5 °F (36.4 °C)   TempSrc: Oral   Height: 5' 9\" (1.753 m)     Constitutional:  well-developed, well-nourished, weak and fatigued, NAD  Psychiatric:  oriented to person, place and time; mood and affect appropriate for the situation   Cardiovascular:  bilateral pedal pulses palpable (ankles Dopplerable), toes warm, borderline cap refill; milder right and more moderate left lower extremity edema; some hemosiderin changes, chronic stasis dermatitis, prominent reticular veins  Lymphatic:  no inguinal or popliteal adenopathy, no angitis or cellulitis  Musculoskeletal:  no clubbing, cyanosis or petechiae; RLE and LLE with no gross effusions, joint misalignment or acute arthritis  Lesly-ulcer skin: indurated, ecchymotic, and stasis dermatitis. much improved tinea from last week  Ulcer(s):  primary oval ulcer, part red, a bit less fat necrosis, fibrin, biofilm, no pus, a couple of adjacent superficial erosions too, no signs of infection, a bit of new granulation this week. Photos also saved in electronic chart.     Today's wound measurements, per RN documentation:  Wound 22 #1, Right Pretib Cluster, Venous, Full Thickness, Onset 10/2022-Wound Length (cm): 2.5 cm    Wound 11/02/22 #1, Right Pretib Cluster, Venous, Full Thickness, Onset 10/2022-Wound Width (cm): 4.5 cm    Wound 11/02/22 #1, Right Pretib Cluster, Venous, Full Thickness, Onset 10/2022-Wound Depth (cm): 0.2 cm    Assessment:     Patient Active Problem List   Diagnosis Code    Unable to ambulate R26.2    Supratherapeutic INR R79.1    Atrial fibrillation ([facility]) I48.91    S/P TAVR (transcatheter aortic valve replacement) Z95.2    Thrombocytopenia ([facility]) D69.6    Essential hypertension I10    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    Hairy cell leukemia, in remission ([facility]) C91.41    Chronic anemia D64.9    Pulmonary infiltrates R91.8    Venous stasis ulcer of right calf with fat layer exposed without varicose veins ([facility]) I87.2, L97.212    Pacemaker-[employer]  Z95.0    Pleural effusion on right J90    SSS (sick sinus syndrome) ([facility]) I49.5    Bradycardia R00.1    Atelectasis J98.11    Elevated brain natriuretic peptide (BNP) level R79.89    Lower extremity edema R60.0       Assessment of today's active condition(s): venous stasis, skin changes, LE edema, recent traumatic RLE wound, evolved to a nonhealing ulcer, looking a bit  and healthier this week; edema less, no signs of infection or ischemia, nearby tinea better. Factors contributing to occurrence and/or persistence of the chronic ulcer include edema, venous stasis, decreased mobility, and anticoagulation therapy. Medical necessity of today's visit is shown by the above documentation. Sharp debridement is indicated today, based upon the exam findings in the wound(s) above. Procedure note:     Consent obtained. Time out performed per [facility] policy. Anesthetic  Anesthetic: 4% Lidocaine Cream     Using a curette, I sharply debrided the right lower leg ulcer(s) down through and including the removal of subcutaneous tissue.  The type(s) of tissue debrided included fibrin, biofilm, and necrotic/eschar. Total Surface Area Debrided: 5 sq cm. The ulcers were then irrigated with normal saline solution. The procedure was completed with a small amount of bleeding, and hemostasis was with pressure. The patient tolerated the procedure well, with no significant complications. The patient's level of pain during and after the procedure was monitored. Post-debridement measurements, if different from pre-debridement, are in the flowsheet as well. Discharge plan:     Treatment in the wound care center today, per RN documentation: Wound 11/02/22 #1, Right Pretib Cluster, Venous, Full Thickness, Onset 10/2022-Dressing/Treatment: Other (comment) (Triad and PSO mix, 4x4's, Foam to anterior ankle, Accuwrap Lite,Nylon). Per physician order, right application of multilayer compression wrap was performed in the wound care center today, to help manage edema, stasis dermatitis, and/or venous ulcers. Leave primary dressing and multi-layer wrap(s) in place until the next appointment. Also discussed ways to keep the wrap dry for a shower, including a plastic cast-guard, available in retail pharmacies. He should call before his next visit if there is any significant pain, significant strike-through of drainage to the surface of the wrap, or any significant sense of the wrap sliding down more than an inch or so, bunching-up and abrading his skin. I reminded the patient of the importance of weight management and smoking cessation, if applicable; also encouraged ambulation as tolerated, additional lower extremity exercises as instructed in our education sheet, leg elevation when at rest, and compliance with any recommended dietary, diuretic and compression therapies. Written discharge instructions given to patient. Follow up in 1 week.     Electronically signed by Kush Meehan MD on 11/29/2022 at 3:07 PM.

## 2022-11-29 NOTE — H&P
[facility] [place] [facility] H&P Note    Shantel Wood [facility]     : 1931    DATE OF VISIT:  2022    Subjective:     Luh Bagley is a 80 y.o. male who has a venous and  traumatic ulcer located on the right, anterior lower leg. Current complaint of pain in this ulcer? yes. Quality of pain: aching and sharp  Timing: intermittent and stable  Severity: mild  Associated Signs/Symptoms:  swelling, redness, and drainage (moderate, clear)  Other significant symptoms or pertinent ulcer history:  [facility] has some signs of chronic venous disease, a bit of leg edema, very fragile skin, and is chronically anticoagulated. Recently sustained a minor traumatic wound to the right shin, with a bit of bleeding and skin necrosis, persisted as a nonhealing wound. Having dressing changes at home TIW. No recent fever, chills, wound pus or malodor. Additional ulcer(s) noted? no.       [facility] has a past medical history of Aortic valve stenosis, Arthritis, Atrial fibrillation ([facility] [address] Hairy cell leukemia, in remission ([facility] [address] Hernia, abdominal, Hx of blood clots, Hypertension, Phlebitis, Pneumonia, and Vertigo. He has a past surgical history that includes Splenectomy; shoulder surgery; pacemaker placement; Intracapsular cataract extraction (Left, 2019); Intracapsular cataract extraction (Right, 2019); Anomalous pulmonary venous return repair, total; and Tonsillectomy. His family history includes Arthritis in his mother; Cancer in his paternal grandmother; Rheumatologic Disease in his mother.  [facility] reports that he has never smoked. He has never used smokeless tobacco. He reports that he does not drink alcohol and does not use drugs. His current medication list consists of atorvastatin, finasteride, furosemide, psyllium, therapeutic multivitamin-minerals, and warfarin. Allergies: Patient has no known allergies.     Pertinent items from the review of systems are discussed in the HPI; the remainder of the ROS was reviewed and is negative. Objective:     Vitals:    11/02/22 1436 11/02/22 1632   BP: (!) 134/93    Pulse: 86    Resp: (!) 118    Temp: 97.8 °F (36.6 °C)    TempSrc: Oral    Weight: Comment: will attempt weight on D/C - pt in frances chair 162 lb (73.5 kg)   Height: 5' 9\" (1.753 m)      UMM 1.46 but incomplete (arm systolics 864, right ankle 110 & 190, left incompressible), and I'm sure that 190 is falsely elevated. Constitutional:  well-developed, well-nourished, weak and fatigued, NAD  Psychiatric:  oriented to person, place and time; mood and affect appropriate for the situation   Eyes:  pupils equal, round and reactive to light; sclerae anicteric, conjunctivae not pale  Cardiovascular:  bilateral pedal pulses palpable (ankles Dopplerable), toes warm, borderline cap refill; mild-moderate BL lower extremity edema; some hemosiderin changes, chronic stasis dermatitis, prominent reticular veins  Lymphatic:  no inguinal or popliteal adenopathy, no angitis or cellulitis  Musculoskeletal:  no clubbing, cyanosis or petechiae; RLE and LLE with no gross effusions, joint misalignment or acute arthritis  Lesly-ulcer skin: indurated, ecchymotic, and stasis dermatitis. Also a few sizeable patches of what look to be tinea, more distal and toward the foot and ankle. Ulcer(s):  primary oval ulcer, part red, a bit of fat necrosis, fibrin, biofilm, no pus, a couple of adjacent superficial erosions too, no signs of infection . Photos also saved in electronic chart.     Today's Wound Measurements, per RN documentation:  Wound 11/02/22 #1, Right Pretib Cluster, Venous, Full Thickness, Onset 10/2022-Wound Length (cm): 2.7 cm    Wound 11/02/22 #1, Right Pretib Cluster, Venous, Full Thickness, Onset 10/2022-Wound Width (cm): 4.7 cm    Wound 11/02/22 #1, Right Pretib Cluster, Venous, Full Thickness, Onset 10/2022-Wound Depth (cm): 0.2 cm  _____________________________    Lab Results   Component Value Date    LABALBU 3.1 (L) 11/21/2022     Lab Results   Component Value Date    CREATININE 0.9 11/21/2022     Lab Results   Component Value Date    HGB 11.8 (L) 11/21/2022     Assessment:     Patient Active Problem List   Diagnosis Code    Unable to ambulate R26.2    Elbow effusion, left M25.422    Supratherapeutic INR R79.1    Atrial fibrillation ([facility]) I48.91    S/P TAVR (transcatheter aortic valve replacement) Z95.2    Thrombocytopenia ([facility]) D69.6    Essential hypertension I10    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    Hairy cell leukemia, in remission ([facility] [address] C91.41    Anticoagulated Z79.01    Chronic anemia D64.9    Pulmonary infiltrates R91.8    Venous stasis ulcer of right calf with fat layer exposed without varicose veins ([facility]) I87.2, L97.212    Pacemaker-[employer]  Z95.0    Pleural effusion on right J90    SSS (sick sinus syndrome) ([facility]) I49.5    Bradycardia R00.1    Atelectasis J98.11    Elevated brain natriuretic peptide (BNP) level R79.89    Lower extremity edema R60.0       Assessment of today's active condition(s): underlying venous disease, leg edema, chronic anticoagulation, minor traumatic wound, evolved to a nonhealing ulcer, no signs of ischemia or infection, and I think can make some progress with debridement, standard dressings and weekly compression, at least for a start. Factors contributing to occurrence and/or persistence of the chronic ulcer include edema, venous stasis, decreased mobility, and anticoagulation therapy. Medical necessity of today's visit is shown by the above documentation. Sharp debridement is indicated today, based upon the exam findings in the ulcer(s) above. Procedure note:     Consent obtained. Time out performed per [facility] policy. Anesthetic  Anesthetic: 4% Lidocaine Cream     Using a curette, I sharply debrided the right lower leg ulcer(s) down through and including the removal of subcutaneous tissue.  The type(s) of tissue debrided included fibrin, biofilm, and necrotic/eschar. Total Surface Area Debrided: 6 sq cm. The ulcers were then irrigated with normal saline solution. The procedure was completed with a small amount of bleeding, and hemostasis was with pressure. The patient tolerated the procedure well, with no significant complications. The patient's level of pain during and after the procedure was monitored. Post-debridement measurements, if different from pre-debridement, are in the flowsheet as well. Discharge plan:     Treatment in the wound care center today, per RN documentation: Wound 11/02/22 #1, Right Pretib Cluster, Venous, Full Thickness, Onset 10/2022-Dressing/Treatment: Other (comment) (Vashe,Triad/PSO,4x4's,Aquaphor to hyperkeratotic skin  Lotrisone to fungal rash, Compri 2 lite compression wrap). Per physician order, right application of multilayer compression wrap was performed in the wound care center today, to help manage edema, stasis dermatitis, and/or venous ulcers. Leave primary dressing and multi-layer wrap(s) in place until the next appointment. Also discussed ways to keep the wrap dry for a shower, including a plastic cast-guard, available in retail pharmacies. He should call before his next visit if there is any significant pain, significant strike-through of drainage to the surface of the wrap, or any significant sense of the wrap sliding down more than an inch or so, bunching-up and abrading his skin. I reminded the patient of the importance of weight management and smoking cessation, if applicable; also encouraged ambulation as tolerated, additional lower extremity exercises as instructed in our education sheet, leg elevation when at rest, and compliance with any recommended dietary, diuretic and compression therapies. Written discharge instructions given to patient. Follow up in 1 week.     Electronically signed by Zahida Mahan MD on 11/29/2022 at 11:47 AM.

## 2022-12-01 NOTE — DISCHARGE INSTRUCTIONS
[facility] Orders and Discharge [address]  [address], [address]  [place], [facility]  Telephone: [phone]      Fax: [phone]        Your home care company:   [facility] . Your wound-care supplies will be provided by: [facility] . NAME:  Guillermo Gomez   YOB: 1931  PRIMARY DIAGNOSIS FOR WOUND CARE CENTER:  Venous leg ulcer . Wound cleansing:   Do not scrub or use excessive force. Wash hands with soap and water before and after dressing changes. Prior to applying a clean dressing, cleanse wound with normal saline, wound cleanser, or mild soap and water. Ask your physician or nurse before getting the wound(s) wet in the shower. Wound care for home:     Right lower leg:  Vashe spray to wound  Triad/PSO to wound  Cover with 4x4's  Foam to anterior ankle  Compri 2 lite compression wrap  Single layer light compression medigrip to help hold dressing in place   Leave in place for the week. Keep clean, dry & intact. Your physician has ordered Compression for treatment of venous ulcers, venous insufficiency,and/or Lymphedema. * Do not remove until your next scheduled visit in [address] [place] [address]- do not get wet.     * If your wrap falls down, becomes painful or you have decreased sensation, and/or excessive drainage- please call the [address] [place] [address],[address] for RN visit to get your compression wrap changed   * You need to exercice as tolerated- walking is good   * Elevate your legs preferrably above level of your heart when sitting as much as possible   * The Goal of this therapy is to reduce Edema and get into long term compression garments to control venous insufficiency, lymphedema and reduce occurrence of venous ulcers            Please note, all wounds (unless stated otherwise here) were mechanically debrided at the time of cleansing here in the wound-care center today, so a small amount of pain, drainage or bleeding from that process might be expected, and is normal.      All products for home use, including multiple products for a single wound if applicable, are medically necessary in order to achieve the best chance at timely wound healing. See provider documentation for details if needed. Substituted dressings applied in the [facility] today, if applicable:     N/a     New orders for this week (labs, imaging, medications, etc.):     You may purchase a cast cover at the pharmacy down the [address] to use when bathing to help keep your dressing dry. Continue to wear your compression sleeve on left leg for edema control. You may continue to work with Physical & Occupational therapy. Additional instructions for specific diagnoses:     General comments for venous / lymphedema ulcers: *  Elevate your legs to the level of your heart for at least 30 minutes, several times daily. *  Walk as much as you can tolerate. Avoid standing for long periods of time, but if you must stand, regularly do heel-raises and calf-pump exercises. *  If you have compression wraps designed to remain in place all week, be sure to keep them from getting wet. *  If you have compression garments that can be changed regularly, apply them first thing in the morning, and remove them when you go to bed. Moisturize your skin at bedtime, with Vaseline, Aquaphor, Aveeno, CeraVe, Cetaphil, Eucerin, Lubriderm, etc; but keep the skin between your toes dry. *  If you smoke, your wound can not heal properly -- please talk with us when you're ready to quit. *  Be sure to adhere to any recommendations from your PCP about diuretics (water pills), diet, exercise, and maintaining a healthy weight. If you have questions, please ask. *  If you have a compression pump, aim for at least two hours of use daily.           F/U Appointment is  with Dr. Alessandro Crews in 1 week  on                                   at                       .     Your nurse  is NELSON Edge. If we applied slip-resistant hospital socks today, be sure to remove them at least once a day to inspect your toes or feet, even if you're not changing the wraps or dressings underneath. If you see anything concerning (redness, excess moisture, etc), please call and let us know right away. Should you experience any significant changes in your wound(s) (including redness, increased warmth, increased pain, increased drainage, odor, or fever) or have questions about your wound care, please contact the [address] [place] [address] at [phone] Monday-Thursday from 8:00 am - 4:30 pm, or Friday from 8:00 am - 2:30 pm.  If you need help with your wound outside these hours and cannot wait until we are again available, contact your home-care company (if applicable), your PCP, or go to the nearest emergency room.

## 2022-12-07 ENCOUNTER — HOSPITAL ENCOUNTER (OUTPATIENT)
Dept: WOUND CARE | Age: 87
Discharge: HOME | End: 2022-12-07

## 2023-04-18 NOTE — ED NOTES
Pulse palpated at R. Wrist to ensure cardiac monitor was showing correct rate. Pulse palpated at 101 at this time.      Celena Potts RN  11/18/22 2007 show

## (undated) DEVICE — SURGICAL PROCEDURE PACK EYE CLERMONT

## (undated) DEVICE — GLOVE ORANGE PI 7 1/2   MSG9075

## (undated) DEVICE — SOLUTION IV IRRIG 500ML 0.9% SODIUM CHL 2F7123

## (undated) DEVICE — CANNULA NSL 13FT TUBE AD ETCO2 DIV SAMP M

## (undated) DEVICE — Device

## (undated) DEVICE — 1010 S-DRAPE TOWEL DRAPE 10/BX: Brand: STERI-DRAPE™

## (undated) DEVICE — VISCOAT OPTH SOLN 0.5ML VISCOELASTIC

## (undated) DEVICE — MICROSURGICAL INSTRUMENT ANTERIOR CHAMBER CANNULA 30GA: Brand: ALCON

## (undated) DEVICE — SOLUTION IV IRRIG WATER 1000ML POUR BRL 2F7114